# Patient Record
Sex: FEMALE | Race: WHITE | NOT HISPANIC OR LATINO | Employment: OTHER | URBAN - METROPOLITAN AREA
[De-identification: names, ages, dates, MRNs, and addresses within clinical notes are randomized per-mention and may not be internally consistent; named-entity substitution may affect disease eponyms.]

---

## 2018-12-03 ENCOUNTER — OFFICE VISIT (OUTPATIENT)
Dept: FAMILY MEDICINE CLINIC | Facility: CLINIC | Age: 73
End: 2018-12-03
Payer: MEDICARE

## 2018-12-03 ENCOUNTER — APPOINTMENT (OUTPATIENT)
Dept: LAB | Facility: HOSPITAL | Age: 73
End: 2018-12-03
Payer: MEDICARE

## 2018-12-03 VITALS
BODY MASS INDEX: 35.57 KG/M2 | OXYGEN SATURATION: 93 % | WEIGHT: 181.2 LBS | HEART RATE: 63 BPM | HEIGHT: 60 IN | TEMPERATURE: 97.9 F | DIASTOLIC BLOOD PRESSURE: 82 MMHG | RESPIRATION RATE: 16 BRPM | SYSTOLIC BLOOD PRESSURE: 150 MMHG

## 2018-12-03 DIAGNOSIS — E78.5 DYSLIPIDEMIA: ICD-10-CM

## 2018-12-03 DIAGNOSIS — I10 ESSENTIAL HYPERTENSION: Primary | ICD-10-CM

## 2018-12-03 DIAGNOSIS — G47.00 INSOMNIA, UNSPECIFIED TYPE: ICD-10-CM

## 2018-12-03 DIAGNOSIS — I10 ESSENTIAL HYPERTENSION: ICD-10-CM

## 2018-12-03 DIAGNOSIS — J44.9 CHRONIC OBSTRUCTIVE PULMONARY DISEASE, UNSPECIFIED COPD TYPE (HCC): ICD-10-CM

## 2018-12-03 DIAGNOSIS — Z86.73 HISTORY OF CVA (CEREBROVASCULAR ACCIDENT): ICD-10-CM

## 2018-12-03 DIAGNOSIS — Z11.59 ENCOUNTER FOR HEPATITIS C SCREENING TEST FOR LOW RISK PATIENT: ICD-10-CM

## 2018-12-03 LAB
ALBUMIN SERPL BCP-MCNC: 3.6 G/DL (ref 3.5–5)
ALP SERPL-CCNC: 88 U/L (ref 46–116)
ALT SERPL W P-5'-P-CCNC: 25 U/L (ref 12–78)
ANION GAP SERPL CALCULATED.3IONS-SCNC: 3 MMOL/L (ref 4–13)
AST SERPL W P-5'-P-CCNC: 27 U/L (ref 5–45)
BASOPHILS # BLD AUTO: 0.03 THOUSANDS/ΜL (ref 0–0.1)
BASOPHILS NFR BLD AUTO: 0 % (ref 0–1)
BILIRUB SERPL-MCNC: 0.45 MG/DL (ref 0.2–1)
BUN SERPL-MCNC: 12 MG/DL (ref 5–25)
CALCIUM SERPL-MCNC: 9.7 MG/DL (ref 8.3–10.1)
CHLORIDE SERPL-SCNC: 105 MMOL/L (ref 100–108)
CHOLEST SERPL-MCNC: 147 MG/DL (ref 50–200)
CO2 SERPL-SCNC: 27 MMOL/L (ref 21–32)
CREAT SERPL-MCNC: 0.8 MG/DL (ref 0.6–1.3)
EOSINOPHIL # BLD AUTO: 0.11 THOUSAND/ΜL (ref 0–0.61)
EOSINOPHIL NFR BLD AUTO: 2 % (ref 0–6)
ERYTHROCYTE [DISTWIDTH] IN BLOOD BY AUTOMATED COUNT: 14.6 % (ref 11.6–15.1)
GFR SERPL CREATININE-BSD FRML MDRD: 73 ML/MIN/1.73SQ M
GLUCOSE P FAST SERPL-MCNC: 92 MG/DL (ref 65–99)
HCT VFR BLD AUTO: 46.1 % (ref 34.8–46.1)
HCV AB SER QL: NORMAL
HDLC SERPL-MCNC: 54 MG/DL (ref 40–60)
HGB BLD-MCNC: 14.7 G/DL (ref 11.5–15.4)
IMM GRANULOCYTES # BLD AUTO: 0.01 THOUSAND/UL (ref 0–0.2)
IMM GRANULOCYTES NFR BLD AUTO: 0 % (ref 0–2)
LDLC SERPL CALC-MCNC: 63 MG/DL (ref 0–100)
LYMPHOCYTES # BLD AUTO: 1.75 THOUSANDS/ΜL (ref 0.6–4.47)
LYMPHOCYTES NFR BLD AUTO: 24 % (ref 14–44)
MCH RBC QN AUTO: 29.8 PG (ref 26.8–34.3)
MCHC RBC AUTO-ENTMCNC: 31.9 G/DL (ref 31.4–37.4)
MCV RBC AUTO: 93 FL (ref 82–98)
MONOCYTES # BLD AUTO: 0.75 THOUSAND/ΜL (ref 0.17–1.22)
MONOCYTES NFR BLD AUTO: 10 % (ref 4–12)
NEUTROPHILS # BLD AUTO: 4.75 THOUSANDS/ΜL (ref 1.85–7.62)
NEUTS SEG NFR BLD AUTO: 64 % (ref 43–75)
NONHDLC SERPL-MCNC: 93 MG/DL
NRBC BLD AUTO-RTO: 0 /100 WBCS
PLATELET # BLD AUTO: 277 THOUSANDS/UL (ref 149–390)
PMV BLD AUTO: 10.6 FL (ref 8.9–12.7)
POTASSIUM SERPL-SCNC: 4.4 MMOL/L (ref 3.5–5.3)
PROT SERPL-MCNC: 7.6 G/DL (ref 6.4–8.2)
RBC # BLD AUTO: 4.94 MILLION/UL (ref 3.81–5.12)
SODIUM SERPL-SCNC: 135 MMOL/L (ref 136–145)
TRIGL SERPL-MCNC: 150 MG/DL
TSH SERPL DL<=0.05 MIU/L-ACNC: 0.92 UIU/ML (ref 0.36–3.74)
WBC # BLD AUTO: 7.4 THOUSAND/UL (ref 4.31–10.16)

## 2018-12-03 PROCEDURE — 85025 COMPLETE CBC W/AUTO DIFF WBC: CPT

## 2018-12-03 PROCEDURE — 80061 LIPID PANEL: CPT

## 2018-12-03 PROCEDURE — 36415 COLL VENOUS BLD VENIPUNCTURE: CPT

## 2018-12-03 PROCEDURE — 99204 OFFICE O/P NEW MOD 45 MIN: CPT | Performed by: NURSE PRACTITIONER

## 2018-12-03 PROCEDURE — 80053 COMPREHEN METABOLIC PANEL: CPT

## 2018-12-03 PROCEDURE — 84443 ASSAY THYROID STIM HORMONE: CPT

## 2018-12-03 PROCEDURE — 86803 HEPATITIS C AB TEST: CPT

## 2018-12-03 RX ORDER — SIMVASTATIN 10 MG
10 TABLET ORAL
COMMUNITY
End: 2018-12-03 | Stop reason: SDUPTHER

## 2018-12-03 RX ORDER — ALPRAZOLAM 0.5 MG/1
TABLET ORAL
COMMUNITY
End: 2018-12-03

## 2018-12-03 RX ORDER — ALBUTEROL SULFATE 90 UG/1
2 AEROSOL, METERED RESPIRATORY (INHALATION) EVERY 6 HOURS PRN
COMMUNITY
End: 2020-02-14 | Stop reason: SDUPTHER

## 2018-12-03 RX ORDER — BUDESONIDE AND FORMOTEROL FUMARATE DIHYDRATE 160; 4.5 UG/1; UG/1
2 AEROSOL RESPIRATORY (INHALATION) 2 TIMES DAILY
COMMUNITY
End: 2019-10-23 | Stop reason: SDUPTHER

## 2018-12-03 RX ORDER — CHOLECALCIFEROL (VITAMIN D3) 125 MCG
1 CAPSULE ORAL
Qty: 30 TABLET | Refills: 0 | Status: SHIPPED | OUTPATIENT
Start: 2018-12-03 | End: 2018-12-14 | Stop reason: SDUPTHER

## 2018-12-03 RX ORDER — HYDROCHLOROTHIAZIDE 25 MG/1
25 TABLET ORAL DAILY
COMMUNITY
End: 2018-12-03

## 2018-12-03 RX ORDER — SIMVASTATIN 10 MG
10 TABLET ORAL
Qty: 30 TABLET | Refills: 0 | Status: SHIPPED | OUTPATIENT
Start: 2018-12-03 | End: 2018-12-14 | Stop reason: SDUPTHER

## 2018-12-03 RX ORDER — LISINOPRIL 10 MG/1
10 TABLET ORAL DAILY
Qty: 30 TABLET | Refills: 0 | Status: SHIPPED | OUTPATIENT
Start: 2018-12-03 | End: 2018-12-14

## 2018-12-03 RX ORDER — ASPIRIN 81 MG/1
81 TABLET ORAL DAILY
COMMUNITY
End: 2018-12-14 | Stop reason: SDUPTHER

## 2018-12-03 NOTE — PROGRESS NOTES
Assessment/Plan:  1  History of CVA (cerebrovascular accident)  2011 in [facility]  - CBC and differential; Future  - Comprehensive metabolic panel; Future  - Lipid panel; Future  - TSH, 3rd generation with Free T4 reflex; Future  - lisinopril (ZESTRIL) 10 mg tablet; Take 1 tablet (10 mg total) by mouth daily  Dispense: 30 tablet; Refill: 0  - simvastatin (ZOCOR) 10 mg tablet; Take 1 tablet (10 mg total) by mouth daily at bedtime  Dispense: 30 tablet; Refill: 0    2  Dyslipidemia    - CBC and differential; Future  - Comprehensive metabolic panel; Future  - Lipid panel; Future  - TSH, 3rd generation with Free T4 reflex; Future  - lisinopril (ZESTRIL) 10 mg tablet; Take 1 tablet (10 mg total) by mouth daily  Dispense: 30 tablet; Refill: 0  - simvastatin (ZOCOR) 10 mg tablet; Take 1 tablet (10 mg total) by mouth daily at bedtime  Dispense: 30 tablet; Refill: 0    3  Essential hypertension  Stop HCTZ add ace as pt has stroke history  - CBC and differential; Future  - Comprehensive metabolic panel; Future  - Lipid panel; Future  - TSH, 3rd generation with Free T4 reflex; Future  - lisinopril (ZESTRIL) 10 mg tablet; Take 1 tablet (10 mg total) by mouth daily  Dispense: 30 tablet; Refill: 0  - simvastatin (ZOCOR) 10 mg tablet; Take 1 tablet (10 mg total) by mouth daily at bedtime  Dispense: 30 tablet; Refill: 0    4  Chronic obstructive pulmonary disease, unspecified COPD type ([facility] [address] )  Will need spirometry in future  - CBC and differential; Future  - Comprehensive metabolic panel; Future  - Lipid panel; Future  - TSH, 3rd generation with Free T4 reflex; Future    5  Insomnia, unspecified type  Stop xanax - pt has been out of pills for a while - use as needed melatonin - if still w/ issues - try low dose trazodone   - CBC and differential; Future  - Comprehensive metabolic panel; Future  - Lipid panel; Future  - TSH, 3rd generation with Free T4 reflex;  Future  - Melatonin 5 MG TABS; Take 1 tablet (5 mg total) by mouth daily at bedtime as needed (sleep issue)  Dispense: 30 tablet; Refill: 0    6  Encounter for hepatitis C screening test for low risk patient    - Hepatitis C antibody; Future           RTO in 2-3 weeks - review labs/check BP/ check on sleep - she plans to return to [facility] for holidays    Subjective:   Chief Complaint   Patient presents with   [facility]     medication refill    Patient is here to establish care   Patient says she has been feeling depressed   Patient does not have a [facility] provider   Patient given the phq9   Patient had her flu vaccine about 2 months ago        Patient ID: Kyle Salguero is a 68 y o  female  HPI  20-year-old female here to become established-Last medical provider was in [facility] it is said that she goes back and forth to [facility]  She has been here in the [facility] for 6 months  Past medical history is significant for CVA 2011 with left sided weakness however with full recovery-she states that she was put on cholesterol blood pressure and aspirin at that time  She stop taking hydrochlorothiazide a couple weeks ago due to low blood pressure in the 90s which was checked incidentally when she complained of dizziness at another provider of her sisters  BP today is 150/82 - no treatment as hydrochlorothiazide is off  Patient confirms she is only taking simvastatin 10 mg daily med however is all out of this med  Patient also was diagnosed with COPD status post CVA she does have a smoking history up until 2011 quit upon being diagnosed with CVA  Half a pack per day since her 25s  She has mild dyspnea on exertion and she is on Symbicort BID- no excessive or recent new use rescue inhaler  Patient also takes as needed Xanax for sleep issues - [facility] provider - has not used as she has ran out - sleeping fairly well- at times wakes up middle of night - unable to fall back to sleep  No recent labs in [facility] - had flu shot in SEPT 2018, pneumo 3 years and shingles shot in 2011  The following portions of the patient's history were reviewed and updated as appropriate: allergies, past social history, past surgical history and problem list     Review of Systems   Constitutional: Negative for activity change and appetite change  HENT: Negative  Eyes: Negative  Respiratory: Negative  Negative for cough, shortness of breath and wheezing  Mild MEADE   Cardiovascular: Negative  Negative for chest pain  Gastrointestinal: Negative  Endocrine: Negative  Negative for cold intolerance  Genitourinary: Negative  Musculoskeletal: Negative  Skin: Negative  Allergic/Immunologic: Negative  Neurological: Negative  Hematological: Negative  Psychiatric/Behavioral: Negative  All other systems reviewed and are negative  Objective:      /82 (BP Location: Left arm, Patient Position: Sitting, Cuff Size: Large)   Pulse 63   Temp 97 9 °F (36 6 °C) (Oral)   Resp 16   Ht 4' 11 84" (1 52 m)   Wt 82 2 kg (181 lb 3 2 oz)   SpO2 93%   BMI 35 57 kg/m²          Physical Exam   Constitutional: She is oriented to person, place, and time  She appears well-developed and well-nourished  No distress  HENT:   Head: Normocephalic  Right Ear: Tympanic membrane and external ear normal  No decreased hearing is noted  Left Ear: Tympanic membrane and external ear normal  No decreased hearing is noted  Mouth/Throat: Oropharynx is clear and moist    Eyes: Pupils are equal, round, and reactive to light  Conjunctivae are normal    Neck: Normal range of motion  Neck supple  No thyromegaly present  Cardiovascular: Normal rate, regular rhythm, normal heart sounds and intact distal pulses  No murmur heard  Pulmonary/Chest: Effort normal and breath sounds normal  No respiratory distress  She has no wheezes  She has no rales  Abdominal: Soft  Bowel sounds are normal  She exhibits no distension and no mass  There is no tenderness   There is no rebound and no guarding  Musculoskeletal: Normal range of motion  She exhibits no edema  Lymphadenopathy:     She has no cervical adenopathy  Neurological: She is alert and oriented to person, place, and time  She has normal strength and normal reflexes  No cranial nerve deficit  Coordination normal  GCS eye subscore is 4  GCS verbal subscore is 5  GCS motor subscore is 6  Reflex Scores:       Patellar reflexes are 2+ on the right side and 2+ on the left side  Achilles reflexes are 2+ on the right side and 2+ on the left side  Skin: Skin is warm and dry  Psychiatric: She has a normal mood and affect   Her behavior is normal  Judgment and thought content normal

## 2018-12-14 ENCOUNTER — OFFICE VISIT (OUTPATIENT)
Dept: FAMILY MEDICINE CLINIC | Facility: CLINIC | Age: 73
End: 2018-12-14
Payer: MEDICARE

## 2018-12-14 VITALS
OXYGEN SATURATION: 97 % | RESPIRATION RATE: 16 BRPM | HEART RATE: 86 BPM | DIASTOLIC BLOOD PRESSURE: 80 MMHG | WEIGHT: 181 LBS | SYSTOLIC BLOOD PRESSURE: 150 MMHG | TEMPERATURE: 97.6 F | HEIGHT: 59 IN | BODY MASS INDEX: 36.49 KG/M2

## 2018-12-14 DIAGNOSIS — I10 ESSENTIAL HYPERTENSION: Primary | ICD-10-CM

## 2018-12-14 DIAGNOSIS — E78.5 DYSLIPIDEMIA: ICD-10-CM

## 2018-12-14 DIAGNOSIS — Z86.73 HISTORY OF CVA (CEREBROVASCULAR ACCIDENT): ICD-10-CM

## 2018-12-14 DIAGNOSIS — G47.00 INSOMNIA, UNSPECIFIED TYPE: ICD-10-CM

## 2018-12-14 PROCEDURE — 99214 OFFICE O/P EST MOD 30 MIN: CPT | Performed by: NURSE PRACTITIONER

## 2018-12-14 RX ORDER — AMLODIPINE BESYLATE 5 MG/1
5 TABLET ORAL DAILY
Qty: 90 TABLET | Refills: 0 | Status: SHIPPED | OUTPATIENT
Start: 2018-12-14 | End: 2019-03-11 | Stop reason: SDUPTHER

## 2018-12-14 RX ORDER — ASPIRIN 81 MG/1
81 TABLET ORAL DAILY
Qty: 90 TABLET | Refills: 0 | Status: SHIPPED | OUTPATIENT
Start: 2018-12-14 | End: 2020-12-01 | Stop reason: SDUPTHER

## 2018-12-14 RX ORDER — SIMVASTATIN 10 MG
10 TABLET ORAL
Qty: 90 TABLET | Refills: 0 | Status: SHIPPED | OUTPATIENT
Start: 2018-12-14 | End: 2019-03-11 | Stop reason: SDUPTHER

## 2018-12-14 RX ORDER — CHOLECALCIFEROL (VITAMIN D3) 125 MCG
1 CAPSULE ORAL
Qty: 90 TABLET | Refills: 0 | Status: SHIPPED | OUTPATIENT
Start: 2018-12-14

## 2018-12-14 NOTE — PROGRESS NOTES
Assessment/Plan:  1  Essential hypertension  Cough on ace - chg to [facility] (safer then [facility] as pt is traveling soon)  - amLODIPine (NORVASC) 5 mg tablet; Take 1 tablet (5 mg total) by mouth daily  Dispense: 90 tablet; Refill: 0  - simvastatin (ZOCOR) 10 mg tablet; Take 1 tablet (10 mg total) by mouth daily at bedtime  Dispense: 90 tablet; Refill: 0  - aspirin (ECOTRIN LOW STRENGTH) 81 mg EC tablet; Take 1 tablet (81 mg total) by mouth daily  Dispense: 90 tablet; Refill: 0  - Comprehensive metabolic panel; Future  - Lipid panel; Future    2  Dyslipidemia  stable  - simvastatin (ZOCOR) 10 mg tablet; Take 1 tablet (10 mg total) by mouth daily at bedtime  Dispense: 90 tablet; Refill: 0  - aspirin (ECOTRIN LOW STRENGTH) 81 mg EC tablet; Take 1 tablet (81 mg total) by mouth daily  Dispense: 90 tablet; Refill: 0  - Comprehensive metabolic panel; Future  - Lipid panel; Future    3  History of CVA (cerebrovascular accident)  Cont asa, statin and BP control   - simvastatin (ZOCOR) 10 mg tablet; Take 1 tablet (10 mg total) by mouth daily at bedtime  Dispense: 90 tablet; Refill: 0  - aspirin (ECOTRIN LOW STRENGTH) 81 mg EC tablet; Take 1 tablet (81 mg total) by mouth daily  Dispense: 90 tablet; Refill: 0  - Comprehensive metabolic panel; Future  - Lipid panel; Future    4  Insomnia, unspecified type  Stable on current regimen  - Melatonin 5 MG TABS; Take 1 tablet (5 mg total) by mouth daily at bedtime as needed (sleep issue)  Dispense: 90 tablet; Refill: 0  - Comprehensive metabolic panel; Future  - Lipid panel; Future    RTO in on week for bp check on CCB - goal 130/80  RTO in 6 mos after labs - needs full AWV  Subjective:    Chief Complaint   Patient presents with    Follow-up     2 week    Labs done 12/3/18  Patient given the AWV paper          Patient ID: Robert Rousseau is a 68 y o  female  HPI  Follow-up lab review and blood pressure check/ response to melatonin    Patient was taking off HCTZ and Ace was added - currently on lisinopril 10 mg daily  BP noted 150/80  Patient admits to dry cough since starting lisinopril  No shortness of breath or chest pain  No dizziness  Lab review stable  Kidney function normal/fasting blood sugar normal/lipid profile well controlled - on statin  Pt states sleeping on melatonin nightly and is happy with these results  She plans on a trip to [place] for holidays  The following portions of the patient's history were reviewed and updated as appropriate: allergies, past social history, past surgical history and problem list     Review of Systems   Constitutional: Negative  Respiratory: Positive for cough  Negative for chest tightness, shortness of breath and wheezing  Cardiovascular: Negative  Genitourinary: Negative  Neurological: Negative for dizziness and weakness  Psychiatric/Behavioral: Negative for sleep disturbance  Objective:      /80 (BP Location: Left arm, Patient Position: Sitting, Cuff Size: Large)   Pulse 86   Temp 97 6 °F (36 4 °C) (Oral)   Resp 16   Ht 4' 11" (1 499 m)   Wt 82 1 kg (181 lb)   SpO2 97%   BMI 36 56 kg/m²          Physical Exam   Constitutional: She is oriented to person, place, and time  She appears well-developed and well-nourished  No distress  HENT:   Head: Normocephalic  Eyes: Pupils are equal, round, and reactive to light  Cardiovascular: Normal rate, regular rhythm and normal heart sounds  Pulmonary/Chest: Effort normal and breath sounds normal  No respiratory distress  She has no wheezes  She has no rales  She exhibits no tenderness  Neurological: She is alert and oriented to person, place, and time  Skin: Skin is warm and dry  Psychiatric: She has a normal mood and affect   Her behavior is normal

## 2018-12-21 ENCOUNTER — CLINICAL SUPPORT (OUTPATIENT)
Dept: FAMILY MEDICINE CLINIC | Facility: CLINIC | Age: 73
End: 2018-12-21

## 2018-12-21 VITALS — HEART RATE: 73 BPM | OXYGEN SATURATION: 97 % | SYSTOLIC BLOOD PRESSURE: 140 MMHG | DIASTOLIC BLOOD PRESSURE: 76 MMHG

## 2018-12-21 DIAGNOSIS — I10 HYPERTENSION, UNSPECIFIED TYPE: Primary | ICD-10-CM

## 2018-12-21 NOTE — PROGRESS NOTES
Pt here for b/p check, start of new medication      Pt verified taking new medication daily in the morning    After [address] reviewed vitals, pt was instructed to continue current regiment

## 2018-12-26 ENCOUNTER — TELEPHONE (OUTPATIENT)
Dept: FAMILY MEDICINE CLINIC | Facility: CLINIC | Age: 73
End: 2018-12-26

## 2018-12-26 NOTE — TELEPHONE ENCOUNTER
Pharmacist Glenetta Fleischer is requesting a refill on her Lisinopril 10mg which I don't see on her med list

## 2019-01-28 ENCOUNTER — TELEPHONE (OUTPATIENT)
Dept: FAMILY MEDICINE CLINIC | Facility: CLINIC | Age: 74
End: 2019-01-28

## 2019-01-28 NOTE — TELEPHONE ENCOUNTER
[facility] pharmacy called asking for a refill of pt's lisinipril, 10 mg, take 1 daily but I didn't see it on her med list

## 2019-03-11 DIAGNOSIS — E78.5 DYSLIPIDEMIA: ICD-10-CM

## 2019-03-11 DIAGNOSIS — I10 ESSENTIAL HYPERTENSION: ICD-10-CM

## 2019-03-11 DIAGNOSIS — Z86.73 HISTORY OF CVA (CEREBROVASCULAR ACCIDENT): ICD-10-CM

## 2019-03-11 RX ORDER — SIMVASTATIN 10 MG
10 TABLET ORAL
Qty: 90 TABLET | Refills: 0 | Status: SHIPPED | OUTPATIENT
Start: 2019-03-11 | End: 2019-03-14 | Stop reason: SDUPTHER

## 2019-03-11 RX ORDER — AMLODIPINE BESYLATE 5 MG/1
5 TABLET ORAL DAILY
Qty: 90 TABLET | Refills: 0 | Status: SHIPPED | OUTPATIENT
Start: 2019-03-11 | End: 2019-03-14 | Stop reason: SDUPTHER

## 2019-03-11 NOTE — TELEPHONE ENCOUNTER
amLODIPine (NORVASC) 5 mg tablet  simvastatin (ZOCOR) 10 mg tablet  90 day supply to [facility]

## 2019-03-14 DIAGNOSIS — E78.5 DYSLIPIDEMIA: ICD-10-CM

## 2019-03-14 DIAGNOSIS — Z86.73 HISTORY OF CVA (CEREBROVASCULAR ACCIDENT): ICD-10-CM

## 2019-03-14 DIAGNOSIS — I10 ESSENTIAL HYPERTENSION: ICD-10-CM

## 2019-03-14 RX ORDER — AMLODIPINE BESYLATE 5 MG/1
5 TABLET ORAL DAILY
Qty: 90 TABLET | Refills: 0 | Status: SHIPPED | OUTPATIENT
Start: 2019-03-14 | End: 2019-06-11 | Stop reason: SDUPTHER

## 2019-03-14 RX ORDER — SIMVASTATIN 10 MG
10 TABLET ORAL
Qty: 90 TABLET | Refills: 0 | Status: SHIPPED | OUTPATIENT
Start: 2019-03-14 | End: 2019-06-11 | Stop reason: SDUPTHER

## 2019-03-14 NOTE — TELEPHONE ENCOUNTER
amLODIPine (NORVASC) 5 mg tablet [[id number]]     Order Details   Dose: 5 mg Route: Oral Frequency: Daily   Dispense Quantity: 90 tablet Refills: 0 Fills remaining: --           Sig: Take 1 tablet (5 mg total) by mouth daily        Pharmacy:  [facility]/pharmacy #[id number] JAG #:  [id number]

## 2019-03-14 NOTE — TELEPHONE ENCOUNTER
simvastatin (ZOCOR) 10 mg tablet [[id number]]     Order Details   Dose: 10 mg Route: Oral Frequency: Daily at bedtime   Dispense Quantity: 90 tablet Refills: 0 Fills remaining: --           Sig: Take 1 tablet (10 mg total) by mouth daily at bedtime        And amlodipine

## 2019-06-11 DIAGNOSIS — E78.5 DYSLIPIDEMIA: ICD-10-CM

## 2019-06-11 DIAGNOSIS — I10 ESSENTIAL HYPERTENSION: ICD-10-CM

## 2019-06-11 DIAGNOSIS — Z86.73 HISTORY OF CVA (CEREBROVASCULAR ACCIDENT): ICD-10-CM

## 2019-06-11 RX ORDER — SIMVASTATIN 10 MG
10 TABLET ORAL
Qty: 90 TABLET | Refills: 1 | Status: SHIPPED | OUTPATIENT
Start: 2019-06-11 | End: 2019-10-23 | Stop reason: SDUPTHER

## 2019-06-11 RX ORDER — AMLODIPINE BESYLATE 5 MG/1
5 TABLET ORAL DAILY
Qty: 90 TABLET | Refills: 1 | Status: SHIPPED | OUTPATIENT
Start: 2019-06-11 | End: 2019-10-23

## 2019-06-14 DIAGNOSIS — E78.5 DYSLIPIDEMIA: ICD-10-CM

## 2019-06-14 DIAGNOSIS — Z86.73 HISTORY OF CVA (CEREBROVASCULAR ACCIDENT): ICD-10-CM

## 2019-06-14 DIAGNOSIS — I10 ESSENTIAL HYPERTENSION: ICD-10-CM

## 2019-06-17 RX ORDER — AMLODIPINE BESYLATE 5 MG/1
5 TABLET ORAL DAILY
Qty: 90 TABLET | Refills: 1 | OUTPATIENT
Start: 2019-06-17

## 2019-06-17 RX ORDER — SIMVASTATIN 10 MG
10 TABLET ORAL
Qty: 90 TABLET | Refills: 1 | OUTPATIENT
Start: 2019-06-17

## 2019-10-19 ENCOUNTER — APPOINTMENT (OUTPATIENT)
Dept: LAB | Facility: HOSPITAL | Age: 74
End: 2019-10-19
Payer: COMMERCIAL

## 2019-10-19 DIAGNOSIS — I10 ESSENTIAL HYPERTENSION: ICD-10-CM

## 2019-10-19 DIAGNOSIS — Z86.73 HISTORY OF CVA (CEREBROVASCULAR ACCIDENT): ICD-10-CM

## 2019-10-19 DIAGNOSIS — G47.00 INSOMNIA, UNSPECIFIED TYPE: ICD-10-CM

## 2019-10-19 DIAGNOSIS — E78.5 DYSLIPIDEMIA: ICD-10-CM

## 2019-10-19 LAB
ALBUMIN SERPL BCP-MCNC: 3.4 G/DL (ref 3.5–5)
ALP SERPL-CCNC: 91 U/L (ref 46–116)
ALT SERPL W P-5'-P-CCNC: 22 U/L (ref 12–78)
ANION GAP SERPL CALCULATED.3IONS-SCNC: 5 MMOL/L (ref 4–13)
AST SERPL W P-5'-P-CCNC: 21 U/L (ref 5–45)
BILIRUB SERPL-MCNC: 0.43 MG/DL (ref 0.2–1)
BUN SERPL-MCNC: 20 MG/DL (ref 5–25)
CALCIUM SERPL-MCNC: 9.1 MG/DL (ref 8.3–10.1)
CHLORIDE SERPL-SCNC: 106 MMOL/L (ref 100–108)
CHOLEST SERPL-MCNC: 144 MG/DL (ref 50–200)
CO2 SERPL-SCNC: 29 MMOL/L (ref 21–32)
CREAT SERPL-MCNC: 0.75 MG/DL (ref 0.6–1.3)
GFR SERPL CREATININE-BSD FRML MDRD: 79 ML/MIN/1.73SQ M
GLUCOSE P FAST SERPL-MCNC: 98 MG/DL (ref 65–99)
HDLC SERPL-MCNC: 60 MG/DL (ref 40–60)
LDLC SERPL CALC-MCNC: 66 MG/DL (ref 0–100)
NONHDLC SERPL-MCNC: 84 MG/DL
POTASSIUM SERPL-SCNC: 4.1 MMOL/L (ref 3.5–5.3)
PROT SERPL-MCNC: 7.2 G/DL (ref 6.4–8.2)
SODIUM SERPL-SCNC: 140 MMOL/L (ref 136–145)
TRIGL SERPL-MCNC: 88 MG/DL

## 2019-10-19 PROCEDURE — 80053 COMPREHEN METABOLIC PANEL: CPT

## 2019-10-19 PROCEDURE — 36415 COLL VENOUS BLD VENIPUNCTURE: CPT

## 2019-10-19 PROCEDURE — 80061 LIPID PANEL: CPT

## 2019-10-23 ENCOUNTER — OFFICE VISIT (OUTPATIENT)
Dept: FAMILY MEDICINE CLINIC | Facility: CLINIC | Age: 74
End: 2019-10-23
Payer: COMMERCIAL

## 2019-10-23 VITALS
WEIGHT: 183 LBS | TEMPERATURE: 98.3 F | HEART RATE: 107 BPM | HEIGHT: 59 IN | OXYGEN SATURATION: 96 % | SYSTOLIC BLOOD PRESSURE: 122 MMHG | RESPIRATION RATE: 16 BRPM | BODY MASS INDEX: 36.89 KG/M2 | DIASTOLIC BLOOD PRESSURE: 80 MMHG

## 2019-10-23 DIAGNOSIS — Z86.73 HISTORY OF CVA (CEREBROVASCULAR ACCIDENT): ICD-10-CM

## 2019-10-23 DIAGNOSIS — E78.5 DYSLIPIDEMIA: ICD-10-CM

## 2019-10-23 DIAGNOSIS — Z12.11 SCREENING FOR COLON CANCER: ICD-10-CM

## 2019-10-23 DIAGNOSIS — I10 ESSENTIAL HYPERTENSION: Primary | ICD-10-CM

## 2019-10-23 DIAGNOSIS — J44.9 CHRONIC OBSTRUCTIVE PULMONARY DISEASE, UNSPECIFIED COPD TYPE (HCC): ICD-10-CM

## 2019-10-23 DIAGNOSIS — Z23 NEED FOR PNEUMOCOCCAL VACCINATION: ICD-10-CM

## 2019-10-23 DIAGNOSIS — Z12.39 SCREENING FOR BREAST CANCER: ICD-10-CM

## 2019-10-23 PROCEDURE — 1170F FXNL STATUS ASSESSED: CPT | Performed by: NURSE PRACTITIONER

## 2019-10-23 PROCEDURE — 99214 OFFICE O/P EST MOD 30 MIN: CPT | Performed by: NURSE PRACTITIONER

## 2019-10-23 PROCEDURE — 1125F AMNT PAIN NOTED PAIN PRSNT: CPT | Performed by: NURSE PRACTITIONER

## 2019-10-23 PROCEDURE — G0009 ADMIN PNEUMOCOCCAL VACCINE: HCPCS | Performed by: NURSE PRACTITIONER

## 2019-10-23 PROCEDURE — 90732 PPSV23 VACC 2 YRS+ SUBQ/IM: CPT | Performed by: NURSE PRACTITIONER

## 2019-10-23 PROCEDURE — G0438 PPPS, INITIAL VISIT: HCPCS | Performed by: NURSE PRACTITIONER

## 2019-10-23 RX ORDER — SIMVASTATIN 10 MG
10 TABLET ORAL
Qty: 90 TABLET | Refills: 1 | Status: SHIPPED | OUTPATIENT
Start: 2019-10-23 | End: 2020-03-13 | Stop reason: SDUPTHER

## 2019-10-23 RX ORDER — BUDESONIDE AND FORMOTEROL FUMARATE DIHYDRATE 160; 4.5 UG/1; UG/1
2 AEROSOL RESPIRATORY (INHALATION) 2 TIMES DAILY
Qty: 1 INHALER | Refills: 5 | Status: SHIPPED | OUTPATIENT
Start: 2019-10-23 | End: 2020-04-23 | Stop reason: SDUPTHER

## 2019-10-23 RX ORDER — ALBUTEROL SULFATE 90 UG/1
2 AEROSOL, METERED RESPIRATORY (INHALATION) EVERY 6 HOURS PRN
Qty: 1 INHALER | Refills: 0 | Status: SHIPPED | OUTPATIENT
Start: 2019-10-23 | End: 2020-12-01 | Stop reason: SDUPTHER

## 2019-10-23 NOTE — PATIENT INSTRUCTIONS
Medicare Preventive Visit Patient Instructions  Thank you for completing your Welcome to Medicare Visit or Medicare Annual Wellness Visit today  Your next wellness visit will be due in one year (10/23/2020)  The screening/preventive services that you may require over the next 5-10 years are detailed below  Some tests may not apply to you based off risk factors and/or age  Screening tests ordered at today's visit but not completed yet may show as past due  Also, please note that scanned in results may not display below  Preventive Screenings:  Service Recommendations Previous Testing/Comments   Colorectal Cancer Screening  * Colonoscopy    * Fecal Occult Blood Test (FOBT)/Fecal Immunochemical Test (FIT)  * Fecal DNA/Cologuard Test  * Flexible Sigmoidoscopy Age: 54-65 years old   Colonoscopy: every 10 years (may be performed more frequently if at higher risk)  OR  FOBT/FIT: every 1 year  OR  Cologuard: every 3 years  OR  Sigmoidoscopy: every 5 years  Screening may be recommended earlier than age 48 if at higher risk for colorectal cancer  Also, an individualized decision between you and your healthcare provider will decide whether screening between the ages of 74-80 would be appropriate  Colonoscopy: Not on file  FOBT/FIT: Not on file  Cologuard: Not on file  Sigmoidoscopy: Not on file         Breast Cancer Screening Age: 36 years old  Frequency: every 1-2 years  Not required if history of left and right mastectomy Mammogram: Not on file       Cervical Cancer Screening Between the ages of 21-29, pap smear recommended once every 3 years  Between the ages of 33-67, can perform pap smear with HPV co-testing every 5 years     Recommendations may differ for women with a history of total hysterectomy, cervical cancer, or abnormal pap smears in past  Pap Smear: Not on file    Screening Not Indicated   Hepatitis C Screening Once for adults born between [facility]  More frequently in patients at high risk for Hepatitis C Hep C Antibody: 12/03/2018    Screening Current   Diabetes Screening 1-2 times per year if you're at risk for diabetes or have pre-diabetes Fasting glucose: 98 mg/dL   A1C: No results in last 5 years    Screening Current   Cholesterol Screening Once every 5 years if you don't have a lipid disorder  May order more often based on risk factors  Lipid panel: 10/19/2019    Screening Current     Other Preventive Screenings Covered by Medicare:  1  Abdominal Aortic Aneurysm (AAA) Screening: covered once if your at risk  You're considered to be at risk if you have a family history of AAA  2  Lung Cancer Screening: covers low dose CT scan once per year if you meet all of the following conditions: (1) Age 50-69; (2) No signs or symptoms of lung cancer; (3) Current smoker or have quit smoking within the last 15 years; (4) You have a tobacco smoking history of at least 30 pack years (packs per day multiplied by number of years you smoked); (5) You get a written order from a healthcare provider  3  Glaucoma Screening: covered annually if you're considered high risk: (1) You have diabetes OR (2) Family history of glaucoma OR (3)  aged 48 and older OR (3)  American aged 72 and older  3  Osteoporosis Screening: covered every 2 years if you meet one of the following conditions: (1) You're estrogen deficient and at risk for osteoporosis based off medical history and other findings; (2) Have a vertebral abnormality; (3) On glucocorticoid therapy for more than 3 months; (4) Have primary hyperparathyroidism; (5) On osteoporosis medications and need to assess response to drug therapy  · Last bone density test (DXA Scan): Not on file  5  HIV Screening: covered annually if you're between the age of 12-76  Also covered annually if you are younger than 13 and older than 72 with risk factors for HIV infection   For pregnant patients, it is covered up to 3 times per pregnancy  Immunizations:  Immunization Recommendations   Influenza Vaccine Annual influenza vaccination during flu season is recommended for all persons aged >= 6 months who do not have contraindications   Pneumococcal Vaccine (Prevnar and Pneumovax)  * Prevnar = PCV13  * Pneumovax = PPSV23   Adults 25-60 years old: 1-3 doses may be recommended based on certain risk factors  Adults 72 years old: Prevnar (PCV13) vaccine recommended followed by Pneumovax (PPSV23) vaccine  If already received PPSV23 since turning 65, then PCV13 recommended at least one year after PPSV23 dose  Hepatitis B Vaccine 3 dose series if at intermediate or high risk (ex: diabetes, end stage renal disease, liver disease)   Tetanus (Td) Vaccine - COST NOT COVERED BY MEDICARE PART B Following completion of primary series, a booster dose should be given every 10 years to maintain immunity against tetanus  Td may also be given as tetanus wound prophylaxis  Tdap Vaccine - COST NOT COVERED BY MEDICARE PART B Recommended at least once for all adults  For pregnant patients, recommended with each pregnancy  Shingles Vaccine (Shingrix) - COST NOT COVERED BY MEDICARE PART B  2 shot series recommended in those aged 48 and above     Health Maintenance Due:      Topic Date Due    MAMMOGRAM  1945    CRC Screening: Colonoscopy  1945    Hepatitis C Screening  Completed     Immunizations Due:      Topic Date Due    DTaP,Tdap,and Td Vaccines (1 - Tdap) 07/10/1966    Pneumococcal Vaccine: 65+ Years (1 of 2 - PCV13) 07/10/2010    INFLUENZA VACCINE  07/01/2019     Advance Directives   What are advance directives? Advance directives are legal documents that state your wishes and plans for medical care  These plans are made ahead of time in case you lose your ability to make decisions for yourself  Advance directives can apply to any medical decision, such as the treatments you want, and if you want to donate organs     What are the types of advance directives? There are many types of advance directives, and each state has rules about how to use them  You may choose a combination of any of the following:  · Living will: This is a written record of the treatment you want  You can also choose which treatments you do not want, which to limit, and which to stop at a certain time  This includes surgery, medicine, IV fluid, and tube feedings  · Durable power of  for healthcare [facility]): This is a written record that states who you want to make healthcare choices for you when you are unable to make them for yourself  This person, called a proxy, is usually a family member or a friend  You may choose more than 1 proxy  · Do not resuscitate (DNR) order:  A DNR order is used in case your heart stops beating or you stop breathing  It is a request not to have certain forms of treatment, such as CPR  A DNR order may be included in other types of advance directives  · Medical directive: This covers the care that you want if you are in a coma, near death, or unable to make decisions for yourself  You can list the treatments you want for each condition  Treatment may include pain medicine, surgery, blood transfusions, dialysis, IV or tube feedings, and a ventilator (breathing machine)  · Values history: This document has questions about your views, beliefs, and how you feel and think about life  This information can help others choose the care that you would choose  Why are advance directives important? An advance directive helps you control your care  Although spoken wishes may be used, it is better to have your wishes written down  Spoken wishes can be misunderstood, or not followed  Treatments may be given even if you do not want them  An advance directive may make it easier for your family to make difficult choices about your care     Weight Management   Why it is important to manage your weight:  Being overweight increases your risk of health conditions such as heart disease, high blood pressure, type 2 diabetes, and certain types of cancer  It can also increase your risk for osteoarthritis, sleep apnea, and other respiratory problems  Aim for a slow, steady weight loss  Even a small amount of weight loss can lower your risk of health problems  How to lose weight safely:  A safe and healthy way to lose weight is to eat fewer calories and get regular exercise  You can lose up about 1 pound a week by decreasing the number of calories you eat by 500 calories each day  Healthy meal plan for weight management:  A healthy meal plan includes a variety of foods, contains fewer calories, and helps you stay healthy  A healthy meal plan includes the following:  · Eat whole-grain foods more often  A healthy meal plan should contain fiber  Fiber is the part of grains, fruits, and vegetables that is not broken down by your body  Whole-grain foods are healthy and provide extra fiber in your diet  Some examples of whole-grain foods are whole-wheat breads and pastas, oatmeal, brown rice, and bulgur  · Eat a variety of vegetables every day  Include dark, leafy greens such as spinach, kale, isabell greens, and mustard greens  Eat yellow and orange vegetables such as carrots, sweet potatoes, and winter squash  · Eat a variety of fruits every day  Choose fresh or canned fruit (canned in its own juice or light syrup) instead of juice  Fruit juice has very little or no fiber  · Eat low-fat dairy foods  Drink fat-free (skim) milk or 1% milk  Eat fat-free yogurt and low-fat cottage cheese  Try low-fat cheeses such as mozzarella and other reduced-fat cheeses  · Choose meat and other protein foods that are low in fat  Choose beans or other legumes such as split peas or lentils  Choose fish, skinless poultry (chicken or turkey), or lean cuts of red meat (beef or pork)  Before you cook meat or poultry, cut off any visible fat  · Use less fat and oil    Try baking foods instead of frying them  Add less fat, such as margarine, sour cream, regular salad dressing and mayonnaise to foods  Eat fewer high-fat foods  Some examples of high-fat foods include french fries, doughnuts, ice cream, and cakes  · Eat fewer sweets  Limit foods and drinks that are high in sugar  This includes candy, cookies, regular soda, and sweetened drinks  Exercise:  Exercise at least 30 minutes per day on most days of the week  Some examples of exercise include walking, biking, dancing, and swimming  You can also fit in more physical activity by taking the stairs instead of the elevator or parking farther away from stores  Ask your healthcare provider about the best exercise plan for you  © Copyright [employer] 2018 Information is for End User's use only and may not be sold, redistributed or otherwise used for commercial purposes   All illustrations and images included in CareNotes® are the copyrighted property of A D A M , Inc  or [address] [place] [address]

## 2019-10-23 NOTE — PROGRESS NOTES
Assessment and Plan:     Problem List Items Addressed This Visit        Respiratory    COPD (chronic obstructive pulmonary disease) ([facility] 75 )    Relevant Medications    budesonide-formoterol (SYMBICORT) 160-4 5 mcg/act inhaler    albuterol (PROAIR HFA) 90 mcg/act inhaler       Cardiovascular and Mediastinum    Essential hypertension - Primary     DC amlodipine and allow BP to come up to 140-150/80 as pt experiencing orthostatic dizziness  Re chk in  2 weeks  Relevant Orders    Comprehensive metabolic panel    Lipid panel       Other    Dyslipidemia    Relevant Medications    simvastatin (ZOCOR) 10 mg tablet    Other Relevant Orders    Comprehensive metabolic panel    Lipid panel    History of CVA (cerebrovascular accident)    Relevant Medications    simvastatin (ZOCOR) 10 mg tablet      Other Visit Diagnoses     Screening for breast cancer        Screening for colon cancer        Need for pneumococcal vaccination        Relevant Orders    PNEUMOCOCCAL POLYSACCHARIDE VACCINE 23-VALENT =>3YO SQ IM (Completed)           Preventive health issues were discussed with patient, and age appropriate screening tests were ordered as noted in patient's After Visit Summary  Personalized health advice and appropriate referrals for health education or preventive services given if needed, as noted in patient's After Visit Summary       History of Present Illness:     Patient presents for Medicare Annual Wellness visit    Patient Care Team:  Albino huerta PCP - General (Family Medicine)     Problem List:     Patient Active Problem List   Diagnosis    Dyslipidemia    COPD (chronic obstructive pulmonary disease) ([facility] )    History of CVA (cerebrovascular accident)    Essential hypertension      Past Medical and Surgical History:     Past Medical History:   Diagnosis Date    [facility]     Fracture 2011    rt leg    H/O: hysterectomy 2011    Shingles     Stroke [facility]) 2011 Past Surgical History:   Procedure Laterality Date    HYSTERECTOMY        Family History:     Family History   Problem Relation Age of Onset   [facility] Hyperlipidemia Mother     Hypertension Father     Cancer Sister     Diabetes Family       Social History:     Social History     Socioeconomic History    Marital status:      Spouse name: None    Number of children: 3    Years of education: None    Highest education level: None   Occupational History    None   Social Needs    Financial resource strain: Not hard at all   [facility]:     Worry: Never true     Inability: Never true    Transportation needs:     Medical: No     Non-medical: No   Tobacco Use    Smoking status: Former Smoker     Packs/day: 0 50     Years: 40 00     Pack years: 20 00     Types: Cigarettes     Last attempt to quit:      Years since quittin 8    Smokeless tobacco: Never Used   Substance and Sexual Activity    Alcohol use: No    Drug use: No    Sexual activity: None   Lifestyle    Physical activity:     Days per week: 0 days     Minutes per session: 0 min    Stress: Not at all   Relationships    Social connections:     Talks on phone: Patient refused     Gets together: Patient refused     Attends Zoroastrian service: Patient refused     Active member of club or organization: Patient refused     Attends meetings of clubs or organizations: Patient refused     Relationship status: Patient refused    Intimate partner violence:     Fear of current or ex partner: No     Emotionally abused: No     Physically abused: No     Forced sexual activity: No   Other Topics Concern    None   Social History Narrative        No know smoke exposure     No caffeine     No alcohol    No illicit drug use     Seatbelt use     No regular exercise     Has 3 children     Sabianist            Medications and Allergies:     Current Outpatient Medications   Medication Sig Dispense Refill    albuterol (PROVENTIL HFA,VENTOLIN HFA) 90 mcg/act inhaler Inhale 2 puffs every 6 (six) hours as needed for wheezing      aspirin (ECOTRIN LOW STRENGTH) 81 mg EC tablet Take 1 tablet (81 mg total) by mouth daily 90 tablet 0    budesonide-formoterol (SYMBICORT) 160-4 5 mcg/act inhaler Inhale 2 puffs 2 (two) times a day Rinse mouth after use  1 Inhaler 5    Melatonin 5 MG TABS Take 1 tablet (5 mg total) by mouth daily at bedtime as needed (sleep issue) 90 tablet 0    simvastatin (ZOCOR) 10 mg tablet Take 1 tablet (10 mg total) by mouth daily at bedtime 90 tablet 1    albuterol (PROAIR HFA) 90 mcg/act inhaler Inhale 2 puffs every 6 (six) hours as needed for wheezing or shortness of breath 1 Inhaler 0     No current facility-administered medications for this visit  Allergies   Allergen Reactions    Lisinopril Cough      Immunizations:     Immunization History   Administered Date(s) Administered     Influenza (IM) Preservative Free 09/23/2019    Pneumococcal Polysaccharide PPV23 10/23/2019    influenza, trivalent, adjuvanted 09/04/2019      Health Maintenance:         Topic Date Due    MAMMOGRAM  10/23/2020 (Originally 1945)    CRC Screening: Colonoscopy  10/23/2020 (Originally 1945)    Hepatitis C Screening  Completed         Topic Date Due    Pneumococcal Vaccine: 65+ Years (1 of 2 - PCV13) 07/10/2010      Medicare Health Risk Assessment:     /80 (BP Location: Left arm, Patient Position: Sitting, Cuff Size: Large)   Pulse (!) 107   Temp 98 3 °F (36 8 °C) (Oral)   Resp 16   Ht 4' 11" (1 499 m)   Wt 83 kg (183 lb)   SpO2 96%   BMI 36 96 kg/m²      Emi Donnelly is here for her Subsequent Wellness visit  Health Risk Assessment:   Patient rates overall health as fair  Patient feels that their physical health rating is same  Eyesight was rated as same  Hearing was rated as same  Patient feels that their emotional and mental health rating is same  Pain experienced in the last 7 days has been none   Patient states that she has experienced no weight loss or gain in last 6 months  Depression Screening:   PHQ-2 Score: 0      Fall Risk Screening: In the past year, patient has experienced: no history of falling in past year      Home Safety:  Patient does not have trouble with stairs inside or outside of their home  Patient has working smoke alarms and has no working carbon monoxide detector  Home safety hazards include: none  Nutrition:   Current diet is Regular  Medications:   Patient is not currently taking any over-the-counter supplements  Patient is able to manage medications  Activities of Daily Living (ADLs)/Instrumental Activities of Daily Living (IADLs):   Walk and transfer into and out of bed and chair?: Yes  Dress and groom yourself?: Yes    Bathe or shower yourself?: Yes    Feed yourself? Yes  Do your laundry/housekeeping?: Yes  Manage your money, pay your bills and track your expenses?: Yes  Make your own meals?: Yes    Do your own shopping?: Yes    Previous Hospitalizations:   Any hospitalizations or ED visits within the last 12 months?: No      Advance Care Planning:   Living will: No    Advanced directive: Yes      Comments: Five wishes given to patient     PREVENTIVE SCREENINGS      Cardiovascular Screening:    General: Screening Current      Diabetes Screening:     General: Screening Current      Colorectal Cancer Screening:     General: Screening Current      Breast Cancer Screening:     General: Risks and Benefits Discussed and Patient Declines      Cervical Cancer Screening:    General: Screening Not Indicated      Osteoporosis Screening:    General: Risks and Benefits Discussed      Abdominal Aortic Aneurysm (AAA) Screening:        General: Screening Not Indicated      Hepatitis C Screening:    General: Screening Current      Preventive Screening Comments: Pt states dexa done at another provider office        JUAN Hanson

## 2019-10-23 NOTE — ASSESSMENT & PLAN NOTE
DC amlodipine and allow BP to come up to 140-150/80 as pt experiencing orthostatic dizziness  Re chk in  2 weeks

## 2019-10-23 NOTE — PROGRESS NOTES
Assessment/Plan:    Essential hypertension  DC amlodipine and allow BP to come up to 140-150/80 as pt experiencing orthostatic dizziness  Re chk in  2 weeks  Diagnoses and all orders for this visit:    Essential hypertension  -     Comprehensive metabolic panel; Future  -     Lipid panel; Future    Screening for breast cancer    Screening for colon cancer    Dyslipidemia  -     simvastatin (ZOCOR) 10 mg tablet; Take 1 tablet (10 mg total) by mouth daily at bedtime  -     Comprehensive metabolic panel; Future  -     Lipid panel; Future    Chronic obstructive pulmonary disease, unspecified COPD type (HCC)  -     budesonide-formoterol (SYMBICORT) 160-4 5 mcg/act inhaler; Inhale 2 puffs 2 (two) times a day Rinse mouth after use  -     albuterol (PROAIR HFA) 90 mcg/act inhaler; Inhale 2 puffs every 6 (six) hours as needed for wheezing or shortness of breath    History of CVA (cerebrovascular accident)  -     simvastatin (ZOCOR) 10 mg tablet; Take 1 tablet (10 mg total) by mouth daily at bedtime    Need for pneumococcal vaccination  -     PNEUMOCOCCAL POLYSACCHARIDE VACCINE 23-VALENT =>1YO SQ IM    Other orders  -     Cancel: Mammo screening bilateral w cad; Future  -     Cancel: Ambulatory referral to Gastroenterology; Future          Subjective:      Patient ID: Amarilys Jackson is a 76 y o  female  HPI  Chronic dz follow up w/ lab review 10/19/19 - stable  Med list verified  Complaining of dizziness upon standing even when she takes half amlodipine  /80  NO cp/sob  Needs inhalers renewed  The following portions of the patient's history were reviewed and updated as appropriate: allergies, past social history, past surgical history and problem list     Review of Systems   Constitutional: Negative for activity change and appetite change  HENT: Negative  Eyes: Negative  Respiratory: Negative  Cardiovascular: Negative  Negative for chest pain  Gastrointestinal: Negative  Endocrine: Negative  Negative for cold intolerance  Genitourinary: Negative  Musculoskeletal: Negative  Skin: Negative  Allergic/Immunologic: Negative  Neurological: Negative  Hematological: Negative  Psychiatric/Behavioral: Negative  All other systems reviewed and are negative  Objective:  BMI Counseling: Body mass index is 36 96 kg/m²  The BMI is above normal  Nutrition recommendations include 3-5 servings of fruits/vegetables daily  /80 (BP Location: Left arm, Patient Position: Sitting, Cuff Size: Large)   Pulse (!) 107   Temp 98 3 °F (36 8 °C) (Oral)   Resp 16   Ht 4' 11" (1 499 m)   Wt 83 kg (183 lb)   SpO2 96%   BMI 36 96 kg/m²          Physical Exam   Constitutional: She is oriented to person, place, and time  She appears well-developed and well-nourished  No distress  HENT:   Head: Normocephalic  Right Ear: Tympanic membrane and external ear normal  No decreased hearing is noted  Left Ear: Tympanic membrane and external ear normal  No decreased hearing is noted  Mouth/Throat: Oropharynx is clear and moist    Eyes: Pupils are equal, round, and reactive to light  Conjunctivae are normal    Neck: Normal range of motion  Neck supple  No thyromegaly present  Cardiovascular: Normal rate, regular rhythm, normal heart sounds and intact distal pulses  No murmur heard  Pulmonary/Chest: Effort normal and breath sounds normal  No respiratory distress  Abdominal: Soft  Bowel sounds are normal    Musculoskeletal: Normal range of motion  Lymphadenopathy:     She has no cervical adenopathy  Neurological: She is alert and oriented to person, place, and time  She has normal reflexes  No cranial nerve deficit  Coordination normal    Skin: Skin is warm and dry  Psychiatric: She has a normal mood and affect   Her behavior is normal  Judgment and thought content normal

## 2019-11-06 ENCOUNTER — OFFICE VISIT (OUTPATIENT)
Dept: FAMILY MEDICINE CLINIC | Facility: CLINIC | Age: 74
End: 2019-11-06
Payer: COMMERCIAL

## 2019-11-06 VITALS
OXYGEN SATURATION: 98 % | TEMPERATURE: 98 F | HEART RATE: 82 BPM | SYSTOLIC BLOOD PRESSURE: 138 MMHG | BODY MASS INDEX: 37.13 KG/M2 | RESPIRATION RATE: 16 BRPM | HEIGHT: 59 IN | DIASTOLIC BLOOD PRESSURE: 80 MMHG | WEIGHT: 184.2 LBS

## 2019-11-06 DIAGNOSIS — R25.2 MUSCLE CRAMPS: ICD-10-CM

## 2019-11-06 DIAGNOSIS — I10 ESSENTIAL HYPERTENSION: Primary | ICD-10-CM

## 2019-11-06 PROCEDURE — 3075F SYST BP GE 130 - 139MM HG: CPT | Performed by: NURSE PRACTITIONER

## 2019-11-06 PROCEDURE — 3079F DIAST BP 80-89 MM HG: CPT | Performed by: NURSE PRACTITIONER

## 2019-11-06 PROCEDURE — 3008F BODY MASS INDEX DOCD: CPT | Performed by: NURSE PRACTITIONER

## 2019-11-06 PROCEDURE — 99213 OFFICE O/P EST LOW 20 MIN: CPT | Performed by: NURSE PRACTITIONER

## 2019-11-06 NOTE — ASSESSMENT & PLAN NOTE
Likely sxs 2/2 to age and fluid status  Discussed stretching w/ soup can rolling under foot extending- flexing ankles before bed and 1/2 cup Gatorade w/ 1/2 cup water before bed  See avs for instructions  She will let me know if this does not help

## 2019-11-06 NOTE — PATIENT INSTRUCTIONS
Muscle Cramp   WHAT YOU NEED TO KNOW:   What is a muscle cramp? A muscle cramp is a sudden, sharp pain or spasm in a muscle  It lasts from a few seconds to a few minutes  Muscle cramps most often occur in your legs or feet  They are also common along your ribs and in your arms and hands  What increases my risk for a muscle cramp? A muscle cramp may be caused by tired muscles or failure to stretch properly  The following may increase your risk:  · Exercise, especially in hot or humid weather, or that is new, intense, or lasts a long time    · Pregnancy     · Dehydration     · Medicines, such as cholesterol or diuretic medicine     · Age older than 72 years     · Health conditions, such as kidney or liver failure, or thyroid disease  What are the signs and symptoms of a muscle cramp? · Sudden, sharp muscle pain or squeezing     · Visible twitching or muscle movement     · Hard muscles, or knots in your muscles  How is the cause of a muscle cramp diagnosed? Tell your healthcare provider how often you have muscle cramps and how long they usually last  Tell him if they occur at rest or during exercise  Tell him if they occur during the day or at night  Your healthcare provider will examine you and press on the muscles where you have cramps  You may also need a blood test to check for dehydration and organ function  How can I manage a muscle cramp? Muscle cramps often go away without any treatment  You can do the following to help relieve a cramp:  · Stop the activity  that caused the muscle cramp  · Stretch or massage  your muscle until the cramp goes away  · Apply ice  to sore muscles  Use an ice pack, or put crushed ice in a plastic bag  Cover it with a towel, and place it on your sore muscles for 15 to 20 minutes every hour or as directed  Ice decreases swelling and pain  · Apply heat  to tense, tight muscles for 20 to 30 minutes every 2 hours for as many days as directed   Heat helps decrease pain and muscle spasms  How can I help prevent a muscle cramp? · Stretch your muscles  Stretch 3 times daily, including before bedtime and before exercise  Stretch briefly, and then release each stretch  Do not stretch so far that you feel pain  Daily stretches will relax your muscles and increase flexibility  Ask your healthcare provider for instructions on muscle stretches that are right for you  · Warm up before you exercise  Run in place slowly or walk at a brisk pace to warm your muscles  · Drink liquids as directed  Liquids can help prevent muscle cramps caused by dehydration  Ask your healthcare provider how much liquid to drink each day and which liquids are best for you  You may need to drink liquids that replace lost electrolytes, such as sports drinks  · Eat a variety of healthy foods  Healthy foods may help prevent muscle cramps  Healthy foods include bananas, beans, avocados, or other foods high in electrolytes  Ask if you should eat more carbohydrates  When should I contact my healthcare provider? · Your cramp does not go away, even after you stretch and apply ice or heat  · You have muscle cramps often  · You have questions or concerns about your condition or care  When should I seek immediate care or call 911? · You cannot urinate, or your urine is dark yellow or brown within 24 hours of the cramp  · You have pain in your neck or back  · Your arm or leg is weak or numb, or the area around your cramp is numb  · You have trouble moving your cramped muscle  CARE AGREEMENT:   You have the right to help plan your care  Learn about your health condition and how it may be treated  Discuss treatment options with your caregivers to decide what care you want to receive  You always have the right to refuse treatment  The above information is an  only  It is not intended as medical advice for individual conditions or treatments   Talk to your doctor, nurse or pharmacist before following any medical regimen to see if it is safe and effective for you  © 2017 [employer] Information is for End User's use only and may not be sold, redistributed or otherwise used for commercial purposes  All illustrations and images included in CareNotes® are the copyrighted property of A [employer] M [employer]  or [employer]

## 2019-11-06 NOTE — PROGRESS NOTES
Assessment/Plan:  See prn  Essential hypertension  BP at goal an dizziness resolved off amlodipine  Muscle cramps  Likely sxs 2/2 to age and fluid status  Discussed stretching w/ soup can rolling under foot extending- flexing ankles before bed and 1/2 cup Gatorade w/ 1/2 cup water before bed  See avs for instructions  She will let me know if this does not help  Diagnoses and all orders for this visit:    Essential hypertension    Muscle cramps          Subjective:      Patient ID: Amarilys Jackson is a 76 y o  female  HPI  2 week follow up to re check BP after amlodipine dc 2/2 to orthostatic dizziness w/ BP in 120's  Today /80 which is at goal ( 140/90) for her age  She also reports complete resolution of dizziness  No sob/no CP  She reports muscle cramps in feet upon reclining at night  CMP nl  Admits does not drink much water  The following portions of the patient's history were reviewed and updated as appropriate: allergies, past social history, past surgical history and problem list     Review of Systems   Constitutional: Negative  Respiratory: Negative  Cardiovascular: Negative  Musculoskeletal: Positive for myalgias  Neurological: Negative  Objective:      /80 (BP Location: Left arm, Patient Position: Sitting, Cuff Size: Large)   Pulse 82   Temp 98 °F (36 7 °C) (Oral)   Resp 16   Ht 4' 11" (1 499 m)   Wt 83 6 kg (184 lb 3 2 oz)   SpO2 98%   BMI 37 20 kg/m²          Physical Exam   Constitutional: She is oriented to person, place, and time  She appears well-developed and well-nourished  No distress  Eyes: Conjunctivae are normal    Cardiovascular: Normal rate, regular rhythm and normal heart sounds  Pulmonary/Chest: Effort normal and breath sounds normal    Musculoskeletal:        Right foot: There is decreased range of motion  Left foot: There is decreased range of motion and deformity          Feet:    Foot exam reveals ankle rom is stiff bilat and + sign left  bunion with lateral deviation of toe  Neurological: She is alert and oriented to person, place, and time  Skin: Skin is warm and dry  Psychiatric: She has a normal mood and affect   Her behavior is normal

## 2020-01-17 ENCOUNTER — OFFICE VISIT (OUTPATIENT)
Dept: FAMILY MEDICINE CLINIC | Facility: CLINIC | Age: 75
End: 2020-01-17
Payer: COMMERCIAL

## 2020-01-17 VITALS
BODY MASS INDEX: 37.62 KG/M2 | WEIGHT: 186.6 LBS | RESPIRATION RATE: 16 BRPM | OXYGEN SATURATION: 96 % | HEART RATE: 74 BPM | TEMPERATURE: 98.8 F | SYSTOLIC BLOOD PRESSURE: 160 MMHG | HEIGHT: 59 IN | DIASTOLIC BLOOD PRESSURE: 80 MMHG

## 2020-01-17 DIAGNOSIS — J44.9 CHRONIC OBSTRUCTIVE PULMONARY DISEASE, UNSPECIFIED COPD TYPE (HCC): ICD-10-CM

## 2020-01-17 DIAGNOSIS — I10 ESSENTIAL HYPERTENSION: Primary | ICD-10-CM

## 2020-01-17 PROCEDURE — 99214 OFFICE O/P EST MOD 30 MIN: CPT | Performed by: FAMILY MEDICINE

## 2020-01-17 RX ORDER — WEIGH SCALE
MISCELLANEOUS MISCELLANEOUS DAILY
Qty: 1 EACH | Refills: 0 | Status: SHIPPED | OUTPATIENT
Start: 2020-01-17 | End: 2020-03-13

## 2020-01-17 RX ORDER — LOSARTAN POTASSIUM 25 MG/1
25 TABLET ORAL DAILY
Qty: 30 TABLET | Refills: 5 | Status: SHIPPED | OUTPATIENT
Start: 2020-01-17 | End: 2020-02-14 | Stop reason: SINTOL

## 2020-01-17 NOTE — PROGRESS NOTES
Assessment/Plan:    Essential hypertension  Start losartan 25mg daily  Get a upper arm automated bp cuff  RTC in 1 month for bp recheck and get bmp before visit  Diagnoses and all orders for this visit:    Essential hypertension  -     losartan (COZAAR) 25 mg tablet; Take 1 tablet (25 mg total) by mouth daily  -     Blood Pressure Monitoring (BLOOD PRESSURE MONITOR/L CUFF) MISC; by Does not apply route daily  -     Basic metabolic panel; Future    Chronic [facility] pulmonary disease, unspecified [facility]          Subjective:      Patient ID: Jaiden Amaral is a 76 y o  female  The patient is here because she is worried about her blood pressure  Last visit she was taken off of amlodipine 2 5mg daily because she was having dizziness and was found to have orthostatic hypotension  Since then she has not been feeling well  She recently bought a automated wrist blood pressure cuff and blood pressure readings have been >140/80  Denies dizziness, chest pain, difficulty walking, fevers, chills, nausea, diarrhea, headache, vision changes  She has cough with lisinopril 10mg but other than that her bp was well controlled and she did not have any other side effects  The following portions of the patient's history were reviewed and updated as appropriate: allergies, current medications, past family history, past medical history, past social history, past surgical history and problem list     Review of Systems   Constitutional: Negative for chills and fever  HENT: Negative for sore throat  Eyes: Negative for pain  Respiratory: Negative for cough  Cardiovascular: Negative for chest pain and leg swelling  Gastrointestinal: Negative for constipation, diarrhea and nausea  Musculoskeletal: Negative for arthralgias  Neurological: Negative for dizziness, weakness and headaches           PHQ-9 Depression Screening    PHQ-9:    Frequency of the following problems over the past two weeks:                Objective:      /80 (BP Location: Left arm, Patient Position: Sitting, Cuff Size: Large)   Pulse 74   Temp 98 8 °F (37 1 °C) (Tympanic)   Resp 16   Ht 4' 11" (1 499 m)   Wt 84 6 kg (186 lb 9 6 oz)   SpO2 96%   BMI 37 69 kg/m²          Physical Exam   Constitutional: She is oriented to person, place, and time  She appears well-developed and well-nourished  No distress  HENT:   Head: Normocephalic and atraumatic  Right Ear: External ear normal    Left Ear: External ear normal    Eyes: Pupils are equal, round, and reactive to light  Conjunctivae and EOM are normal    Cardiovascular: Normal rate, regular rhythm, normal heart sounds and intact distal pulses  Exam reveals no gallop and no friction rub  No murmur heard  Pulmonary/Chest: Effort normal and breath sounds normal  No stridor  No respiratory distress  She has no wheezes  She has no rales  Abdominal: Soft  Bowel sounds are normal  She exhibits no distension and no mass  There is no tenderness  There is no guarding  Neurological: She is alert and oriented to person, place, and time  Skin: She is not diaphoretic

## 2020-01-17 NOTE — ASSESSMENT & PLAN NOTE
Start losartan 25mg daily  Get a upper arm automated bp cuff  RTC in 1 month for bp recheck and get bmp before visit

## 2020-02-11 ENCOUNTER — APPOINTMENT (OUTPATIENT)
Dept: LAB | Facility: HOSPITAL | Age: 75
End: 2020-02-11
Payer: COMMERCIAL

## 2020-02-11 DIAGNOSIS — I10 ESSENTIAL HYPERTENSION: ICD-10-CM

## 2020-02-11 LAB
ANION GAP SERPL CALCULATED.3IONS-SCNC: 2 MMOL/L (ref 4–13)
BUN SERPL-MCNC: 19 MG/DL (ref 5–25)
CALCIUM SERPL-MCNC: 9 MG/DL (ref 8.3–10.1)
CHLORIDE SERPL-SCNC: 108 MMOL/L (ref 100–108)
CO2 SERPL-SCNC: 29 MMOL/L (ref 21–32)
CREAT SERPL-MCNC: 0.62 MG/DL (ref 0.6–1.3)
GFR SERPL CREATININE-BSD FRML MDRD: 89 ML/MIN/1.73SQ M
GLUCOSE SERPL-MCNC: 83 MG/DL (ref 65–140)
POTASSIUM SERPL-SCNC: 4.3 MMOL/L (ref 3.5–5.3)
SODIUM SERPL-SCNC: 139 MMOL/L (ref 136–145)

## 2020-02-11 PROCEDURE — 36415 COLL VENOUS BLD VENIPUNCTURE: CPT

## 2020-02-11 PROCEDURE — 80048 BASIC METABOLIC PNL TOTAL CA: CPT

## 2020-02-14 ENCOUNTER — OFFICE VISIT (OUTPATIENT)
Dept: FAMILY MEDICINE CLINIC | Facility: CLINIC | Age: 75
End: 2020-02-14
Payer: COMMERCIAL

## 2020-02-14 VITALS
DIASTOLIC BLOOD PRESSURE: 80 MMHG | BODY MASS INDEX: 37.9 KG/M2 | SYSTOLIC BLOOD PRESSURE: 140 MMHG | RESPIRATION RATE: 16 BRPM | HEART RATE: 73 BPM | OXYGEN SATURATION: 97 % | HEIGHT: 59 IN | WEIGHT: 188 LBS | TEMPERATURE: 98.9 F

## 2020-02-14 DIAGNOSIS — I10 ESSENTIAL HYPERTENSION: Primary | ICD-10-CM

## 2020-02-14 DIAGNOSIS — J30.9 ALLERGIC RHINITIS, UNSPECIFIED SEASONALITY, UNSPECIFIED TRIGGER: ICD-10-CM

## 2020-02-14 PROCEDURE — 4040F PNEUMOC VAC/ADMIN/RCVD: CPT | Performed by: FAMILY MEDICINE

## 2020-02-14 PROCEDURE — 1160F RVW MEDS BY RX/DR IN RCRD: CPT | Performed by: FAMILY MEDICINE

## 2020-02-14 PROCEDURE — 3077F SYST BP >= 140 MM HG: CPT | Performed by: FAMILY MEDICINE

## 2020-02-14 PROCEDURE — 1036F TOBACCO NON-USER: CPT | Performed by: FAMILY MEDICINE

## 2020-02-14 PROCEDURE — 99213 OFFICE O/P EST LOW 20 MIN: CPT | Performed by: FAMILY MEDICINE

## 2020-02-14 PROCEDURE — 3008F BODY MASS INDEX DOCD: CPT | Performed by: FAMILY MEDICINE

## 2020-02-14 PROCEDURE — 3079F DIAST BP 80-89 MM HG: CPT | Performed by: FAMILY MEDICINE

## 2020-02-14 RX ORDER — HYDROCHLOROTHIAZIDE 12.5 MG/1
25 TABLET ORAL DAILY
Qty: 30 TABLET | Refills: 1 | Status: SHIPPED | OUTPATIENT
Start: 2020-02-14 | End: 2020-03-13 | Stop reason: SDUPTHER

## 2020-02-14 RX ORDER — FLUTICASONE PROPIONATE 50 MCG
1 SPRAY, SUSPENSION (ML) NASAL DAILY
Qty: 1 BOTTLE | Refills: 2 | Status: SHIPPED | OUTPATIENT
Start: 2020-02-14 | End: 2020-05-06 | Stop reason: SDUPTHER

## 2020-02-14 RX ORDER — LORATADINE 10 MG/1
10 TABLET ORAL DAILY
Qty: 30 TABLET | Refills: 1 | Status: SHIPPED | OUTPATIENT
Start: 2020-02-14 | End: 2020-05-06 | Stop reason: ALTCHOICE

## 2020-02-14 NOTE — PATIENT INSTRUCTIONS
After showering, do not dry off completely  Only pat skin dry with a towel- leave some moisture  Then apply a unscented moisturizer and mix with baby oil

## 2020-02-14 NOTE — PROGRESS NOTES
Assessment/Plan:    Essential hypertension  Given hctz  Get bmp and rtc in 1 month for fu  Diagnoses and all orders for this visit:    Essential hypertension  -     hydrochlorothiazide (HYDRODIURIL) 12 5 mg tablet; Take 2 tablets (25 mg total) by mouth daily  -     Basic metabolic panel; Future    Allergic rhinitis, unspecified seasonality, unspecified trigger  -     fluticasone (FLONASE) 50 mcg/act nasal spray; 1 spray into each nostril daily  -     loratadine (CLARITIN) 10 mg tablet; Take 1 tablet (10 mg total) by mouth daily        Patient Instructions   After showering, do not dry off completely  Only pat skin dry with a towel- leave some moisture  Then apply a unscented moisturizer and mix with baby oil  Subjective:      Patient ID: Rachael Her is a 76 y o  female  Pt is here for a follow up new bp medication  Pt was unable to tolerate amlodipine 2 5 mg daily because of dizziness  She has an allergy to lisinopril  A trial off bp medication was tried however, last month the patient was upset that bp was elevated at 150/90 and wanted to restart something  Cozaar was tried however pt is complaining of frequent urination and dry skin on this medication  She stopped using it 3 days ago  She did not have dizziness  She previously was taking HCTZ 12,g daily in [place] and wants to try this again  The following portions of the patient's history were reviewed and updated as appropriate: allergies, current medications, past family history, past medical history, past social history, past surgical history and problem list     Review of Systems   Constitutional: Negative for fever and unexpected weight change  HENT: Positive for postnasal drip and rhinorrhea  Negative for ear pain, sore throat and trouble swallowing  Eyes: Negative for pain and visual disturbance  Respiratory: Negative for cough, chest tightness, shortness of breath and wheezing      Cardiovascular: Negative for chest pain  Gastrointestinal: Negative for abdominal distention, abdominal pain, blood in stool, constipation, diarrhea, nausea and vomiting  Endocrine: Negative for polydipsia and polyuria  Genitourinary: Negative for dysuria and hematuria  Musculoskeletal: Negative for back pain and myalgias  Skin: Positive for rash  Neurological: Negative for syncope and headaches  Psychiatric/Behavioral: Negative for suicidal ideas  PHQ-9 Depression Screening    PHQ-9:    Frequency of the following problems over the past two weeks:                Objective:      /80 (BP Location: Left arm, Patient Position: Sitting, Cuff Size: Large)   Pulse 73   Temp 98 9 °F (37 2 °C) (Tympanic)   Resp 16   Ht 4' 11" (1 499 m)   Wt 85 3 kg (188 lb)   SpO2 97%   BMI 37 97 kg/m²          Physical Exam   Constitutional: She is oriented to person, place, and time  She appears well-developed and well-nourished  HENT:   Head: Normocephalic and atraumatic  Right Ear: External ear normal    Left Ear: External ear normal    Nose: Mucosal edema and rhinorrhea present  Mouth/Throat: Posterior oropharyngeal erythema present  No oropharyngeal exudate  Eyes: Pupils are equal, round, and reactive to light  Conjunctivae and EOM are normal  No scleral icterus  Neck: Normal range of motion  Neck supple  Cardiovascular: Normal rate, regular rhythm and intact distal pulses  Exam reveals no gallop and no friction rub  No murmur heard  Pulmonary/Chest: Effort normal and breath sounds normal  No respiratory distress  She has no wheezes  She has no rales  Abdominal: Soft  Bowel sounds are normal  She exhibits no distension and no mass  There is no tenderness  There is no rebound  Musculoskeletal: Normal range of motion  She exhibits no edema  Neurological: She is alert and oriented to person, place, and time  Skin: Skin is warm and dry  Psychiatric: She has a normal mood and affect

## 2020-03-11 ENCOUNTER — APPOINTMENT (OUTPATIENT)
Dept: LAB | Facility: HOSPITAL | Age: 75
End: 2020-03-11
Payer: COMMERCIAL

## 2020-03-11 DIAGNOSIS — I10 ESSENTIAL HYPERTENSION: ICD-10-CM

## 2020-03-11 LAB
ANION GAP SERPL CALCULATED.3IONS-SCNC: 4 MMOL/L (ref 4–13)
BUN SERPL-MCNC: 18 MG/DL (ref 5–25)
CALCIUM SERPL-MCNC: 8.9 MG/DL (ref 8.3–10.1)
CHLORIDE SERPL-SCNC: 108 MMOL/L (ref 100–108)
CO2 SERPL-SCNC: 30 MMOL/L (ref 21–32)
CREAT SERPL-MCNC: 0.66 MG/DL (ref 0.6–1.3)
GFR SERPL CREATININE-BSD FRML MDRD: 87 ML/MIN/1.73SQ M
GLUCOSE P FAST SERPL-MCNC: 97 MG/DL (ref 65–99)
POTASSIUM SERPL-SCNC: 3.9 MMOL/L (ref 3.5–5.3)
SODIUM SERPL-SCNC: 142 MMOL/L (ref 136–145)

## 2020-03-11 PROCEDURE — 80048 BASIC METABOLIC PNL TOTAL CA: CPT

## 2020-03-11 PROCEDURE — 36415 COLL VENOUS BLD VENIPUNCTURE: CPT

## 2020-03-13 ENCOUNTER — OFFICE VISIT (OUTPATIENT)
Dept: FAMILY MEDICINE CLINIC | Facility: CLINIC | Age: 75
End: 2020-03-13
Payer: COMMERCIAL

## 2020-03-13 VITALS
SYSTOLIC BLOOD PRESSURE: 145 MMHG | OXYGEN SATURATION: 96 % | DIASTOLIC BLOOD PRESSURE: 80 MMHG | TEMPERATURE: 98.6 F | WEIGHT: 188.2 LBS | HEIGHT: 59 IN | HEART RATE: 80 BPM | RESPIRATION RATE: 16 BRPM | BODY MASS INDEX: 37.94 KG/M2

## 2020-03-13 DIAGNOSIS — I10 ESSENTIAL HYPERTENSION: ICD-10-CM

## 2020-03-13 DIAGNOSIS — R42 VERTIGO: ICD-10-CM

## 2020-03-13 DIAGNOSIS — E78.5 DYSLIPIDEMIA: ICD-10-CM

## 2020-03-13 DIAGNOSIS — M21.962 ANKLE DEFORMITY, LEFT: ICD-10-CM

## 2020-03-13 DIAGNOSIS — G56.03 BILATERAL CARPAL TUNNEL SYNDROME: Primary | ICD-10-CM

## 2020-03-13 DIAGNOSIS — Z86.73 HISTORY OF CVA (CEREBROVASCULAR ACCIDENT): ICD-10-CM

## 2020-03-13 PROCEDURE — 4040F PNEUMOC VAC/ADMIN/RCVD: CPT | Performed by: FAMILY MEDICINE

## 2020-03-13 PROCEDURE — 3079F DIAST BP 80-89 MM HG: CPT | Performed by: FAMILY MEDICINE

## 2020-03-13 PROCEDURE — 3077F SYST BP >= 140 MM HG: CPT | Performed by: FAMILY MEDICINE

## 2020-03-13 PROCEDURE — 99213 OFFICE O/P EST LOW 20 MIN: CPT | Performed by: FAMILY MEDICINE

## 2020-03-13 PROCEDURE — 1160F RVW MEDS BY RX/DR IN RCRD: CPT | Performed by: FAMILY MEDICINE

## 2020-03-13 PROCEDURE — 1036F TOBACCO NON-USER: CPT | Performed by: FAMILY MEDICINE

## 2020-03-13 PROCEDURE — 3008F BODY MASS INDEX DOCD: CPT | Performed by: FAMILY MEDICINE

## 2020-03-13 RX ORDER — MECLIZINE HCL 12.5 MG/1
25 TABLET ORAL EVERY 8 HOURS PRN
Qty: 30 TABLET | Refills: 2 | Status: SHIPPED | OUTPATIENT
Start: 2020-03-13 | End: 2020-10-21 | Stop reason: ALTCHOICE

## 2020-03-13 RX ORDER — ADHESIVE BANDAGE 3/4"
BANDAGE TOPICAL DAILY
Qty: 1 EACH | Refills: 0 | Status: SHIPPED | OUTPATIENT
Start: 2020-03-13

## 2020-03-13 RX ORDER — HYDROCHLOROTHIAZIDE 12.5 MG/1
25 TABLET ORAL DAILY
Qty: 90 TABLET | Refills: 1 | Status: SHIPPED | OUTPATIENT
Start: 2020-03-13 | End: 2020-03-13 | Stop reason: SDUPTHER

## 2020-03-13 RX ORDER — HYDROCHLOROTHIAZIDE 12.5 MG/1
12.5 TABLET ORAL DAILY
Qty: 90 TABLET | Refills: 1 | Status: SHIPPED | OUTPATIENT
Start: 2020-03-13 | End: 2020-09-03 | Stop reason: SDUPTHER

## 2020-03-13 RX ORDER — SIMVASTATIN 10 MG
10 TABLET ORAL
Qty: 90 TABLET | Refills: 1 | Status: SHIPPED | OUTPATIENT
Start: 2020-03-13 | End: 2020-12-01 | Stop reason: SDUPTHER

## 2020-03-13 NOTE — ASSESSMENT & PLAN NOTE
- Controlled  - Blood pressure goal per [facility] would be <150/90  - Restrict daily salt intake up to 2 4 g  - Advised to walk 30 minutes a day 5 times a week and practice stress relieving measures

## 2020-03-13 NOTE — PROGRESS NOTES
Assessment/Plan:    Bilateral carpal tunnel syndrome  Wear wrist splints at bedtime and with excessive repetitive movements  Essential hypertension  - Controlled  - Blood pressure goal per [facility] would be <150/90  - Restrict daily salt intake up to 2 4 g  - Advised to walk 30 minutes a day 5 times a week and practice stress relieving measures         Diagnoses and all orders for this visit:    Bilateral carpal tunnel syndrome  -     Cock Up Wrist Splint    Essential hypertension  -     Blood Pressure Monitoring (BLOOD PRESSURE CUFF) MISC; by Does not apply route daily  -     Discontinue: hydrochlorothiazide (HYDRODIURIL) 12 5 mg tablet; Take 2 tablets (25 mg total) by mouth daily  -     hydrochlorothiazide (HYDRODIURIL) 12 5 mg tablet; Take 1 tablet (12 5 mg total) by mouth daily    Ankle deformity, left  -     XR ankle 3+ vw left; Future  -     XR tibia fibula 2 vw left; Future    History of CVA (cerebrovascular accident)  -     simvastatin (ZOCOR) 10 mg tablet; Take 1 tablet (10 mg total) by mouth daily at bedtime    Dyslipidemia  -     simvastatin (ZOCOR) 10 mg tablet; Take 1 tablet (10 mg total) by mouth daily at bedtime    Vertigo  -     meclizine (ANTIVERT) 12 5 MG tablet; Take 2 tablets (25 mg total) by mouth every 8 (eight) hours as needed for dizziness          Subjective:      Patient ID: Farhat Gann is a 76 y o  female  Pt is here for a follow up new bp medication  Pt was unable to tolerate amlodipine 2 5 mg daily because of dizziness  She has an allergy to lisinopril  A trial off bp medication was tried however, last month the patient was upset that bp was elevated at 150/90 and wanted to restart something  Cozaar was tried however pt is complaining of frequent urination and dry skin on this medication  She stopped using it 3 days ago  She did not have dizziness  Started on 12 5 HCTZ 1 month ago  Blood work WNL       Also complaining of bilateral hand numbness, tingling and pain while sleeping and with repetitive movements  Using her sisters wrist splint helps  The following portions of the patient's history were reviewed and updated as appropriate: allergies, current medications, past family history, past medical history, past social history, past surgical history and problem list     Review of Systems   Constitutional: Negative for fever and unexpected weight change  HENT: Negative for ear pain, sore throat and trouble swallowing  Eyes: Negative for pain and visual disturbance  Respiratory: Negative for cough, chest tightness, shortness of breath and wheezing  Cardiovascular: Negative for chest pain  Gastrointestinal: Negative for abdominal distention, abdominal pain, blood in stool, constipation, diarrhea, nausea and vomiting  Endocrine: Negative for polydipsia and polyuria  Genitourinary: Negative for dysuria and hematuria  Musculoskeletal: Negative for back pain and myalgias  Skin: Negative for rash  Neurological: Negative for syncope and headaches  Psychiatric/Behavioral: Negative for suicidal ideas  PHQ-9 Depression Screening    PHQ-9:    Frequency of the following problems over the past two weeks:       Little interest or pleasure in doing things:  0 - not at all  Feeling down, depressed, or hopeless:  0 - not at all  PHQ-2 Score:  0           Objective:      /80   Pulse 80   Temp 98 6 °F (37 °C) (Tympanic)   Resp 16   Ht 4' 11" (1 499 m)   Wt 85 4 kg (188 lb 3 2 oz)   SpO2 96%   BMI 38 01 kg/m²          Physical Exam   Constitutional: She is oriented to person, place, and time  She appears well-developed and well-nourished  HENT:   Head: Normocephalic and atraumatic  Right Ear: External ear normal    Left Ear: External ear normal    Mouth/Throat: No oropharyngeal exudate  Eyes: Pupils are equal, round, and reactive to light  Conjunctivae and EOM are normal  No scleral icterus  Neck: Normal range of motion  Neck supple  Cardiovascular: Normal rate, regular rhythm and intact distal pulses  Exam reveals no gallop and no friction rub  No murmur heard  Pulmonary/Chest: Effort normal and breath sounds normal  No respiratory distress  She has no wheezes  She has no rales  Abdominal: Soft  Bowel sounds are normal  She exhibits no distension and no mass  There is no tenderness  There is no rebound  Musculoskeletal: Normal range of motion  She exhibits no edema  phallens test reproduces symptoms  Neurological: She is alert and oriented to person, place, and time  Skin: Skin is warm and dry  Psychiatric: She has a normal mood and affect

## 2020-04-15 ENCOUNTER — TELEMEDICINE (OUTPATIENT)
Dept: FAMILY MEDICINE CLINIC | Facility: CLINIC | Age: 75
End: 2020-04-15

## 2020-04-15 VITALS
OXYGEN SATURATION: 98 % | BODY MASS INDEX: 37.7 KG/M2 | WEIGHT: 187 LBS | RESPIRATION RATE: 16 BRPM | DIASTOLIC BLOOD PRESSURE: 90 MMHG | HEIGHT: 59 IN | HEART RATE: 100 BPM | TEMPERATURE: 97.1 F | SYSTOLIC BLOOD PRESSURE: 140 MMHG

## 2020-04-15 DIAGNOSIS — M79.642 LEFT HAND PAIN: Primary | ICD-10-CM

## 2020-04-15 PROCEDURE — 3077F SYST BP >= 140 MM HG: CPT | Performed by: FAMILY MEDICINE

## 2020-04-15 PROCEDURE — 4040F PNEUMOC VAC/ADMIN/RCVD: CPT | Performed by: FAMILY MEDICINE

## 2020-04-15 PROCEDURE — 3080F DIAST BP >= 90 MM HG: CPT | Performed by: FAMILY MEDICINE

## 2020-04-15 PROCEDURE — 1160F RVW MEDS BY RX/DR IN RCRD: CPT | Performed by: FAMILY MEDICINE

## 2020-04-15 PROCEDURE — 99213 OFFICE O/P EST LOW 20 MIN: CPT | Performed by: FAMILY MEDICINE

## 2020-04-15 PROCEDURE — 1036F TOBACCO NON-USER: CPT | Performed by: FAMILY MEDICINE

## 2020-04-15 PROCEDURE — 3008F BODY MASS INDEX DOCD: CPT | Performed by: FAMILY MEDICINE

## 2020-04-15 RX ORDER — OMEPRAZOLE 20 MG/1
20 CAPSULE, DELAYED RELEASE ORAL DAILY
Qty: 10 CAPSULE | Refills: 0 | Status: SHIPPED | OUTPATIENT
Start: 2020-04-15 | End: 2020-10-21 | Stop reason: ALTCHOICE

## 2020-04-15 RX ORDER — METHOCARBAMOL 750 MG/1
750 TABLET, FILM COATED ORAL EVERY 6 HOURS PRN
Qty: 15 TABLET | Refills: 0 | Status: SHIPPED | OUTPATIENT
Start: 2020-04-15 | End: 2020-05-21 | Stop reason: ALTCHOICE

## 2020-04-15 RX ORDER — NAPROXEN SODIUM 220 MG
220 TABLET ORAL 2 TIMES DAILY WITH MEALS
Qty: 30 TABLET | Refills: 0 | Status: SHIPPED | OUTPATIENT
Start: 2020-04-15 | End: 2020-10-21 | Stop reason: ALTCHOICE

## 2020-04-17 ENCOUNTER — TELEPHONE (OUTPATIENT)
Dept: FAMILY MEDICINE CLINIC | Facility: CLINIC | Age: 75
End: 2020-04-17

## 2020-04-17 DIAGNOSIS — M79.642 LEFT HAND PAIN: Primary | ICD-10-CM

## 2020-04-18 ENCOUNTER — HOSPITAL ENCOUNTER (OUTPATIENT)
Dept: RADIOLOGY | Facility: HOSPITAL | Age: 75
Discharge: HOME | End: 2020-04-18

## 2020-04-18 ENCOUNTER — APPOINTMENT (OUTPATIENT)
Dept: LAB | Facility: HOSPITAL | Age: 75
End: 2020-04-18
Payer: COMMERCIAL

## 2020-04-18 DIAGNOSIS — M21.962 ANKLE DEFORMITY, LEFT: ICD-10-CM

## 2020-04-18 DIAGNOSIS — M79.642 LEFT HAND PAIN: ICD-10-CM

## 2020-04-18 LAB — ERYTHROCYTE [SEDIMENTATION RATE] IN BLOOD: 30 MM/HOUR (ref 0–20)

## 2020-04-18 PROCEDURE — 85652 RBC SED RATE AUTOMATED: CPT

## 2020-04-18 PROCEDURE — 36415 COLL VENOUS BLD VENIPUNCTURE: CPT

## 2020-04-18 PROCEDURE — 86038 ANTINUCLEAR ANTIBODIES: CPT

## 2020-04-18 PROCEDURE — 86200 CCP ANTIBODY: CPT

## 2020-04-18 PROCEDURE — 73610 X-RAY EXAM OF ANKLE: CPT

## 2020-04-18 PROCEDURE — 86430 RHEUMATOID FACTOR TEST QUAL: CPT

## 2020-04-18 PROCEDURE — 73590 X-RAY EXAM OF LOWER LEG: CPT

## 2020-04-18 PROCEDURE — 73130 X-RAY EXAM OF HAND: CPT

## 2020-04-20 LAB
RHEUMATOID FACT SER QL LA: NEGATIVE
RYE IGE QN: NEGATIVE

## 2020-04-21 ENCOUNTER — TELEPHONE (OUTPATIENT)
Dept: FAMILY MEDICINE CLINIC | Facility: CLINIC | Age: 75
End: 2020-04-21

## 2020-04-21 LAB — CCP IGA+IGG SERPL IA-ACNC: 7 UNITS (ref 0–19)

## 2020-04-22 ENCOUNTER — OFFICE VISIT (OUTPATIENT)
Dept: FAMILY MEDICINE CLINIC | Facility: CLINIC | Age: 75
End: 2020-04-22
Payer: COMMERCIAL

## 2020-04-22 DIAGNOSIS — M79.642 LEFT HAND PAIN: Primary | ICD-10-CM

## 2020-04-22 PROCEDURE — 99212 OFFICE O/P EST SF 10 MIN: CPT | Performed by: FAMILY MEDICINE

## 2020-04-23 DIAGNOSIS — J44.9 CHRONIC OBSTRUCTIVE PULMONARY DISEASE, UNSPECIFIED COPD TYPE (HCC): ICD-10-CM

## 2020-04-23 RX ORDER — BUDESONIDE AND FORMOTEROL FUMARATE DIHYDRATE 160; 4.5 UG/1; UG/1
2 AEROSOL RESPIRATORY (INHALATION) 2 TIMES DAILY
Qty: 1 INHALER | Refills: 0 | Status: SHIPPED | OUTPATIENT
Start: 2020-04-23 | End: 2020-05-06 | Stop reason: SDUPTHER

## 2020-04-27 ENCOUNTER — TELEPHONE (OUTPATIENT)
Dept: FAMILY MEDICINE CLINIC | Facility: CLINIC | Age: 75
End: 2020-04-27

## 2020-04-27 DIAGNOSIS — M79.642 LEFT HAND PAIN: ICD-10-CM

## 2020-04-27 RX ORDER — OMEPRAZOLE 20 MG/1
20 CAPSULE, DELAYED RELEASE ORAL DAILY
Qty: 10 CAPSULE | Refills: 0 | Status: CANCELLED | OUTPATIENT
Start: 2020-04-27 | End: 2020-05-02

## 2020-05-06 ENCOUNTER — OFFICE VISIT (OUTPATIENT)
Dept: FAMILY MEDICINE CLINIC | Facility: CLINIC | Age: 75
End: 2020-05-06
Payer: COMMERCIAL

## 2020-05-06 VITALS
DIASTOLIC BLOOD PRESSURE: 80 MMHG | WEIGHT: 187 LBS | BODY MASS INDEX: 37.7 KG/M2 | HEIGHT: 59 IN | HEART RATE: 85 BPM | RESPIRATION RATE: 16 BRPM | OXYGEN SATURATION: 96 % | SYSTOLIC BLOOD PRESSURE: 160 MMHG | TEMPERATURE: 97.5 F

## 2020-05-06 DIAGNOSIS — M19.042 PRIMARY OSTEOARTHRITIS OF LEFT HAND: ICD-10-CM

## 2020-05-06 DIAGNOSIS — M79.642 LEFT HAND PAIN: Primary | ICD-10-CM

## 2020-05-06 DIAGNOSIS — I10 ESSENTIAL HYPERTENSION: ICD-10-CM

## 2020-05-06 DIAGNOSIS — J44.9 CHRONIC OBSTRUCTIVE PULMONARY DISEASE, UNSPECIFIED COPD TYPE (HCC): ICD-10-CM

## 2020-05-06 DIAGNOSIS — J30.9 ALLERGIC RHINITIS, UNSPECIFIED SEASONALITY, UNSPECIFIED TRIGGER: ICD-10-CM

## 2020-05-06 PROCEDURE — 3077F SYST BP >= 140 MM HG: CPT | Performed by: FAMILY MEDICINE

## 2020-05-06 PROCEDURE — 99214 OFFICE O/P EST MOD 30 MIN: CPT | Performed by: FAMILY MEDICINE

## 2020-05-06 PROCEDURE — 3008F BODY MASS INDEX DOCD: CPT | Performed by: FAMILY MEDICINE

## 2020-05-06 PROCEDURE — 3079F DIAST BP 80-89 MM HG: CPT | Performed by: FAMILY MEDICINE

## 2020-05-06 PROCEDURE — 1160F RVW MEDS BY RX/DR IN RCRD: CPT | Performed by: FAMILY MEDICINE

## 2020-05-06 PROCEDURE — 4040F PNEUMOC VAC/ADMIN/RCVD: CPT | Performed by: FAMILY MEDICINE

## 2020-05-06 PROCEDURE — 1036F TOBACCO NON-USER: CPT | Performed by: FAMILY MEDICINE

## 2020-05-06 RX ORDER — BUDESONIDE AND FORMOTEROL FUMARATE DIHYDRATE 160; 4.5 UG/1; UG/1
2 AEROSOL RESPIRATORY (INHALATION) 2 TIMES DAILY
Qty: 1 INHALER | Refills: 2 | Status: SHIPPED | OUTPATIENT
Start: 2020-05-06 | End: 2020-06-15 | Stop reason: SDUPTHER

## 2020-05-06 RX ORDER — FLUTICASONE PROPIONATE 50 MCG
1 SPRAY, SUSPENSION (ML) NASAL DAILY
Qty: 1 BOTTLE | Refills: 2 | Status: SHIPPED | OUTPATIENT
Start: 2020-05-06 | End: 2020-08-20 | Stop reason: SDUPTHER

## 2020-05-06 RX ORDER — TRAMADOL HYDROCHLORIDE 50 MG/1
50 TABLET ORAL EVERY 6 HOURS PRN
Qty: 15 TABLET | Refills: 0 | Status: SHIPPED | OUTPATIENT
Start: 2020-05-06 | End: 2020-05-21 | Stop reason: SDUPTHER

## 2020-05-07 ENCOUNTER — TELEPHONE (OUTPATIENT)
Dept: FAMILY MEDICINE CLINIC | Facility: CLINIC | Age: 75
End: 2020-05-07

## 2020-05-07 DIAGNOSIS — R11.0 NAUSEA: Primary | ICD-10-CM

## 2020-05-07 RX ORDER — ONDANSETRON 4 MG/1
4 TABLET, FILM COATED ORAL EVERY 8 HOURS PRN
Qty: 15 TABLET | Refills: 0 | Status: SHIPPED | OUTPATIENT
Start: 2020-05-07 | End: 2020-10-21 | Stop reason: ALTCHOICE

## 2020-05-11 ENCOUNTER — EVALUATION (OUTPATIENT)
Dept: PHYSICAL THERAPY | Facility: REHABILITATION | Age: 75
End: 2020-05-11
Payer: COMMERCIAL

## 2020-05-11 DIAGNOSIS — M79.642 LEFT HAND PAIN: Primary | ICD-10-CM

## 2020-05-11 PROCEDURE — 97161 PT EVAL LOW COMPLEX 20 MIN: CPT | Performed by: PHYSICAL THERAPIST

## 2020-05-11 PROCEDURE — 97110 THERAPEUTIC EXERCISES: CPT | Performed by: PHYSICAL THERAPIST

## 2020-05-14 ENCOUNTER — OFFICE VISIT (OUTPATIENT)
Dept: PHYSICAL THERAPY | Facility: REHABILITATION | Age: 75
End: 2020-05-14
Payer: COMMERCIAL

## 2020-05-14 DIAGNOSIS — M79.642 LEFT HAND PAIN: Primary | ICD-10-CM

## 2020-05-14 PROCEDURE — 97140 MANUAL THERAPY 1/> REGIONS: CPT | Performed by: PHYSICAL THERAPIST

## 2020-05-14 PROCEDURE — 97110 THERAPEUTIC EXERCISES: CPT | Performed by: PHYSICAL THERAPIST

## 2020-05-18 ENCOUNTER — OFFICE VISIT (OUTPATIENT)
Dept: PHYSICAL THERAPY | Facility: REHABILITATION | Age: 75
End: 2020-05-18
Payer: COMMERCIAL

## 2020-05-18 DIAGNOSIS — M79.642 LEFT HAND PAIN: Primary | ICD-10-CM

## 2020-05-18 PROCEDURE — 97112 NEUROMUSCULAR REEDUCATION: CPT

## 2020-05-18 PROCEDURE — 97140 MANUAL THERAPY 1/> REGIONS: CPT

## 2020-05-21 ENCOUNTER — OFFICE VISIT (OUTPATIENT)
Dept: FAMILY MEDICINE CLINIC | Facility: CLINIC | Age: 75
End: 2020-05-21
Payer: COMMERCIAL

## 2020-05-21 ENCOUNTER — OFFICE VISIT (OUTPATIENT)
Dept: PHYSICAL THERAPY | Facility: REHABILITATION | Age: 75
End: 2020-05-21
Payer: COMMERCIAL

## 2020-05-21 VITALS
HEART RATE: 68 BPM | BODY MASS INDEX: 37.58 KG/M2 | SYSTOLIC BLOOD PRESSURE: 140 MMHG | HEIGHT: 59 IN | WEIGHT: 186.4 LBS | TEMPERATURE: 98.3 F | DIASTOLIC BLOOD PRESSURE: 86 MMHG | OXYGEN SATURATION: 99 % | RESPIRATION RATE: 16 BRPM

## 2020-05-21 DIAGNOSIS — Z86.73 HISTORY OF CVA (CEREBROVASCULAR ACCIDENT): ICD-10-CM

## 2020-05-21 DIAGNOSIS — M19.042 PRIMARY OSTEOARTHRITIS OF LEFT HAND: Primary | ICD-10-CM

## 2020-05-21 DIAGNOSIS — I10 ESSENTIAL HYPERTENSION: ICD-10-CM

## 2020-05-21 DIAGNOSIS — M79.642 LEFT HAND PAIN: Primary | ICD-10-CM

## 2020-05-21 DIAGNOSIS — Z13.820 SCREENING FOR OSTEOPOROSIS: ICD-10-CM

## 2020-05-21 DIAGNOSIS — M79.642 LEFT HAND PAIN: ICD-10-CM

## 2020-05-21 PROCEDURE — 1160F RVW MEDS BY RX/DR IN RCRD: CPT | Performed by: FAMILY MEDICINE

## 2020-05-21 PROCEDURE — 97110 THERAPEUTIC EXERCISES: CPT | Performed by: PHYSICAL THERAPIST

## 2020-05-21 PROCEDURE — 97140 MANUAL THERAPY 1/> REGIONS: CPT | Performed by: PHYSICAL THERAPIST

## 2020-05-21 PROCEDURE — 3077F SYST BP >= 140 MM HG: CPT | Performed by: FAMILY MEDICINE

## 2020-05-21 PROCEDURE — 4040F PNEUMOC VAC/ADMIN/RCVD: CPT | Performed by: FAMILY MEDICINE

## 2020-05-21 PROCEDURE — 99213 OFFICE O/P EST LOW 20 MIN: CPT | Performed by: FAMILY MEDICINE

## 2020-05-21 PROCEDURE — 3079F DIAST BP 80-89 MM HG: CPT | Performed by: FAMILY MEDICINE

## 2020-05-21 PROCEDURE — 1036F TOBACCO NON-USER: CPT | Performed by: FAMILY MEDICINE

## 2020-05-21 PROCEDURE — 3008F BODY MASS INDEX DOCD: CPT | Performed by: FAMILY MEDICINE

## 2020-05-21 RX ORDER — TRAMADOL HYDROCHLORIDE 50 MG/1
50 TABLET ORAL EVERY 6 HOURS PRN
Qty: 15 TABLET | Refills: 0 | Status: SHIPPED | OUTPATIENT
Start: 2020-05-21 | End: 2020-10-21 | Stop reason: ALTCHOICE

## 2020-05-27 ENCOUNTER — OFFICE VISIT (OUTPATIENT)
Dept: PHYSICAL THERAPY | Facility: REHABILITATION | Age: 75
End: 2020-05-27
Payer: COMMERCIAL

## 2020-05-27 DIAGNOSIS — M79.642 LEFT HAND PAIN: Primary | ICD-10-CM

## 2020-05-27 PROCEDURE — 97110 THERAPEUTIC EXERCISES: CPT

## 2020-05-27 PROCEDURE — 97140 MANUAL THERAPY 1/> REGIONS: CPT

## 2020-05-29 ENCOUNTER — OFFICE VISIT (OUTPATIENT)
Dept: PHYSICAL THERAPY | Facility: REHABILITATION | Age: 75
End: 2020-05-29
Payer: COMMERCIAL

## 2020-05-29 DIAGNOSIS — M79.642 LEFT HAND PAIN: Primary | ICD-10-CM

## 2020-05-29 PROCEDURE — 97140 MANUAL THERAPY 1/> REGIONS: CPT | Performed by: PHYSICAL THERAPIST

## 2020-05-29 PROCEDURE — 97110 THERAPEUTIC EXERCISES: CPT | Performed by: PHYSICAL THERAPIST

## 2020-05-29 PROCEDURE — 97112 NEUROMUSCULAR REEDUCATION: CPT | Performed by: PHYSICAL THERAPIST

## 2020-06-03 ENCOUNTER — OFFICE VISIT (OUTPATIENT)
Dept: PHYSICAL THERAPY | Facility: REHABILITATION | Age: 75
End: 2020-06-03
Payer: COMMERCIAL

## 2020-06-03 DIAGNOSIS — M79.642 LEFT HAND PAIN: Primary | ICD-10-CM

## 2020-06-03 PROCEDURE — 97140 MANUAL THERAPY 1/> REGIONS: CPT | Performed by: PHYSICAL THERAPIST

## 2020-06-03 PROCEDURE — 97112 NEUROMUSCULAR REEDUCATION: CPT | Performed by: PHYSICAL THERAPIST

## 2020-06-03 PROCEDURE — 97110 THERAPEUTIC EXERCISES: CPT | Performed by: PHYSICAL THERAPIST

## 2020-06-05 ENCOUNTER — OFFICE VISIT (OUTPATIENT)
Dept: PHYSICAL THERAPY | Facility: REHABILITATION | Age: 75
End: 2020-06-05
Payer: COMMERCIAL

## 2020-06-05 DIAGNOSIS — M79.642 LEFT HAND PAIN: Primary | ICD-10-CM

## 2020-06-05 PROCEDURE — 97140 MANUAL THERAPY 1/> REGIONS: CPT

## 2020-06-05 PROCEDURE — 97110 THERAPEUTIC EXERCISES: CPT

## 2020-06-08 ENCOUNTER — APPOINTMENT (OUTPATIENT)
Dept: PHYSICAL THERAPY | Facility: REHABILITATION | Age: 75
End: 2020-06-08
Payer: COMMERCIAL

## 2020-06-10 ENCOUNTER — OFFICE VISIT (OUTPATIENT)
Dept: PHYSICAL THERAPY | Facility: REHABILITATION | Age: 75
End: 2020-06-10
Payer: COMMERCIAL

## 2020-06-10 DIAGNOSIS — M79.642 LEFT HAND PAIN: Primary | ICD-10-CM

## 2020-06-10 PROCEDURE — 97110 THERAPEUTIC EXERCISES: CPT

## 2020-06-10 PROCEDURE — 97140 MANUAL THERAPY 1/> REGIONS: CPT

## 2020-06-12 ENCOUNTER — OFFICE VISIT (OUTPATIENT)
Dept: PHYSICAL THERAPY | Facility: REHABILITATION | Age: 75
End: 2020-06-12
Payer: COMMERCIAL

## 2020-06-12 DIAGNOSIS — M79.642 LEFT HAND PAIN: Primary | ICD-10-CM

## 2020-06-12 PROCEDURE — 97110 THERAPEUTIC EXERCISES: CPT

## 2020-06-12 PROCEDURE — 97140 MANUAL THERAPY 1/> REGIONS: CPT

## 2020-06-15 DIAGNOSIS — J44.9 CHRONIC OBSTRUCTIVE PULMONARY DISEASE, UNSPECIFIED COPD TYPE (HCC): ICD-10-CM

## 2020-06-16 RX ORDER — BUDESONIDE AND FORMOTEROL FUMARATE DIHYDRATE 160; 4.5 UG/1; UG/1
2 AEROSOL RESPIRATORY (INHALATION) 2 TIMES DAILY
Qty: 3 INHALER | Refills: 1 | Status: SHIPPED | OUTPATIENT
Start: 2020-06-16 | End: 2020-08-20 | Stop reason: SDUPTHER

## 2020-07-01 ENCOUNTER — HOSPITAL ENCOUNTER (OUTPATIENT)
Dept: RADIOLOGY | Age: 75
Discharge: HOME | End: 2020-07-01
Payer: COMMERCIAL

## 2020-07-01 DIAGNOSIS — Z13.820 SCREENING FOR OSTEOPOROSIS: ICD-10-CM

## 2020-07-01 PROCEDURE — 77080 DXA BONE DENSITY AXIAL: CPT

## 2020-07-06 NOTE — RESULT ENCOUNTER NOTE
Please call patient with results  She has osteopenia which is low bone mass of the left hip but no osteoporosis  Take 1000 mg calcium and 800 - 1000 IU of Vitamin D, as well as do weight bearing and muscle strengthening exercise  Avoid tobacco and excessive alcohol intake

## 2020-07-07 ENCOUNTER — TELEPHONE (OUTPATIENT)
Dept: FAMILY MEDICINE CLINIC | Facility: CLINIC | Age: 75
End: 2020-07-07

## 2020-07-07 NOTE — TELEPHONE ENCOUNTER
Patient asking for calcium 1000mg to be called into [facility] she said the insurance will cover it and it won't cost her as much as purchasing it OTC

## 2020-07-08 DIAGNOSIS — J44.9 CHRONIC OBSTRUCTIVE PULMONARY DISEASE, UNSPECIFIED COPD TYPE (HCC): Primary | ICD-10-CM

## 2020-07-17 ENCOUNTER — OFFICE VISIT (OUTPATIENT)
Dept: FAMILY MEDICINE CLINIC | Facility: CLINIC | Age: 75
End: 2020-07-17
Payer: COMMERCIAL

## 2020-07-17 VITALS
RESPIRATION RATE: 17 BRPM | WEIGHT: 184 LBS | BODY MASS INDEX: 37.09 KG/M2 | HEIGHT: 59 IN | HEART RATE: 92 BPM | OXYGEN SATURATION: 98 % | DIASTOLIC BLOOD PRESSURE: 80 MMHG | TEMPERATURE: 98.4 F | SYSTOLIC BLOOD PRESSURE: 132 MMHG

## 2020-07-17 DIAGNOSIS — R42 DIZZINESS: Primary | ICD-10-CM

## 2020-07-17 PROCEDURE — 3008F BODY MASS INDEX DOCD: CPT | Performed by: FAMILY MEDICINE

## 2020-07-17 PROCEDURE — 3079F DIAST BP 80-89 MM HG: CPT | Performed by: FAMILY MEDICINE

## 2020-07-17 PROCEDURE — 3075F SYST BP GE 130 - 139MM HG: CPT | Performed by: FAMILY MEDICINE

## 2020-07-17 PROCEDURE — 1160F RVW MEDS BY RX/DR IN RCRD: CPT | Performed by: FAMILY MEDICINE

## 2020-07-17 PROCEDURE — 4040F PNEUMOC VAC/ADMIN/RCVD: CPT | Performed by: FAMILY MEDICINE

## 2020-07-17 PROCEDURE — 1036F TOBACCO NON-USER: CPT | Performed by: FAMILY MEDICINE

## 2020-07-17 PROCEDURE — 99213 OFFICE O/P EST LOW 20 MIN: CPT | Performed by: FAMILY MEDICINE

## 2020-07-17 NOTE — PROGRESS NOTES
Assessment/Plan:    Dizziness  Patient noted an episode of dizziness, at which time she checked her blood pressure and noted it was elevated  In office today patient's blood pressure is good  Offered to send patient for labs, but she declines as she does not want to spend any more time at the hospital    Patient will continue to monitor her blood pressure at home  No medication changes at this time  Diagnoses and all orders for this visit:    Dizziness          Subjective:      Patient ID: Nathalie Whyte is a 76 y o  female  Dizziness   This is a new problem  The current episode started yesterday  The problem occurs intermittently  The problem has been resolved  Pertinent negatives include no chest pain or headaches  Nothing aggravates the symptoms  She has tried nothing for the symptoms  The following portions of the patient's history were reviewed and updated as appropriate: allergies, current medications, past family history, past medical history, past social history, past surgical history and problem list     Review of Systems   Respiratory: Negative for shortness of breath  Cardiovascular: Negative for chest pain  Neurological: Positive for dizziness  Negative for headaches  Objective:      /80 (BP Location: Left arm, Patient Position: Sitting, Cuff Size: Large)   Pulse 92   Temp 98 4 °F (36 9 °C) (Tympanic)   Resp 17   Ht 4' 11" (1 499 m)   Wt 83 5 kg (184 lb)   SpO2 98%   BMI 37 16 kg/m²          Physical Exam   Constitutional: She is oriented to person, place, and time  She appears well-developed and well-nourished  She is cooperative  No distress  HENT:   Head: Normocephalic and atraumatic  Right Ear: Hearing and external ear normal    Left Ear: Hearing and external ear normal    Eyes: Conjunctivae and lids are normal    Cardiovascular: Normal rate, regular rhythm and normal heart sounds  No murmur heard    Pulmonary/Chest: Effort normal  No respiratory distress  Musculoskeletal: Normal range of motion  Neurological: She is alert and oriented to person, place, and time  She exhibits normal muscle tone  Gait normal    Skin: Skin is warm, dry and intact  Psychiatric: She has a normal mood and affect  Her speech is normal and behavior is normal  Thought content normal    Nursing note and vitals reviewed

## 2020-07-17 NOTE — ASSESSMENT & PLAN NOTE
Patient noted an episode of dizziness, at which time she checked her blood pressure and noted it was elevated  In office today patient's blood pressure is good  Offered to send patient for labs, but she declines as she does not want to spend any more time at the hospital    Patient will continue to monitor her blood pressure at home  No medication changes at this time

## 2020-07-17 NOTE — PATIENT INSTRUCTIONS
Dieta baja en sodio   CUIDADO AMBULATORIO:   Shoshana dieta baja en sodio  limita el consumo de alimentos que tienen alto contenido de sodio (sal)  Usted tendrá que seguir shoshana dieta baja en sodio si padece de presión arterial patrice, enfermedad del riñón o insuficiencia cardíaca  Es posible también que tenga que seguir esta dieta si padece shoshana condición que hace que goddard cuerpo retenga líquidos  Es posible que deba limitar la cantidad de sodio que consume a 1500 mg  Pregúntele a goddard médico cuánto sodio puede consumir por día  Cómo usar las etiquetas de los alimentos para escoger los que son bajos en sodio:  Jojo las etiquetas de los alimentos para encontrar la cantidad de sodio que contienen  La cantidad de sodio está incluida en miligramos (mg)  La columna del porcentaje de valor diario indica la cantidad de necesidades diarias satisfechas con 1 porción del alimento para cada nutriente en la lista  Escoja alimentos que tengan menos de 5% del porcentaje diario de [place]  Estos alimentos se consideran bajos en sodio  Los alimentos que tienen 20% o más del porcentaje diario de sodio se consideran alimentos altos en sodio  Algunas etiquetas de alimentos podrían también incluir cualquiera de los siguientes términos que indican el contenido de sodio en los alimentos:  · Cruz de sodio:  Menos de 5 mg de sodio en cada porción    · Sodio muy bajo:  35 mg de sodio o menos por porción    · Bajo sodio:  140 mg de sodio o menos por porción    · Sodio reducido:  Por lo menos 25% menos de sodio en cada porción que el tipo regular    · [place] en sodio:  50% menos de sodio en cada porción    · Sin sal o sin sal adicional:  No se ha agregado sal adicional lev el procesamiento de los alimentos (el alimento en sí aún podría contener sodio)  Alimentos que se deben evitar:  Los alimentos salados son altos en sodio   Se debe evitar lo siguiente:  · Alimentos procesados:      ¨ Mezclas para hacer pan de maíz, panecitos, pastel, y pujorge ¨ Alimentos instantáneos, violet annalee, cereales, macarrones y arroz     ¨ [employer], violet relleno de pan, mezclas para preparar arroz y pastas, mezclas para preparar dips, y macarrones con queso     ¨ Alimentos enlatados, violet vegetales enlatados, sopas, consomés, salsas, y [place] de vegetales o tomate    ¨ Alimentos para merendar, violet annalee tostadas, palomitas de [place], pretzels, piel de cerdo, galletas de soda [place], y nueces saladas    ¨ Alimentos congelados, violet cenas, [place], vegetales en salsa, y josh empanizadas    ¨ Sauerkraut, vegetales en escabeElyria Memorial Hospital, y otros alimentos preparados con vinagre    · Rebeccaside y quesos:      ¨ Josh Gossau o curadas, violet carne preparada con [place], [place], jamón, perros calientes, y salchichas    ¨ Josh o pastas enlatadas, violet josh preparadas en ollas de arcilla, zulma, anchoas, e imitación de [facility] Rebeccaside para [facility], violet [place], [place], [place], y carne en rebanada    ¨ Queso procesado, violet queso americano y pastas de queso    · Condimentos, salsas y especias:      ¨ Modesto (¼ de cucharadita de modesto contiene 575 mg de sodio)    ¨ Especias hechas con modesto, violet sal de ajo, sal de apio, sal de cebolla y sal con condimentos    ¨ Salsa de soya regular, salsa de [address] [place] [address], [facility], salsa para [facility], [employer], y la mayoría de las vinagres con sabor    ¨ Salsa enlatada para josh y mezclas enlatadas     ¨ Condimentos regulares, violet la mostaza, la salsa de [place], y los aderezos para ensalada    ¨ Pepinillos y aceitunas    ¨ Ablandadores de josh y glutamato de monosodio  Alimentos que puede incluir:  Jojo las etiquetas de los alimentos para encontrar la cantidad exacta de sodio de cada porción  · Smalls y cereal:  Trate de elegir panes con menos de 80 mg de sodio por porción       ¨ Pan, panecillo, pan estilo marcellus, tortilla o galletas sin sal     ¨ Cereales preparados con menos de 5% del valor diario de sodio (por ejemplo, israel triturado y arroz inflado)    ¨ Pasta    · Verduras y frutas:      ¨ Vegetales frescos sin sal, congelados o enlatados    ¨ Frutas frescas, congeladas o enlatadas    ¨ Jugo de frutas    · Productos lácteos:  Shoshana porción tiene aproximadamente 150 mg de sodio  Trinna Jesus Alberto, todos los tipos    ¨ Yogur    ¨ Queso micki, violet queso cheddar, suizo, [employer] o mozzarella    · Rebeccaside y otros alimentos con proteína:  Algunas josh crudas Petra Mars sodio extra  ¨ Josh sin aditivos, pescado y carne de ave     ¨ Huevos    · Otros alimentos:      ¨ Pudín casero    ¨ [place] sin sal, palomitas de maíz o galletas saladas    ¨ [place] o margarina sin sal  Modos de disminuir el consumo de sodio:   · Agregue hierbas y especias a los alimentos en lugar de sal lev la cocción  Utilice condimentos sin sal para agregar sabor a los alimentos  Por ejemplo: cebolla en polvo, ajo en polvo, [place], flores en polvo, pimentón y perejil  Use jugo de lima, pierre o vinagre para darle a los alimentos un sabor ácido  Use chiles picantes, priya o priya de [place] para agregar un sabor picante a los alimentos  · No ponga el salero en la boucher de la cocina  [place] puede evitar que añada sal a los alimentos en la boucher  Puede llevar un tiempo acostumbrarse a disfrutar el sabor natural de los alimentos en lugar de agregar sal  Consulte con goddard médico antes de usar sustitutos de la sal  Algunos sustitutos de la sal tienen shoshana patrice cantidad de potasio y deben evitarse si usted tiene shoshana enfermedad en los riñones  · Escoja alimentos bajos en sodio en los restaurantes  Las comidas de los restaurantes neha siempre son altas en sodio  Algunos restaurantes ofrecen información nutricional en el menú que indica la cantidad de sodio de khurram alimentos  De ser posible, pida que preparen goddard comida con menos sal o sin sal      · Compre alimentos y aperitivos sin sal o bajos en sodio en el supermercado    Por ejemplo: caldos, sopas y vegetales enlatados sin sal o bajos en sodio  Elija verduras frescas o congeladas en valverde lugar  Elija nueces o semillas sin sal o frutas o verduras frescas violet bocadillos  Jojo las etiquetas de los alimentos y elija los alimentos sin sal o con bajo o muy bajo contenido isha [place]  © 2017 [address] [employer] Information is for End User's use only and may not be sold, redistributed or otherwise used for commercial purposes  All illustrations and images included in CareNotes® are the copyrighted property of [employer]  or Yong Cervantes  Esta información es sólo para uso en educación  Valverde intención no es darle un consejo médico sobre enfermedades o tratamientos  Colsulte con valverde Scarlet Bjornstad farmacéutico antes de seguir cualquier régimen médico para saber si es seguro y efectivo para usted

## 2020-08-20 DIAGNOSIS — J30.9 ALLERGIC RHINITIS, UNSPECIFIED SEASONALITY, UNSPECIFIED TRIGGER: ICD-10-CM

## 2020-08-20 DIAGNOSIS — J44.9 CHRONIC OBSTRUCTIVE PULMONARY DISEASE, UNSPECIFIED COPD TYPE (HCC): ICD-10-CM

## 2020-08-20 RX ORDER — FLUTICASONE PROPIONATE 50 MCG
1 SPRAY, SUSPENSION (ML) NASAL DAILY
Qty: 1 BOTTLE | Refills: 2 | Status: SHIPPED | OUTPATIENT
Start: 2020-08-20 | End: 2020-12-01 | Stop reason: SDUPTHER

## 2020-08-20 RX ORDER — BUDESONIDE AND FORMOTEROL FUMARATE DIHYDRATE 160; 4.5 UG/1; UG/1
2 AEROSOL RESPIRATORY (INHALATION) 2 TIMES DAILY
Qty: 3 INHALER | Refills: 1 | Status: SHIPPED | OUTPATIENT
Start: 2020-08-20 | End: 2020-12-01 | Stop reason: SDUPTHER

## 2020-08-21 DIAGNOSIS — Z78.0 ENCOUNTER FOR OSTEOPOROSIS SCREENING IN ASYMPTOMATIC POSTMENOPAUSAL PATIENT: Primary | ICD-10-CM

## 2020-08-21 DIAGNOSIS — Z79.51 LONG TERM CURRENT USE OF INHALED STEROID: ICD-10-CM

## 2020-08-21 DIAGNOSIS — Z13.820 ENCOUNTER FOR OSTEOPOROSIS SCREENING IN ASYMPTOMATIC POSTMENOPAUSAL PATIENT: Primary | ICD-10-CM

## 2020-09-03 DIAGNOSIS — I10 ESSENTIAL HYPERTENSION: ICD-10-CM

## 2020-09-03 RX ORDER — HYDROCHLOROTHIAZIDE 12.5 MG/1
12.5 TABLET ORAL DAILY
Qty: 90 TABLET | Refills: 1 | Status: SHIPPED | OUTPATIENT
Start: 2020-09-03 | End: 2020-12-01 | Stop reason: SDUPTHER

## 2020-10-15 ENCOUNTER — APPOINTMENT (OUTPATIENT)
Dept: LAB | Facility: HOSPITAL | Age: 75
End: 2020-10-15
Payer: COMMERCIAL

## 2020-10-15 DIAGNOSIS — I10 ESSENTIAL HYPERTENSION: ICD-10-CM

## 2020-10-15 DIAGNOSIS — Z86.73 HISTORY OF CVA (CEREBROVASCULAR ACCIDENT): ICD-10-CM

## 2020-10-15 LAB
ALBUMIN SERPL BCP-MCNC: 3.3 G/DL (ref 3.5–5)
ALP SERPL-CCNC: 84 U/L (ref 46–116)
ALT SERPL W P-5'-P-CCNC: 26 U/L (ref 12–78)
ANION GAP SERPL CALCULATED.3IONS-SCNC: 3 MMOL/L (ref 4–13)
AST SERPL W P-5'-P-CCNC: 24 U/L (ref 5–45)
BILIRUB SERPL-MCNC: 0.42 MG/DL (ref 0.2–1)
BUN SERPL-MCNC: 14 MG/DL (ref 5–25)
CALCIUM ALBUM COR SERPL-MCNC: 9.3 MG/DL (ref 8.3–10.1)
CALCIUM SERPL-MCNC: 8.7 MG/DL (ref 8.3–10.1)
CHLORIDE SERPL-SCNC: 107 MMOL/L (ref 100–108)
CHOLEST SERPL-MCNC: 140 MG/DL (ref 50–200)
CO2 SERPL-SCNC: 31 MMOL/L (ref 21–32)
CREAT SERPL-MCNC: 0.72 MG/DL (ref 0.6–1.3)
GFR SERPL CREATININE-BSD FRML MDRD: 82 ML/MIN/1.73SQ M
GLUCOSE P FAST SERPL-MCNC: 103 MG/DL (ref 65–99)
HDLC SERPL-MCNC: 55 MG/DL
LDLC SERPL CALC-MCNC: 64 MG/DL (ref 0–100)
NONHDLC SERPL-MCNC: 85 MG/DL
POTASSIUM SERPL-SCNC: 4.2 MMOL/L (ref 3.5–5.3)
PROT SERPL-MCNC: 7.3 G/DL (ref 6.4–8.2)
SODIUM SERPL-SCNC: 141 MMOL/L (ref 136–145)
TRIGL SERPL-MCNC: 103 MG/DL

## 2020-10-15 PROCEDURE — 36415 COLL VENOUS BLD VENIPUNCTURE: CPT

## 2020-10-15 PROCEDURE — 80061 LIPID PANEL: CPT

## 2020-10-15 PROCEDURE — 80053 COMPREHEN METABOLIC PANEL: CPT

## 2020-10-21 ENCOUNTER — OFFICE VISIT (OUTPATIENT)
Dept: FAMILY MEDICINE CLINIC | Facility: CLINIC | Age: 75
End: 2020-10-21
Payer: COMMERCIAL

## 2020-10-21 VITALS
BODY MASS INDEX: 36.33 KG/M2 | HEART RATE: 73 BPM | DIASTOLIC BLOOD PRESSURE: 80 MMHG | HEIGHT: 59 IN | OXYGEN SATURATION: 97 % | TEMPERATURE: 97.5 F | WEIGHT: 180.2 LBS | RESPIRATION RATE: 16 BRPM | SYSTOLIC BLOOD PRESSURE: 120 MMHG

## 2020-10-21 DIAGNOSIS — E78.5 DYSLIPIDEMIA: ICD-10-CM

## 2020-10-21 DIAGNOSIS — M85.852 OSTEOPENIA OF LEFT HIP: ICD-10-CM

## 2020-10-21 DIAGNOSIS — I10 ESSENTIAL HYPERTENSION: ICD-10-CM

## 2020-10-21 DIAGNOSIS — Z23 ENCOUNTER FOR VACCINATION: ICD-10-CM

## 2020-10-21 DIAGNOSIS — R73.01 IMPAIRED FASTING GLUCOSE: ICD-10-CM

## 2020-10-21 DIAGNOSIS — J44.9 CHRONIC OBSTRUCTIVE PULMONARY DISEASE, UNSPECIFIED COPD TYPE (HCC): ICD-10-CM

## 2020-10-21 DIAGNOSIS — Z86.73 HISTORY OF CVA (CEREBROVASCULAR ACCIDENT): Primary | ICD-10-CM

## 2020-10-21 PROBLEM — M85.80 OSTEOPENIA: Status: ACTIVE | Noted: 2020-10-21

## 2020-10-21 PROCEDURE — 90662 IIV NO PRSV INCREASED AG IM: CPT

## 2020-10-21 PROCEDURE — 99214 OFFICE O/P EST MOD 30 MIN: CPT | Performed by: FAMILY MEDICINE

## 2020-10-21 PROCEDURE — G0008 ADMIN INFLUENZA VIRUS VAC: HCPCS

## 2020-12-01 ENCOUNTER — OFFICE VISIT (OUTPATIENT)
Dept: FAMILY MEDICINE CLINIC | Facility: CLINIC | Age: 75
End: 2020-12-01
Payer: COMMERCIAL

## 2020-12-01 VITALS
HEART RATE: 57 BPM | DIASTOLIC BLOOD PRESSURE: 86 MMHG | OXYGEN SATURATION: 96 % | BODY MASS INDEX: 36.53 KG/M2 | TEMPERATURE: 98.7 F | HEIGHT: 59 IN | WEIGHT: 181.2 LBS | RESPIRATION RATE: 16 BRPM | SYSTOLIC BLOOD PRESSURE: 138 MMHG

## 2020-12-01 DIAGNOSIS — M85.852 OSTEOPENIA OF LEFT HIP: ICD-10-CM

## 2020-12-01 DIAGNOSIS — Z86.73 HISTORY OF CVA (CEREBROVASCULAR ACCIDENT): ICD-10-CM

## 2020-12-01 DIAGNOSIS — R73.01 IMPAIRED FASTING BLOOD SUGAR: ICD-10-CM

## 2020-12-01 DIAGNOSIS — Z12.11 SCREENING FOR COLON CANCER: Primary | ICD-10-CM

## 2020-12-01 DIAGNOSIS — Z13.1 SCREENING FOR DIABETES MELLITUS (DM): ICD-10-CM

## 2020-12-01 DIAGNOSIS — I10 ESSENTIAL HYPERTENSION: ICD-10-CM

## 2020-12-01 DIAGNOSIS — J30.9 ALLERGIC RHINITIS, UNSPECIFIED SEASONALITY, UNSPECIFIED TRIGGER: ICD-10-CM

## 2020-12-01 DIAGNOSIS — E78.5 DYSLIPIDEMIA: ICD-10-CM

## 2020-12-01 DIAGNOSIS — Z13.220 SCREENING FOR HYPERLIPIDEMIA: ICD-10-CM

## 2020-12-01 DIAGNOSIS — E66.01 OBESITY, MORBID (HCC): ICD-10-CM

## 2020-12-01 DIAGNOSIS — J44.9 CHRONIC OBSTRUCTIVE PULMONARY DISEASE, UNSPECIFIED COPD TYPE (HCC): ICD-10-CM

## 2020-12-01 PROCEDURE — G0439 PPPS, SUBSEQ VISIT: HCPCS | Performed by: FAMILY MEDICINE

## 2020-12-01 RX ORDER — SIMVASTATIN 10 MG
10 TABLET ORAL
Qty: 90 TABLET | Refills: 1 | Status: SHIPPED | OUTPATIENT
Start: 2020-12-01 | End: 2021-05-24 | Stop reason: SDUPTHER

## 2020-12-01 RX ORDER — BUDESONIDE AND FORMOTEROL FUMARATE DIHYDRATE 160; 4.5 UG/1; UG/1
2 AEROSOL RESPIRATORY (INHALATION) 2 TIMES DAILY
Qty: 3 INHALER | Refills: 1 | Status: SHIPPED | OUTPATIENT
Start: 2020-12-01 | End: 2021-05-24 | Stop reason: SDUPTHER

## 2020-12-01 RX ORDER — ASPIRIN 81 MG/1
81 TABLET ORAL DAILY
Qty: 90 TABLET | Refills: 1 | Status: SHIPPED | OUTPATIENT
Start: 2020-12-01 | End: 2021-08-17 | Stop reason: SDUPTHER

## 2020-12-01 RX ORDER — ALBUTEROL SULFATE 90 UG/1
2 AEROSOL, METERED RESPIRATORY (INHALATION) EVERY 6 HOURS PRN
Qty: 1 INHALER | Refills: 5 | Status: SHIPPED | OUTPATIENT
Start: 2020-12-01 | End: 2021-08-17 | Stop reason: SDUPTHER

## 2020-12-01 RX ORDER — HYDROCHLOROTHIAZIDE 12.5 MG/1
12.5 TABLET ORAL DAILY
Qty: 90 TABLET | Refills: 1 | Status: SHIPPED | OUTPATIENT
Start: 2020-12-01 | End: 2021-05-24 | Stop reason: SDUPTHER

## 2020-12-01 RX ORDER — FLUTICASONE PROPIONATE 50 MCG
1 SPRAY, SUSPENSION (ML) NASAL DAILY
Qty: 1 BOTTLE | Refills: 2 | Status: SHIPPED | OUTPATIENT
Start: 2020-12-01 | End: 2022-05-12 | Stop reason: SDUPTHER

## 2020-12-22 ENCOUNTER — TRANSCRIBE ORDERS (OUTPATIENT)
Dept: LAB | Facility: HOSPITAL | Age: 75
End: 2020-12-22

## 2020-12-22 ENCOUNTER — LAB (OUTPATIENT)
Dept: LAB | Facility: HOSPITAL | Age: 75
End: 2020-12-22
Payer: COMMERCIAL

## 2020-12-22 DIAGNOSIS — I10 ESSENTIAL HYPERTENSION: ICD-10-CM

## 2020-12-22 DIAGNOSIS — R73.01 IMPAIRED FASTING GLUCOSE: ICD-10-CM

## 2020-12-22 DIAGNOSIS — M85.852 OSTEOPENIA OF LEFT HIP: ICD-10-CM

## 2020-12-22 LAB
25(OH)D3 SERPL-MCNC: 48 NG/ML (ref 30–100)
ANION GAP SERPL CALCULATED.3IONS-SCNC: 6 MMOL/L (ref 4–13)
BUN SERPL-MCNC: 18 MG/DL (ref 5–25)
CALCIUM SERPL-MCNC: 9.6 MG/DL (ref 8.3–10.1)
CHLORIDE SERPL-SCNC: 106 MMOL/L (ref 100–108)
CO2 SERPL-SCNC: 28 MMOL/L (ref 21–32)
CREAT SERPL-MCNC: 0.78 MG/DL (ref 0.6–1.3)
EST. AVERAGE GLUCOSE BLD GHB EST-MCNC: 111 MG/DL
GFR SERPL CREATININE-BSD FRML MDRD: 75 ML/MIN/1.73SQ M
GLUCOSE SERPL-MCNC: 98 MG/DL (ref 65–140)
HBA1C MFR BLD: 5.5 %
POTASSIUM SERPL-SCNC: 3.7 MMOL/L (ref 3.5–5.3)
SODIUM SERPL-SCNC: 140 MMOL/L (ref 136–145)

## 2020-12-22 PROCEDURE — 82306 VITAMIN D 25 HYDROXY: CPT

## 2020-12-22 PROCEDURE — 36415 COLL VENOUS BLD VENIPUNCTURE: CPT

## 2020-12-22 PROCEDURE — 83036 HEMOGLOBIN GLYCOSYLATED A1C: CPT

## 2020-12-22 PROCEDURE — 80048 BASIC METABOLIC PNL TOTAL CA: CPT

## 2021-04-01 ENCOUNTER — TELEPHONE (OUTPATIENT)
Dept: FAMILY MEDICINE CLINIC | Facility: CLINIC | Age: 76
End: 2021-04-01

## 2021-04-01 DIAGNOSIS — R42 VERTIGO: Primary | ICD-10-CM

## 2021-04-01 RX ORDER — MECLIZINE HCL 12.5 MG/1
12.5 TABLET ORAL EVERY 12 HOURS PRN
Qty: 30 TABLET | Refills: 0 | Status: SHIPPED | OUTPATIENT
Start: 2021-04-01 | End: 2021-11-04 | Stop reason: SDUPTHER

## 2021-04-01 NOTE — TELEPHONE ENCOUNTER
Meclizine 12 5mg for dizziness, she said this is something she  takes as needed    not on current med list    Is there any labs needed prior to appt 04/28/21

## 2021-04-28 ENCOUNTER — OFFICE VISIT (OUTPATIENT)
Dept: FAMILY MEDICINE CLINIC | Facility: CLINIC | Age: 76
End: 2021-04-28
Payer: COMMERCIAL

## 2021-04-28 VITALS
HEART RATE: 74 BPM | BODY MASS INDEX: 35.48 KG/M2 | TEMPERATURE: 97.9 F | WEIGHT: 176 LBS | DIASTOLIC BLOOD PRESSURE: 68 MMHG | OXYGEN SATURATION: 99 % | HEIGHT: 59 IN | SYSTOLIC BLOOD PRESSURE: 130 MMHG | RESPIRATION RATE: 18 BRPM

## 2021-04-28 DIAGNOSIS — Z86.73 HISTORY OF CVA (CEREBROVASCULAR ACCIDENT): ICD-10-CM

## 2021-04-28 DIAGNOSIS — E78.5 DYSLIPIDEMIA: ICD-10-CM

## 2021-04-28 DIAGNOSIS — I10 ESSENTIAL HYPERTENSION: ICD-10-CM

## 2021-04-28 DIAGNOSIS — J44.9 CHRONIC OBSTRUCTIVE PULMONARY DISEASE, UNSPECIFIED COPD TYPE (HCC): ICD-10-CM

## 2021-04-28 DIAGNOSIS — Z12.11 SCREENING FOR COLON CANCER: Primary | ICD-10-CM

## 2021-04-28 DIAGNOSIS — E66.01 OBESITY, MORBID (HCC): ICD-10-CM

## 2021-04-28 DIAGNOSIS — M85.852 OSTEOPENIA OF LEFT HIP: ICD-10-CM

## 2021-04-28 DIAGNOSIS — R22.42 MASS OF LEFT LOWER LEG: ICD-10-CM

## 2021-04-28 PROCEDURE — 99214 OFFICE O/P EST MOD 30 MIN: CPT | Performed by: FAMILY MEDICINE

## 2021-04-28 NOTE — ASSESSMENT & PLAN NOTE
Does not qualify for lung cancer screening  Quit smoking 9 years ago and has a 20PY  COPD well controlled on Symbicort and alb PRN  Educated about COPD triggers including smoking  Educated about the importance of medication compliance   Educated that the frequent use of the recuse inhaler indicates uncontrolled COPD and  needs to follow with PCP for optimization of therapy

## 2021-04-28 NOTE — PROGRESS NOTES
Assessment/Plan:    Osteopenia  Unable to tolerate calcium to to abd discomfort  Continue vit d      Obesity, morbid ([facility] )  Discussed lifestyle changes  Essential hypertension  - Controlled  - Blood pressure goal per J[facility] would be <150/90  - On  Hydrochlorothiazide 12 5   - Restrict daily salt intake up to 2 4 g  - Advised to walk 30 minutes a day 5 times a week and practice stress relieving measures      Dyslipidemia   Well controlled on simvastatin 10  COPD (chronic obstructive pulmonary disease) ([facility] )  Does not qualify for lung cancer screening  Quit smoking 9 years ago and has a 20PY  COPD well controlled on Symbicort and alb PRN  Educated about COPD triggers including smoking  Educated about the importance of medication compliance  Educated that the frequent use of the recuse inhaler indicates uncontrolled COPD and  needs to follow with PCP for optimization of therapy        History of CVA (cerebrovascular accident)    Continue aspirin and statin  Diagnoses and all orders for this visit:    Screening for colon cancer    Osteopenia of left hip    Obesity, morbid ([facility] )    Essential hypertension  -     Cancel: Basic metabolic panel; Future  -     Basic metabolic panel; Future    Dyslipidemia  -     Cancel: Lipid panel; Future  -     Lipid panel; Future    Chronic obstructive pulmonary disease, unspecified COPD type ([facility] )    History of CVA (cerebrovascular accident)    Mass of left lower leg  -     US MSK limited; Future    Other orders  -     Cancel: Ambulatory referral to Gastroenterology; Future        Patient Instructions   Increase melatonin to 10mg and add valarian root  Subjective:   Chronic conditions checkup     Patient ID: Checo Syed is a 76 y o  female  HPI  Concerned about a growth of the left pretibial area  It started about 3 months ago  Non tender  Also waking up frequently with foot cramping  Completed mammograms  Completed paps    Never had a colonoscopy and doesn't want one  Doesn't want screening and understands the risks  Dexa showed osteopenia and she is taking ca and vit d  Repeat in 2 years  The following portions of the patient's history were reviewed and updated as appropriate:   She   Patient Active Problem List    Diagnosis Date Noted    Obesity, morbid ([facility] ) 12/01/2020    Osteopenia 10/21/2020    Dizziness 07/17/2020    Primary osteoarthritis of left hand 04/15/2020    Muscle cramps 11/06/2019    Dyslipidemia 12/03/2018    COPD (chronic obstructive pulmonary disease) ([facility] ) 12/03/2018    History of CVA (cerebrovascular accident) 12/03/2018    Essential hypertension 12/03/2018     She  has a past surgical history that includes Hysterectomy (2011)  Her family history includes Cancer in her sister; Diabetes in her family; Hyperlipidemia in her mother; Hypertension in her father  She  reports that she quit smoking about 10 years ago  Her smoking use included cigarettes  She has a 20 00 pack-year smoking history  She has never used smokeless tobacco  She reports that she does not drink alcohol or use drugs  Current Outpatient Medications   Medication Sig Dispense Refill    albuterol (ProAir HFA) 90 mcg/act inhaler Inhale 2 puffs every 6 (six) hours as needed for wheezing or shortness of breath 1 Inhaler 5    aspirin (ECOTRIN LOW STRENGTH) 81 mg EC tablet Take 1 tablet (81 mg total) by mouth daily 90 tablet 1    Blood Pressure Monitoring (BLOOD PRESSURE CUFF) MISC by Does not apply route daily 1 each 0    budesonide-formoterol (SYMBICORT) 160-4 5 mcg/act inhaler Inhale 2 puffs 2 (two) times a day Rinse mouth after use   3 Inhaler 1    fluticasone (FLONASE) 50 mcg/act nasal spray 1 spray into each nostril daily 1 Bottle 2    hydrochlorothiazide (HYDRODIURIL) 12 5 mg tablet Take 1 tablet (12 5 mg total) by mouth daily 90 tablet 1    meclizine (ANTIVERT) 12 5 MG tablet Take 1 tablet (12 5 mg total) by mouth every 12 (twelve) hours as needed for dizziness 30 tablet 0    Melatonin 5 MG TABS Take 1 tablet (5 mg total) by mouth daily at bedtime as needed (sleep issue) 90 tablet 0    simvastatin (ZOCOR) 10 mg tablet Take 1 tablet (10 mg total) by mouth daily at bedtime 90 tablet 1    Oyster Shell 500 MG TABS Take 2 tablets (1,000 mg total) by mouth daily 180 tablet 0     No current facility-administered medications for this visit       Review of Systems   Constitutional: Negative for fever and unexpected weight change  HENT: Negative for ear pain, sore throat and trouble swallowing  Eyes: Negative for pain and visual disturbance  Respiratory: Negative for cough, chest tightness, shortness of breath and wheezing  Cardiovascular: Negative for chest pain  Gastrointestinal: Negative for abdominal distention, abdominal pain, blood in stool, constipation, diarrhea, nausea and vomiting  Endocrine: Negative for polydipsia and polyuria  Genitourinary: Negative for dysuria and hematuria  Musculoskeletal: Negative for back pain and myalgias  Skin: Negative for rash  Neurological: Negative for syncope and headaches  Psychiatric/Behavioral: Negative for suicidal ideas  PHQ-9 Depression Screening    PHQ-9:   Frequency of the following problems over the past two weeks:               Objective:      /68 (BP Location: Left arm, Patient Position: Sitting, Cuff Size: Adult)   Pulse 74   Temp 97 9 °F (36 6 °C) (Tympanic)   Resp 18   Ht 4' 11" (1 499 m)   Wt 79 8 kg (176 lb)   SpO2 99%   BMI 35 55 kg/m²          Physical Exam  Constitutional:       Appearance: She is well-developed  HENT:      Head: Normocephalic and atraumatic  Right Ear: External ear normal       Left Ear: External ear normal       Mouth/Throat:      Pharynx: No oropharyngeal exudate  Eyes:      General: No scleral icterus  Conjunctiva/sclera: Conjunctivae normal       Pupils: Pupils are equal, round, and reactive to light  Neck:      Musculoskeletal: Normal range of motion and neck supple  Cardiovascular:      Rate and Rhythm: Normal rate and regular rhythm  Heart sounds: No murmur  No friction rub  No gallop  Pulmonary:      Effort: Pulmonary effort is normal  No respiratory distress  Breath sounds: Normal breath sounds  No wheezing or rales  Abdominal:      General: Bowel sounds are normal  There is no distension  Palpations: Abdomen is soft  There is no mass  Tenderness: There is no abdominal tenderness  There is no rebound  Musculoskeletal: Normal range of motion  Comments: Soft spongiform mass left pretibial area    Skin:     General: Skin is warm and dry  Neurological:      Mental Status: She is alert and oriented to person, place, and time

## 2021-04-28 NOTE — ASSESSMENT & PLAN NOTE
- Controlled  - Blood pressure goal per [facility] would be <150/90  - On  Hydrochlorothiazide 12 5   - Restrict daily salt intake up to 2 4 g  - Advised to walk 30 minutes a day 5 times a week and practice stress relieving measures

## 2021-05-17 ENCOUNTER — TRANSCRIBE ORDERS (OUTPATIENT)
Dept: ADMINISTRATIVE | Facility: HOSPITAL | Age: 76
End: 2021-05-17

## 2021-05-17 DIAGNOSIS — R22.40 ANKLE MASS, UNSPECIFIED LATERALITY: Primary | ICD-10-CM

## 2021-05-21 ENCOUNTER — LAB (OUTPATIENT)
Dept: LAB | Facility: HOSPITAL | Age: 76
End: 2021-05-21
Payer: COMMERCIAL

## 2021-05-21 ENCOUNTER — HOSPITAL ENCOUNTER (OUTPATIENT)
Dept: RADIOLOGY | Facility: HOSPITAL | Age: 76
Discharge: HOME | End: 2021-05-21
Payer: COMMERCIAL

## 2021-05-21 DIAGNOSIS — I10 ESSENTIAL HYPERTENSION: ICD-10-CM

## 2021-05-21 DIAGNOSIS — R22.40 ANKLE MASS, UNSPECIFIED LATERALITY: ICD-10-CM

## 2021-05-21 DIAGNOSIS — E78.5 DYSLIPIDEMIA: ICD-10-CM

## 2021-05-21 LAB
ANION GAP SERPL CALCULATED.3IONS-SCNC: 3 MMOL/L (ref 4–13)
BUN SERPL-MCNC: 18 MG/DL (ref 5–25)
CALCIUM SERPL-MCNC: 9.3 MG/DL (ref 8.3–10.1)
CHLORIDE SERPL-SCNC: 108 MMOL/L (ref 100–108)
CHOLEST SERPL-MCNC: 143 MG/DL (ref 50–200)
CO2 SERPL-SCNC: 30 MMOL/L (ref 21–32)
CREAT SERPL-MCNC: 0.68 MG/DL (ref 0.6–1.3)
GFR SERPL CREATININE-BSD FRML MDRD: 86 ML/MIN/1.73SQ M
GLUCOSE P FAST SERPL-MCNC: 97 MG/DL (ref 65–99)
HDLC SERPL-MCNC: 61 MG/DL
LDLC SERPL CALC-MCNC: 63 MG/DL (ref 0–100)
NONHDLC SERPL-MCNC: 82 MG/DL
POTASSIUM SERPL-SCNC: 4.1 MMOL/L (ref 3.5–5.3)
SODIUM SERPL-SCNC: 141 MMOL/L (ref 136–145)
TRIGL SERPL-MCNC: 93 MG/DL

## 2021-05-21 PROCEDURE — 36415 COLL VENOUS BLD VENIPUNCTURE: CPT

## 2021-05-21 PROCEDURE — 80048 BASIC METABOLIC PNL TOTAL CA: CPT

## 2021-05-21 PROCEDURE — 80061 LIPID PANEL: CPT

## 2021-05-21 PROCEDURE — 76882 US LMTD JT/FCL EVL NVASC XTR: CPT

## 2021-05-24 DIAGNOSIS — E78.5 DYSLIPIDEMIA: ICD-10-CM

## 2021-05-24 DIAGNOSIS — Z86.73 HISTORY OF CVA (CEREBROVASCULAR ACCIDENT): ICD-10-CM

## 2021-05-24 DIAGNOSIS — I10 ESSENTIAL HYPERTENSION: ICD-10-CM

## 2021-05-24 DIAGNOSIS — J44.9 CHRONIC OBSTRUCTIVE PULMONARY DISEASE, UNSPECIFIED COPD TYPE (HCC): ICD-10-CM

## 2021-05-24 RX ORDER — HYDROCHLOROTHIAZIDE 12.5 MG/1
12.5 TABLET ORAL DAILY
Qty: 90 TABLET | Refills: 1 | Status: SHIPPED | OUTPATIENT
Start: 2021-05-24 | End: 2021-08-17 | Stop reason: SDUPTHER

## 2021-05-24 RX ORDER — SIMVASTATIN 10 MG
10 TABLET ORAL
Qty: 90 TABLET | Refills: 1 | Status: SHIPPED | OUTPATIENT
Start: 2021-05-24 | End: 2021-08-17 | Stop reason: SDUPTHER

## 2021-05-24 RX ORDER — BUDESONIDE AND FORMOTEROL FUMARATE DIHYDRATE 160; 4.5 UG/1; UG/1
2 AEROSOL RESPIRATORY (INHALATION) 2 TIMES DAILY
Qty: 1 G | Refills: 2 | Status: SHIPPED | OUTPATIENT
Start: 2021-05-24 | End: 2021-08-17 | Stop reason: SDUPTHER

## 2021-06-01 NOTE — RESULT ENCOUNTER NOTE
Please call patient with normal results  There is a benign fat grown on the lower leg  No need to intervene

## 2021-08-17 ENCOUNTER — OFFICE VISIT (OUTPATIENT)
Dept: FAMILY MEDICINE CLINIC | Facility: CLINIC | Age: 76
End: 2021-08-17
Payer: COMMERCIAL

## 2021-08-17 VITALS
SYSTOLIC BLOOD PRESSURE: 140 MMHG | TEMPERATURE: 99 F | RESPIRATION RATE: 16 BRPM | WEIGHT: 181.6 LBS | HEART RATE: 73 BPM | HEIGHT: 59 IN | DIASTOLIC BLOOD PRESSURE: 80 MMHG | BODY MASS INDEX: 36.61 KG/M2

## 2021-08-17 DIAGNOSIS — J44.9 CHRONIC OBSTRUCTIVE PULMONARY DISEASE, UNSPECIFIED COPD TYPE (HCC): ICD-10-CM

## 2021-08-17 DIAGNOSIS — Z12.39 ENCOUNTER FOR SCREENING FOR MALIGNANT NEOPLASM OF BREAST, UNSPECIFIED SCREENING MODALITY: ICD-10-CM

## 2021-08-17 DIAGNOSIS — E78.5 DYSLIPIDEMIA: ICD-10-CM

## 2021-08-17 DIAGNOSIS — Z86.73 HISTORY OF CVA (CEREBROVASCULAR ACCIDENT): ICD-10-CM

## 2021-08-17 DIAGNOSIS — M79.89 LEFT LEG SWELLING: Primary | ICD-10-CM

## 2021-08-17 DIAGNOSIS — I10 ESSENTIAL HYPERTENSION: ICD-10-CM

## 2021-08-17 PROBLEM — E66.9 OBESITY (BMI 30-39.9): Status: ACTIVE | Noted: 2020-12-01

## 2021-08-17 PROCEDURE — 99214 OFFICE O/P EST MOD 30 MIN: CPT | Performed by: FAMILY MEDICINE

## 2021-08-17 RX ORDER — ALBUTEROL SULFATE 90 UG/1
2 AEROSOL, METERED RESPIRATORY (INHALATION) EVERY 6 HOURS PRN
Qty: 18 G | Refills: 5 | Status: SHIPPED | OUTPATIENT
Start: 2021-08-17

## 2021-08-17 RX ORDER — ASPIRIN 81 MG/1
81 TABLET ORAL DAILY
Qty: 90 TABLET | Refills: 1 | Status: SHIPPED | OUTPATIENT
Start: 2021-08-17 | End: 2021-11-04 | Stop reason: SDUPTHER

## 2021-08-17 RX ORDER — HYDROCHLOROTHIAZIDE 12.5 MG/1
12.5 TABLET ORAL DAILY
Qty: 90 TABLET | Refills: 1 | Status: SHIPPED | OUTPATIENT
Start: 2021-08-17 | End: 2021-11-04 | Stop reason: SDUPTHER

## 2021-08-17 RX ORDER — BUDESONIDE AND FORMOTEROL FUMARATE DIHYDRATE 160; 4.5 UG/1; UG/1
2 AEROSOL RESPIRATORY (INHALATION) 2 TIMES DAILY
Qty: 1 G | Refills: 2 | Status: SHIPPED | OUTPATIENT
Start: 2021-08-17 | End: 2021-08-19

## 2021-08-17 RX ORDER — SIMVASTATIN 10 MG
10 TABLET ORAL
Qty: 90 TABLET | Refills: 1 | Status: SHIPPED | OUTPATIENT
Start: 2021-08-17 | End: 2021-11-04 | Stop reason: SDUPTHER

## 2021-08-17 RX ORDER — OLOPATADINE HYDROCHLORIDE 2 MG/ML
SOLUTION/ DROPS OPHTHALMIC
COMMUNITY
Start: 2021-07-01 | End: 2022-05-12 | Stop reason: SDUPTHER

## 2021-08-17 NOTE — PROGRESS NOTES
Assessment/Plan:    No problem-specific Assessment & Plan notes found for this encounter  Diagnoses and all orders for this visit:    Left leg swelling  -     VAS lower limb venous duplex study, complete bilateral; Future    Encounter for screening for malignant neoplasm of breast, unspecified screening modality    Essential hypertension  -     aspirin (ECOTRIN LOW STRENGTH) 81 mg EC tablet; Take 1 tablet (81 mg total) by mouth daily  -     hydrochlorothiazide (HYDRODIURIL) 12 5 mg tablet; Take 1 tablet (12 5 mg total) by mouth daily    Dyslipidemia  -     aspirin (ECOTRIN LOW STRENGTH) 81 mg EC tablet; Take 1 tablet (81 mg total) by mouth daily  -     simvastatin (ZOCOR) 10 mg tablet; Take 1 tablet (10 mg total) by mouth daily at bedtime    History of CVA (cerebrovascular accident)  -     aspirin (ECOTRIN LOW STRENGTH) 81 mg EC tablet; Take 1 tablet (81 mg total) by mouth daily  -     simvastatin (ZOCOR) 10 mg tablet; Take 1 tablet (10 mg total) by mouth daily at bedtime    Chronic obstructive pulmonary disease, unspecified COPD type ([facility])  -     albuterol (ProAir HFA) 90 mcg/act inhaler; Inhale 2 puffs every 6 (six) hours as needed for wheezing or shortness of breath  -     budesonide-formoterol (SYMBICORT) 160-4 5 mcg/act inhaler; Inhale 2 puffs 2 (two) times a day Rinse mouth after use  Other orders  -     Cancel: Mammo screening bilateral w 3d & cad; Future  -     olopatadine HCl (PATADAY) 0 2 % opth drops      Unilateral left LE swelling  Likely venous insufficiency  Has venous stasis changes  Pt also has right mild basilar crackles  She reports she was told about this abn in the past  No documentation of this in prior notes  Denies orthopnea or shortness of breath  Will rule out DVT with stat doppler  If neg, pt ill start compression stockings and leg elevation  Pt will call office asap if experiencing sob       Subjective: leg swelling   Patient ID: Derik Del Rosario is a 68 y o  female with a ho htn, cva, COPD, osteopenia, obesity    HPI  Here for leg swelling of the left leg that started 4 weeks ago  There is also mild swelling in the right ankle  The swelling is not present in the morning and gets worse as the day goes on  Denies pain, shortness of breath, chest pain  She does not have any history lower extremity swelling  The following portions of the patient's history were reviewed and updated as appropriate: allergies, current medications, past family history, past medical history, past social history, past surgical history and problem list     Review of Systems   Constitutional: Negative for fever and unexpected weight change  HENT: Negative for ear pain, sore throat and trouble swallowing  Eyes: Negative for pain and visual disturbance  Respiratory: Negative for cough, chest tightness, shortness of breath and wheezing  Cardiovascular: Positive for leg swelling  Negative for chest pain  Gastrointestinal: Negative for abdominal distention, abdominal pain, blood in stool, constipation, diarrhea, nausea and vomiting  Endocrine: Negative for polydipsia and polyuria  Genitourinary: Negative for dysuria and hematuria  Musculoskeletal: Negative for back pain and myalgias  Skin: Negative for rash  Neurological: Negative for syncope and headaches  Psychiatric/Behavioral: Negative for suicidal ideas  PHQ-9 Depression Screening    PHQ-9:   Frequency of the following problems over the past two weeks:             Objective:      /80 (BP Location: Left arm, Patient Position: Sitting, Cuff Size: Large)   Pulse 73   Temp 99 °F (37 2 °C) (Tympanic)   Resp 16   Ht 4' 11" (1 499 m)   Wt 82 4 kg (181 lb 9 6 oz)   BMI 36 68 kg/m²          Physical Exam  Constitutional:       Appearance: She is well-developed  HENT:      Head: Normocephalic and atraumatic        Right Ear: External ear normal       Left Ear: External ear normal       Mouth/Throat:      Pharynx: No oropharyngeal exudate  Eyes:      General: No scleral icterus  Conjunctiva/sclera: Conjunctivae normal       Pupils: Pupils are equal, round, and reactive to light  Cardiovascular:      Rate and Rhythm: Normal rate and regular rhythm  Heart sounds: No murmur heard  No friction rub  No gallop  Pulmonary:      Effort: Pulmonary effort is normal  No respiratory distress  Breath sounds: Rales (right basliar area ) present  No wheezing  Abdominal:      General: Bowel sounds are normal  There is no distension  Palpations: Abdomen is soft  There is no mass  Tenderness: There is no abdominal tenderness  There is no rebound  Musculoskeletal:         General: Normal range of motion  Cervical back: Normal range of motion and neck supple  Right lower leg: Edema (trace, tretibial) present  Left lower leg: Edema (1+ pitting up to the mid shin, 2+ pitting on the dorsal surface of the foot  ) present  Skin:     General: Skin is warm and dry  Comments: Bilateral venous stasis changes of the tibial areas worse on the left  Neurological:      Mental Status: She is alert and oriented to person, place, and time

## 2021-08-19 ENCOUNTER — HOSPITAL ENCOUNTER (OUTPATIENT)
Dept: NON INVASIVE DIAGNOSTICS | Facility: CLINIC | Age: 76
Discharge: HOME | End: 2021-08-19
Payer: COMMERCIAL

## 2021-08-19 DIAGNOSIS — M79.89 LEFT LEG SWELLING: ICD-10-CM

## 2021-08-19 DIAGNOSIS — J44.9 CHRONIC OBSTRUCTIVE PULMONARY DISEASE, UNSPECIFIED COPD TYPE (HCC): ICD-10-CM

## 2021-08-19 PROCEDURE — 93971 EXTREMITY STUDY: CPT | Performed by: SURGERY

## 2021-08-19 PROCEDURE — 93971 EXTREMITY STUDY: CPT

## 2021-08-19 RX ORDER — BUDESONIDE AND FORMOTEROL FUMARATE DIHYDRATE 160; 4.5 UG/1; UG/1
AEROSOL RESPIRATORY (INHALATION)
Qty: 10.2 G | Refills: 0 | Status: SHIPPED | OUTPATIENT
Start: 2021-08-19 | End: 2021-11-04 | Stop reason: SDUPTHER

## 2021-08-19 NOTE — RESULT ENCOUNTER NOTE
Please call patient with normal results  Ultrasound negative for DVT  Start compression stockings and frequent leg elevations

## 2021-10-15 ENCOUNTER — TELEPHONE (OUTPATIENT)
Dept: FAMILY MEDICINE CLINIC | Facility: CLINIC | Age: 76
End: 2021-10-15

## 2021-10-15 DIAGNOSIS — E78.5 DYSLIPIDEMIA: ICD-10-CM

## 2021-10-15 DIAGNOSIS — E55.9 VITAMIN D INSUFFICIENCY: ICD-10-CM

## 2021-10-15 DIAGNOSIS — I10 ESSENTIAL HYPERTENSION: ICD-10-CM

## 2021-10-15 DIAGNOSIS — M85.852 OSTEOPENIA OF LEFT HIP: Primary | ICD-10-CM

## 2021-10-26 ENCOUNTER — APPOINTMENT (OUTPATIENT)
Dept: LAB | Facility: HOSPITAL | Age: 76
End: 2021-10-26
Payer: COMMERCIAL

## 2021-10-26 DIAGNOSIS — I10 ESSENTIAL HYPERTENSION: ICD-10-CM

## 2021-10-26 DIAGNOSIS — M85.852 OSTEOPENIA OF LEFT HIP: ICD-10-CM

## 2021-10-26 DIAGNOSIS — E55.9 VITAMIN D INSUFFICIENCY: ICD-10-CM

## 2021-10-26 DIAGNOSIS — E78.5 DYSLIPIDEMIA: ICD-10-CM

## 2021-10-26 LAB
25(OH)D3 SERPL-MCNC: 40.6 NG/ML (ref 30–100)
ALBUMIN SERPL BCP-MCNC: 3.4 G/DL (ref 3.5–5)
ALP SERPL-CCNC: 97 U/L (ref 46–116)
ALT SERPL W P-5'-P-CCNC: 25 U/L (ref 12–78)
ANION GAP SERPL CALCULATED.3IONS-SCNC: 4 MMOL/L (ref 4–13)
AST SERPL W P-5'-P-CCNC: 30 U/L (ref 5–45)
BILIRUB SERPL-MCNC: 0.6 MG/DL (ref 0.2–1)
BUN SERPL-MCNC: 18 MG/DL (ref 5–25)
CALCIUM ALBUM COR SERPL-MCNC: 10.2 MG/DL (ref 8.3–10.1)
CALCIUM SERPL-MCNC: 9.7 MG/DL (ref 8.3–10.1)
CHLORIDE SERPL-SCNC: 105 MMOL/L (ref 100–108)
CHOLEST SERPL-MCNC: 161 MG/DL (ref 50–200)
CO2 SERPL-SCNC: 31 MMOL/L (ref 21–32)
CREAT SERPL-MCNC: 0.74 MG/DL (ref 0.6–1.3)
GFR SERPL CREATININE-BSD FRML MDRD: 79 ML/MIN/1.73SQ M
GLUCOSE P FAST SERPL-MCNC: 106 MG/DL (ref 65–99)
HDLC SERPL-MCNC: 67 MG/DL
LDLC SERPL CALC-MCNC: 78 MG/DL (ref 0–100)
NONHDLC SERPL-MCNC: 94 MG/DL
POTASSIUM SERPL-SCNC: 3.7 MMOL/L (ref 3.5–5.3)
PROT SERPL-MCNC: 7.5 G/DL (ref 6.4–8.2)
SODIUM SERPL-SCNC: 140 MMOL/L (ref 136–145)
TRIGL SERPL-MCNC: 80 MG/DL

## 2021-10-26 PROCEDURE — 82306 VITAMIN D 25 HYDROXY: CPT

## 2021-10-26 PROCEDURE — 80061 LIPID PANEL: CPT

## 2021-10-26 PROCEDURE — 36415 COLL VENOUS BLD VENIPUNCTURE: CPT

## 2021-10-26 PROCEDURE — 80053 COMPREHEN METABOLIC PANEL: CPT

## 2021-11-04 ENCOUNTER — OFFICE VISIT (OUTPATIENT)
Dept: FAMILY MEDICINE CLINIC | Facility: CLINIC | Age: 76
End: 2021-11-04
Payer: COMMERCIAL

## 2021-11-04 VITALS
HEART RATE: 87 BPM | BODY MASS INDEX: 36.21 KG/M2 | WEIGHT: 179.6 LBS | TEMPERATURE: 97.7 F | RESPIRATION RATE: 16 BRPM | DIASTOLIC BLOOD PRESSURE: 80 MMHG | HEIGHT: 59 IN | SYSTOLIC BLOOD PRESSURE: 144 MMHG | OXYGEN SATURATION: 96 %

## 2021-11-04 DIAGNOSIS — M85.852 OSTEOPENIA OF LEFT HIP: ICD-10-CM

## 2021-11-04 DIAGNOSIS — E78.5 DYSLIPIDEMIA: ICD-10-CM

## 2021-11-04 DIAGNOSIS — I10 ESSENTIAL HYPERTENSION: ICD-10-CM

## 2021-11-04 DIAGNOSIS — Z12.39 ENCOUNTER FOR SCREENING FOR MALIGNANT NEOPLASM OF BREAST, UNSPECIFIED SCREENING MODALITY: Primary | ICD-10-CM

## 2021-11-04 DIAGNOSIS — Z23 ENCOUNTER FOR VACCINATION: ICD-10-CM

## 2021-11-04 DIAGNOSIS — R42 VERTIGO: ICD-10-CM

## 2021-11-04 DIAGNOSIS — R73.01 IMPAIRED FASTING BLOOD SUGAR: ICD-10-CM

## 2021-11-04 DIAGNOSIS — Z86.73 HISTORY OF CVA (CEREBROVASCULAR ACCIDENT): ICD-10-CM

## 2021-11-04 DIAGNOSIS — J44.9 CHRONIC OBSTRUCTIVE PULMONARY DISEASE, UNSPECIFIED COPD TYPE (HCC): ICD-10-CM

## 2021-11-04 PROBLEM — R25.2 MUSCLE CRAMPS: Status: RESOLVED | Noted: 2019-11-06 | Resolved: 2021-11-04

## 2021-11-04 PROCEDURE — 90662 IIV NO PRSV INCREASED AG IM: CPT

## 2021-11-04 PROCEDURE — 99214 OFFICE O/P EST MOD 30 MIN: CPT | Performed by: FAMILY MEDICINE

## 2021-11-04 PROCEDURE — G0008 ADMIN INFLUENZA VIRUS VAC: HCPCS

## 2021-11-04 RX ORDER — BUDESONIDE AND FORMOTEROL FUMARATE DIHYDRATE 160; 4.5 UG/1; UG/1
2 AEROSOL RESPIRATORY (INHALATION) 2 TIMES DAILY
Qty: 10.2 G | Refills: 5 | Status: SHIPPED | OUTPATIENT
Start: 2021-11-04 | End: 2021-11-11 | Stop reason: SDUPTHER

## 2021-11-04 RX ORDER — HYDROCHLOROTHIAZIDE 12.5 MG/1
12.5 TABLET ORAL DAILY
Qty: 90 TABLET | Refills: 1 | Status: SHIPPED | OUTPATIENT
Start: 2021-11-04 | End: 2022-04-28

## 2021-11-04 RX ORDER — SIMVASTATIN 10 MG
10 TABLET ORAL
Qty: 90 TABLET | Refills: 1 | Status: SHIPPED | OUTPATIENT
Start: 2021-11-04 | End: 2022-04-28

## 2021-11-04 RX ORDER — ASPIRIN 81 MG/1
81 TABLET ORAL DAILY
Qty: 90 TABLET | Refills: 1 | Status: SHIPPED | OUTPATIENT
Start: 2021-11-04

## 2021-11-04 RX ORDER — MECLIZINE HCL 12.5 MG/1
12.5 TABLET ORAL EVERY 12 HOURS PRN
Qty: 30 TABLET | Refills: 0 | Status: SHIPPED | OUTPATIENT
Start: 2021-11-04 | End: 2022-05-12 | Stop reason: SDUPTHER

## 2021-11-10 DIAGNOSIS — J44.9 CHRONIC OBSTRUCTIVE PULMONARY DISEASE, UNSPECIFIED COPD TYPE (HCC): ICD-10-CM

## 2021-11-11 RX ORDER — BUDESONIDE AND FORMOTEROL FUMARATE DIHYDRATE 160; 4.5 UG/1; UG/1
2 AEROSOL RESPIRATORY (INHALATION) 2 TIMES DAILY
Qty: 30.6 G | Refills: 5 | Status: SHIPPED | OUTPATIENT
Start: 2021-11-11

## 2022-04-14 ENCOUNTER — APPOINTMENT (OUTPATIENT)
Dept: LAB | Facility: HOSPITAL | Age: 77
End: 2022-04-14
Payer: COMMERCIAL

## 2022-04-14 DIAGNOSIS — I10 ESSENTIAL HYPERTENSION: ICD-10-CM

## 2022-04-14 DIAGNOSIS — R73.01 IMPAIRED FASTING BLOOD SUGAR: ICD-10-CM

## 2022-04-14 DIAGNOSIS — E78.5 DYSLIPIDEMIA: ICD-10-CM

## 2022-04-14 LAB
ANION GAP SERPL CALCULATED.3IONS-SCNC: 0 MMOL/L (ref 4–13)
BUN SERPL-MCNC: 20 MG/DL (ref 5–25)
CALCIUM SERPL-MCNC: 9.3 MG/DL (ref 8.3–10.1)
CHLORIDE SERPL-SCNC: 106 MMOL/L (ref 100–108)
CHOLEST SERPL-MCNC: 133 MG/DL
CO2 SERPL-SCNC: 32 MMOL/L (ref 21–32)
CREAT SERPL-MCNC: 0.9 MG/DL (ref 0.6–1.3)
EST. AVERAGE GLUCOSE BLD GHB EST-MCNC: 126 MG/DL
GFR SERPL CREATININE-BSD FRML MDRD: 62 ML/MIN/1.73SQ M
GLUCOSE P FAST SERPL-MCNC: 87 MG/DL (ref 65–99)
HBA1C MFR BLD: 6 %
HDLC SERPL-MCNC: 60 MG/DL
LDLC SERPL CALC-MCNC: 56 MG/DL (ref 0–100)
NONHDLC SERPL-MCNC: 73 MG/DL
POTASSIUM SERPL-SCNC: 4.4 MMOL/L (ref 3.5–5.3)
SODIUM SERPL-SCNC: 138 MMOL/L (ref 136–145)
TRIGL SERPL-MCNC: 84 MG/DL

## 2022-04-14 PROCEDURE — 83036 HEMOGLOBIN GLYCOSYLATED A1C: CPT

## 2022-04-14 PROCEDURE — 36415 COLL VENOUS BLD VENIPUNCTURE: CPT

## 2022-04-14 PROCEDURE — 80061 LIPID PANEL: CPT

## 2022-04-14 PROCEDURE — 80048 BASIC METABOLIC PNL TOTAL CA: CPT

## 2022-04-28 DIAGNOSIS — I10 ESSENTIAL HYPERTENSION: ICD-10-CM

## 2022-04-28 DIAGNOSIS — Z86.73 HISTORY OF CVA (CEREBROVASCULAR ACCIDENT): ICD-10-CM

## 2022-04-28 DIAGNOSIS — E78.5 DYSLIPIDEMIA: ICD-10-CM

## 2022-04-28 RX ORDER — HYDROCHLOROTHIAZIDE 12.5 MG/1
TABLET ORAL
Qty: 90 TABLET | Refills: 0 | Status: SHIPPED | OUTPATIENT
Start: 2022-04-28 | End: 2022-05-12 | Stop reason: SDUPTHER

## 2022-04-28 RX ORDER — SIMVASTATIN 10 MG
10 TABLET ORAL
Qty: 90 TABLET | Refills: 0 | Status: SHIPPED | OUTPATIENT
Start: 2022-04-28 | End: 2022-05-12 | Stop reason: SDUPTHER

## 2022-05-12 ENCOUNTER — OFFICE VISIT (OUTPATIENT)
Dept: FAMILY MEDICINE CLINIC | Facility: CLINIC | Age: 77
End: 2022-05-12
Payer: COMMERCIAL

## 2022-05-12 VITALS
HEIGHT: 59 IN | HEART RATE: 76 BPM | OXYGEN SATURATION: 97 % | DIASTOLIC BLOOD PRESSURE: 80 MMHG | TEMPERATURE: 98.8 F | WEIGHT: 178.8 LBS | SYSTOLIC BLOOD PRESSURE: 130 MMHG | RESPIRATION RATE: 16 BRPM | BODY MASS INDEX: 36.04 KG/M2

## 2022-05-12 DIAGNOSIS — J44.9 CHRONIC OBSTRUCTIVE PULMONARY DISEASE, UNSPECIFIED COPD TYPE (HCC): ICD-10-CM

## 2022-05-12 DIAGNOSIS — E66.01 OBESITY, MORBID (HCC): ICD-10-CM

## 2022-05-12 DIAGNOSIS — R73.03 PREDIABETES: ICD-10-CM

## 2022-05-12 DIAGNOSIS — R42 VERTIGO: ICD-10-CM

## 2022-05-12 DIAGNOSIS — M85.852 OSTEOPENIA OF LEFT HIP: ICD-10-CM

## 2022-05-12 DIAGNOSIS — J30.89 SEASONAL ALLERGIC RHINITIS DUE TO OTHER ALLERGIC TRIGGER: ICD-10-CM

## 2022-05-12 DIAGNOSIS — Z00.00 ENCOUNTER FOR ANNUAL WELLNESS EXAM IN MEDICARE PATIENT: Primary | ICD-10-CM

## 2022-05-12 DIAGNOSIS — J30.9 ALLERGIC RHINITIS, UNSPECIFIED SEASONALITY, UNSPECIFIED TRIGGER: ICD-10-CM

## 2022-05-12 DIAGNOSIS — Z78.0 POST-MENOPAUSAL: ICD-10-CM

## 2022-05-12 DIAGNOSIS — I10 ESSENTIAL HYPERTENSION: ICD-10-CM

## 2022-05-12 DIAGNOSIS — R03.0 ELEVATED BLOOD PRESSURE READING IN OFFICE WITHOUT DIAGNOSIS OF HYPERTENSION: ICD-10-CM

## 2022-05-12 DIAGNOSIS — Z86.73 HISTORY OF CVA (CEREBROVASCULAR ACCIDENT): ICD-10-CM

## 2022-05-12 DIAGNOSIS — E78.5 DYSLIPIDEMIA: ICD-10-CM

## 2022-05-12 PROCEDURE — 99214 OFFICE O/P EST MOD 30 MIN: CPT | Performed by: FAMILY MEDICINE

## 2022-05-12 PROCEDURE — G0439 PPPS, SUBSEQ VISIT: HCPCS | Performed by: FAMILY MEDICINE

## 2022-05-12 RX ORDER — MECLIZINE HCL 12.5 MG/1
12.5 TABLET ORAL EVERY 12 HOURS PRN
Qty: 30 TABLET | Refills: 2 | Status: SHIPPED | OUTPATIENT
Start: 2022-05-12

## 2022-05-12 RX ORDER — HYDROCHLOROTHIAZIDE 12.5 MG/1
12.5 TABLET ORAL DAILY
Qty: 90 TABLET | Refills: 1 | Status: SHIPPED | OUTPATIENT
Start: 2022-05-12

## 2022-05-12 RX ORDER — FLUTICASONE PROPIONATE 50 MCG
1 SPRAY, SUSPENSION (ML) NASAL DAILY
Qty: 16 G | Refills: 5 | Status: SHIPPED | OUTPATIENT
Start: 2022-05-12

## 2022-05-12 RX ORDER — SIMVASTATIN 10 MG
10 TABLET ORAL
Qty: 90 TABLET | Refills: 1 | Status: SHIPPED | OUTPATIENT
Start: 2022-05-12

## 2022-05-12 RX ORDER — OLOPATADINE HYDROCHLORIDE 2 MG/ML
1 SOLUTION/ DROPS OPHTHALMIC 2 TIMES DAILY PRN
Qty: 2.5 ML | Refills: 5 | Status: SHIPPED | OUTPATIENT
Start: 2022-05-12

## 2022-05-12 RX ORDER — LORATADINE 10 MG/1
10 TABLET ORAL DAILY
Qty: 30 TABLET | Refills: 2 | Status: SHIPPED | OUTPATIENT
Start: 2022-05-12

## 2022-05-12 NOTE — ASSESSMENT & PLAN NOTE
Completed mammograms  Completed paps  Never had a colonoscopy and doesn't want one  Doesn't want screening and understands the risks  Dexa showed osteopenia and she is taking ca and vit d  Repeat q2 yrs

## 2022-05-12 NOTE — PATIENT INSTRUCTIONS
Medicare Preventive Visit Patient Instructions  Thank you for completing your Welcome to Medicare Visit or Medicare Annual Wellness Visit today  Your next wellness visit will be due in one year (5/13/2023)  The screening/preventive services that you may require over the next 5-10 years are detailed below  Some tests may not apply to you based off risk factors and/or age  Screening tests ordered at today's visit but not completed yet may show as past due  Also, please note that scanned in results may not display below  Preventive Screenings:  Service Recommendations Previous Testing/Comments   Colorectal Cancer Screening  * Colonoscopy    * Fecal Occult Blood Test (FOBT)/Fecal Immunochemical Test (FIT)  * Fecal DNA/Cologuard Test  * Flexible Sigmoidoscopy Age: 54-65 years old   Colonoscopy: every 10 years (may be performed more frequently if at higher risk)  OR  FOBT/FIT: every 1 year  OR  Cologuard: every 3 years  OR  Sigmoidoscopy: every 5 years  Screening may be recommended earlier than age 48 if at higher risk for colorectal cancer  Also, an individualized decision between you and your healthcare provider will decide whether screening between the ages of 74-80 would be appropriate  Colonoscopy: Not on file  FOBT/FIT: Not on file  Cologuard: Not on file  Sigmoidoscopy: Not on file          Breast Cancer Screening Age: 36 years old  Frequency: every 1-2 years  Not required if history of left and right mastectomy Mammogram: Not on file        Cervical Cancer Screening Between the ages of 21-29, pap smear recommended once every 3 years  Between the ages of 33-67, can perform pap smear with HPV co-testing every 5 years     Recommendations may differ for women with a history of total hysterectomy, cervical cancer, or abnormal pap smears in past  Pap Smear: Not on file    Screening Not Indicated   Hepatitis C Screening Once for adults born between [facility]  More frequently in patients at high risk for Hepatitis C Hep C Antibody: 12/03/2018    Screening Current   Diabetes Screening 1-2 times per year if you're at risk for diabetes or have pre-diabetes Fasting glucose: 87 mg/dL   A1C: 6 0 %    Screening Current   Cholesterol Screening Once every 5 years if you don't have a lipid disorder  May order more often based on risk factors  Lipid panel: 04/14/2022    Screening Current     Other Preventive Screenings Covered by Medicare:  1  Abdominal Aortic Aneurysm (AAA) Screening: covered once if your at risk  You're considered to be at risk if you have a family history of AAA  2  Lung Cancer Screening: covers low dose CT scan once per year if you meet all of the following conditions: (1) Age 50-69; (2) No signs or symptoms of lung cancer; (3) Current smoker or have quit smoking within the last 15 years; (4) You have a tobacco smoking history of at least 30 pack years (packs per day multiplied by number of years you smoked); (5) You get a written order from a healthcare provider  3  Glaucoma Screening: covered annually if you're considered high risk: (1) You have diabetes OR (2) Family history of glaucoma OR (3)  aged 48 and older OR (3)  American aged 72 and older  3  Osteoporosis Screening: covered every 2 years if you meet one of the following conditions: (1) You're estrogen deficient and at risk for osteoporosis based off medical history and other findings; (2) Have a vertebral abnormality; (3) On glucocorticoid therapy for more than 3 months; (4) Have primary hyperparathyroidism; (5) On osteoporosis medications and need to assess response to drug therapy  · Last bone density test (DXA Scan): 07/01/2020   5  HIV Screening: covered annually if you're between the age of 15-65  Also covered annually if you are younger than 13 and older than 72 with risk factors for HIV infection  For pregnant patients, it is covered up to 3 times per pregnancy      Immunizations:  Immunization Recommendations Influenza Vaccine Annual influenza vaccination during flu season is recommended for all persons aged >= 6 months who do not have contraindications   Pneumococcal Vaccine (Prevnar and Pneumovax)  * Prevnar = PCV13  * Pneumovax = PPSV23   Adults 25-60 years old: 1-3 doses may be recommended based on certain risk factors  Adults 72 years old: Prevnar (PCV13) vaccine recommended followed by Pneumovax (PPSV23) vaccine  If already received PPSV23 since turning 65, then PCV13 recommended at least one year after PPSV23 dose  Hepatitis B Vaccine 3 dose series if at intermediate or high risk (ex: diabetes, end stage renal disease, liver disease)   Tetanus (Td) Vaccine - COST NOT COVERED BY MEDICARE PART B Following completion of primary series, a booster dose should be given every 10 years to maintain immunity against tetanus  Td may also be given as tetanus wound prophylaxis  Tdap Vaccine - COST NOT COVERED BY MEDICARE PART B Recommended at least once for all adults  For pregnant patients, recommended with each pregnancy  Shingles Vaccine (Shingrix) - COST NOT COVERED BY MEDICARE PART B  2 shot series recommended in those aged 48 and above     Health Maintenance Due:      Topic Date Due    Hepatitis C Screening  Completed     Immunizations Due:      Topic Date Due    DTaP,Tdap,and Td Vaccines (1 - Tdap) Never done    Pneumococcal Vaccine: 65+ Years (2 - PCV) 10/23/2020    COVID-19 Vaccine (3 - Booster for Maximiliano Balder series) 10/13/2021     Advance Directives   What are advance directives? Advance directives are legal documents that state your wishes and plans for medical care  These plans are made ahead of time in case you lose your ability to make decisions for yourself  Advance directives can apply to any medical decision, such as the treatments you want, and if you want to donate organs  What are the types of advance directives?   There are many types of advance directives, and each state has rules about how to use them  You may choose a combination of any of the following:  · Living will: This is a written record of the treatment you want  You can also choose which treatments you do not want, which to limit, and which to stop at a certain time  This includes surgery, medicine, IV fluid, and tube feedings  · Durable power of  for healthcare [facility]): This is a written record that states who you want to make healthcare choices for you when you are unable to make them for yourself  This person, called a proxy, is usually a family member or a friend  You may choose more than 1 proxy  · Do not resuscitate (DNR) order:  A DNR order is used in case your heart stops beating or you stop breathing  It is a request not to have certain forms of treatment, such as CPR  A DNR order may be included in other types of advance directives  · Medical directive: This covers the care that you want if you are in a coma, near death, or unable to make decisions for yourself  You can list the treatments you want for each condition  Treatment may include pain medicine, surgery, blood transfusions, dialysis, IV or tube feedings, and a ventilator (breathing machine)  · Values history: This document has questions about your views, beliefs, and how you feel and think about life  This information can help others choose the care that you would choose  Why are advance directives important? An advance directive helps you control your care  Although spoken wishes may be used, it is better to have your wishes written down  Spoken wishes can be misunderstood, or not followed  Treatments may be given even if you do not want them  An advance directive may make it easier for your family to make difficult choices about your care  Weight Management   Why it is important to manage your weight:  Being overweight increases your risk of health conditions such as heart disease, high blood pressure, type 2 diabetes, and certain types of cancer   It can also increase your risk for osteoarthritis, sleep apnea, and other respiratory problems  Aim for a slow, steady weight loss  Even a small amount of weight loss can lower your risk of health problems  How to lose weight safely:  A safe and healthy way to lose weight is to eat fewer calories and get regular exercise  You can lose up about 1 pound a week by decreasing the number of calories you eat by 500 calories each day  Healthy meal plan for weight management:  A healthy meal plan includes a variety of foods, contains fewer calories, and helps you stay healthy  A healthy meal plan includes the following:  · Eat whole-grain foods more often  A healthy meal plan should contain fiber  Fiber is the part of grains, fruits, and vegetables that is not broken down by your body  Whole-grain foods are healthy and provide extra fiber in your diet  Some examples of whole-grain foods are whole-wheat breads and pastas, oatmeal, brown rice, and bulgur  · Eat a variety of vegetables every day  Include dark, leafy greens such as spinach, kale, isabell greens, and mustard greens  Eat yellow and orange vegetables such as carrots, sweet potatoes, and winter squash  · Eat a variety of fruits every day  Choose fresh or canned fruit (canned in its own juice or light syrup) instead of juice  Fruit juice has very little or no fiber  · Eat low-fat dairy foods  Drink fat-free (skim) milk or 1% milk  Eat fat-free yogurt and low-fat cottage cheese  Try low-fat cheeses such as mozzarella and other reduced-fat cheeses  · Choose meat and other protein foods that are low in fat  Choose beans or other legumes such as split peas or lentils  Choose fish, skinless poultry (chicken or turkey), or lean cuts of red meat (beef or pork)  Before you cook meat or poultry, cut off any visible fat  · Use less fat and oil  Try baking foods instead of frying them   Add less fat, such as margarine, sour cream, regular salad dressing and mayonnaise to foods  Eat fewer high-fat foods  Some examples of high-fat foods include french fries, doughnuts, ice cream, and cakes  · Eat fewer sweets  Limit foods and drinks that are high in sugar  This includes candy, cookies, regular soda, and sweetened drinks  Exercise:  Exercise at least 30 minutes per day on most days of the week  Some examples of exercise include walking, biking, dancing, and swimming  You can also fit in more physical activity by taking the stairs instead of the elevator or parking farther away from stores  Ask your healthcare provider about the best exercise plan for you  © Copyright [employer] 2018 Information is for End User's use only and may not be sold, redistributed or otherwise used for commercial purposes   All illustrations and images included in CareNotes® are the copyrighted property of A [employer] M , Inc  or [address] [place] [address]

## 2022-05-12 NOTE — PROGRESS NOTES
Assessment and Plan:     Problem List Items Addressed This Visit        Respiratory    COPD (chronic obstructive pulmonary disease) ([facility] )    Relevant Medications    loratadine (CLARITIN) 10 mg tablet    fluticasone (FLONASE) 50 mcg/act nasal spray       Cardiovascular and Mediastinum    Essential hypertension     - Controlled  - Blood pressure goal per JNC VIII would be <150/90  - On  Hydrochlorothiazide 12 5   - Restrict daily salt intake up to 2 4 g  - Advised to walk 30 minutes a day 5 times a week and practice stress relieving measures             Relevant Medications    hydrochlorothiazide (HYDRODIURIL) 12 5 mg tablet    Other Relevant Orders    Basic metabolic panel       Musculoskeletal and Integument    Osteopenia     Continue vit d  Recheck DEXA  Relevant Orders    DXA bone density spine hip and pelvis       Other    Dyslipidemia      Cholesterol levels at goal on simvastatin 10  Relevant Medications    simvastatin (ZOCOR) 10 mg tablet    Other Relevant Orders    Lipid panel    History of CVA (cerebrovascular accident)      On aspirin and statin  Relevant Medications    simvastatin (ZOCOR) 10 mg tablet    Prediabetes     Pt does not think the elevation is due to diet  She will try increasing activity  Relevant Orders    Hemoglobin A1C    Obesity, morbid ([facility] )    Encounter for annual wellness exam in Medicare patient - Primary     Completed mammograms  Completed paps  Never had a colonoscopy and doesn't want one  Doesn't want screening and understands the risks  Dexa showed osteopenia and she is taking ca and vit d  Repeat q2 yrs               Other Visit Diagnoses     Vertigo        Relevant Medications    meclizine (ANTIVERT) 12 5 MG tablet    Seasonal allergic rhinitis due to other allergic trigger        Relevant Medications    olopatadine HCl (PATADAY) 0 2 % opth drops    Allergic rhinitis, unspecified seasonality, unspecified trigger        Relevant Medications    loratadine (CLARITIN) 10 mg tablet    fluticasone (FLONASE) 50 mcg/act nasal spray    Post-menopausal        Relevant Orders    DXA bone density spine hip and pelvis    Elevated blood pressure reading in office without diagnosis of hypertension               Preventive health issues were discussed with patient, and age appropriate screening tests were ordered as noted in patient's After Visit Summary  Personalized health advice and appropriate referrals for health education or preventive services given if needed, as noted in patient's After Visit Summary  History of Present Illness:     Patient presents for Medicare Annual Wellness visit    Patient Care Team:  Ernestine Levi MD as PCP - General (Family Medicine)  Guevara Pedroza MD as PCP - [address] [place] [address] (RTE)     Problem List:     Patient Active Problem List   Diagnosis    Dyslipidemia    [facility]    History of CVA (cerebrovascular accident)    Essential hypertension    Primary osteoarthritis of left hand    Osteopenia    Obesity (BMI 30-39  9)    Prediabetes    Obesity, morbid ([facility] )    Encounter for annual wellness exam in Medicare patient      Past Medical and Surgical History:     Past Medical History:   Diagnosis Date    [facility]chronic obstructive [facility] disease) ([facility] )     Fracture 2011    rt leg    H/O: hysterectomy 2011    Shingles     Stroke [facility]) 2011     Past Surgical History:   Procedure Laterality Date    HYSTERECTOMY  2011      Family History:     Family History   Problem Relation Age of Onset    Hyperlipidemia Mother     Hypertension Father     Cancer Sister     Diabetes Family       Social History:     Social History     Socioeconomic History    Marital status:      Spouse name: Not on file    Number of children: 3    Years of education: Not on file    Highest education level: Not on file   Occupational History    Not on file   Tobacco Use    Smoking status: Former Smoker     Packs/day: 0 50     Years: 40 00     Pack years: 20 00     Types: Cigarettes     Quit date:      Years since quittin 3    Smokeless tobacco: Never Used   Vaping Use    Vaping Use: Never used   Substance and Sexual Activity    Alcohol use: No    Drug use: No    Sexual activity: Not Currently   Other Topics Concern    Not on file   Social History Narrative        No know smoke exposure     No caffeine     No alcohol    No illicit drug use     Seatbelt use     No regular exercise     Has 3 children     [facility] [place]          Social [facility]     Financial Resource Strain: Not on file   Food Insecurity: Not on file   Transportation Needs: Not on file   Physical Activity: Not on file   Stress: Not on file   Social Connections: Not on file   Intimate Partner Violence: Not on file   Housing Stability: Not on file      Medications and Allergies:     Current Outpatient Medications   Medication Sig Dispense Refill    albuterol (ProAir HFA) 90 mcg/act inhaler Inhale 2 puffs every 6 (six) hours as needed for wheezing or shortness of breath 18 g 5    aspirin (ECOTRIN LOW STRENGTH) 81 mg EC tablet Take 1 tablet (81 mg total) by mouth daily 90 tablet 1    Blood Pressure Monitoring (BLOOD PRESSURE CUFF) MISC by Does not apply route daily 1 each 0    budesonide-formoterol (Symbicort) 160-4 5 mcg/act inhaler Inhale 2 puffs 2 (two) times a day Rinse mouth after use  30 6 g 5    fluticasone (FLONASE) 50 mcg/act nasal spray 1 spray into each nostril in the morning  16 g 5    hydrochlorothiazide (HYDRODIURIL) 12 5 mg tablet Take 1 tablet (12 5 mg total) by mouth in the morning  90 tablet 1    loratadine (CLARITIN) 10 mg tablet Take 1 tablet (10 mg total) by mouth in the morning   30 tablet 2    meclizine (ANTIVERT) 12 5 MG tablet Take 1 tablet (12 5 mg total) by mouth every 12 (twelve) hours as needed for dizziness 30 tablet 2    Melatonin 5 MG TABS Take 1 tablet (5 mg total) by mouth daily at bedtime as needed (sleep issue) 90 tablet 0    olopatadine HCl (PATADAY) 0 2 % opth drops Administer 1 drop to both eyes as needed in the morning and 1 drop as needed in the evening (allergies)  2 5 mL 5    simvastatin (ZOCOR) 10 mg tablet Take 1 tablet (10 mg total) by mouth daily at bedtime 90 tablet 1    Oyster Shell 500 MG TABS Take 2 tablets (1,000 mg total) by mouth daily 180 tablet 0     No current facility-administered medications for this visit  Allergies   Allergen Reactions    Lisinopril Cough      Immunizations:     Immunization History   Administered Date(s) Administered    COVID-19 MODERNA VACC 0 5 ML IM 04/15/2021, 05/13/2021    INFLUENZA 09/23/2019    Influenza, high dose seasonal 0 7 mL 10/21/2020, 11/04/2021    Influenza, seasonal, injectable, preservative free 09/23/2019    Pneumococcal Polysaccharide PPV23 10/23/2019    influenza, trivalent, adjuvanted 09/04/2019      Health Maintenance:         Topic Date Due    Hepatitis C Screening  Completed         Topic Date Due    DTaP,Tdap,and Td Vaccines (1 - Tdap) Never done    Pneumococcal Vaccine: 65+ Years (2 - PCV) 10/23/2020    COVID-19 Vaccine (3 - Booster for Moderna series) 10/13/2021      Medicare Health Risk Assessment:     /80 (BP Location: Left arm, Patient Position: Sitting, Cuff Size: Adult)   Pulse 76   Temp 98 8 °F (37 1 °C) (Tympanic)   Resp 16   Ht 4' 11" (1 499 m)   Wt 81 1 kg (178 lb 12 8 oz)   SpO2 97%   BMI 36 11 kg/m²      Elodia Plata is here for her Subsequent Wellness visit  Last Medicare Wellness visit information reviewed, patient interviewed, no change since last AWV  Health Risk Assessment:   Patient rates overall health as good  Patient feels that their physical health rating is same  Patient is satisfied with their life  Eyesight was rated as same  Hearing was rated as same  Patient feels that their emotional and mental health rating is same   Patients states they are never, rarely angry  Patient states they are never, rarely unusually tired/fatigued  Pain experienced in the last 7 days has been none  Patient states that she has experienced no weight loss or gain in last 6 months  Depression Screening:   PHQ-2 Score: 0      Fall Risk Screening: In the past year, patient has experienced: no history of falling in past year      Urinary Incontinence Screening:   Patient has not leaked urine accidently in the last six months  Home Safety:  Patient does not have trouble with stairs inside or outside of their home  Patient has working smoke alarms and has working carbon monoxide detector  Home safety hazards include: none  Nutrition:   Current diet is Regular  Medications:   Patient is not currently taking any over-the-counter supplements  Patient is able to manage medications  Activities of Daily Living (ADLs)/Instrumental Activities of Daily Living (IADLs):   Walk and transfer into and out of bed and chair?: Yes  Dress and groom yourself?: Yes    Bathe or shower yourself?: Yes    Feed yourself?  Yes  Do your laundry/housekeeping?: Yes  Manage your money, pay your bills and track your expenses?: Yes  Make your own meals?: Yes    Do your own shopping?: Yes    Previous Hospitalizations:   Any hospitalizations or ED visits within the last 12 months?: No      Advance Care Planning:   Living will: Yes    Advanced directive: Yes      Cognitive Screening:   Provider or family/friend/caregiver concerned regarding cognition?: No    PREVENTIVE SCREENINGS      Cardiovascular Screening:    General: Screening Current      Diabetes Screening:     General: Screening Current      Colorectal Cancer Screening:     General: Screening Not Indicated, Risks and Benefits Discussed and Patient Declines      Breast Cancer Screening:     General: Screening Not Indicated and Patient Declines      Cervical Cancer Screening:    General: Screening Not Indicated      Osteoporosis Screening: General: Risks and Benefits Discussed    Due for: DXA Axial      Abdominal Aortic Aneurysm (AAA) Screening:        General: Screening Not Indicated      Lung Cancer Screening:     General: Screening Not Indicated      Hepatitis C Screening:    General: Screening Current    Screening, Brief Intervention, and Referral to Treatment (SBIRT)    Screening  Typical number of drinks in a day: 0  Typical number of drinks in a week: 0  Interpretation: Low risk drinking behavior  Single Item Drug Screening:  How often have you used an illegal drug (including marijuana) or a prescription medication for non-medical reasons in the past year? never    Single Item Drug Screen Score: 0  Interpretation: Negative screen for possible drug use disorder    Brief Intervention  Alcohol & drug use screenings were reviewed  No concerns regarding substance use disorder identified         Stanley Hernandez MD

## 2022-05-12 NOTE — PROGRESS NOTES
Assessment/Plan:    Essential hypertension  - Controlled  - Blood pressure goal per JNC VIII would be <150/90  - On  Hydrochlorothiazide 12 5   - Restrict daily salt intake up to 2 4 g  - Advised to walk 30 minutes a day 5 times a week and practice stress relieving measures      Dyslipidemia   Cholesterol levels at goal on simvastatin 10  History of CVA (cerebrovascular accident)   On aspirin and statin  Osteopenia  Continue vit d  Recheck DEXA  Prediabetes  Pt does not think the elevation is due to diet  She will try increasing activity  Encounter for annual wellness exam in Medicare patient  Completed mammograms  Completed paps  Never had a colonoscopy and doesn't want one  Doesn't want screening and understands the risks  Dexa showed osteopenia and she is taking ca and vit d  Repeat q2 yrs  Diagnoses and all orders for this visit:    Encounter for annual wellness exam in Medicare patient    Essential hypertension  -     hydrochlorothiazide (HYDRODIURIL) 12 5 mg tablet; Take 1 tablet (12 5 mg total) by mouth in the morning   -     Basic metabolic panel; Future    Dyslipidemia  -     simvastatin (ZOCOR) 10 mg tablet; Take 1 tablet (10 mg total) by mouth daily at bedtime  -     Lipid panel; Future    History of CVA (cerebrovascular accident)  -     simvastatin (ZOCOR) 10 mg tablet; Take 1 tablet (10 mg total) by mouth daily at bedtime    Vertigo  -     meclizine (ANTIVERT) 12 5 MG tablet; Take 1 tablet (12 5 mg total) by mouth every 12 (twelve) hours as needed for dizziness    Seasonal allergic rhinitis due to other allergic trigger  -     olopatadine HCl (PATADAY) 0 2 % opth drops; Administer 1 drop to both eyes as needed in the morning and 1 drop as needed in the evening (allergies)  Allergic rhinitis, unspecified seasonality, unspecified trigger  -     loratadine (CLARITIN) 10 mg tablet;  Take 1 tablet (10 mg total) by mouth in the morning   -     fluticasone (FLONASE) 50 mcg/act nasal spray; 1 spray into each nostril in the morning  Osteopenia of left hip  -     DXA bone density spine hip and pelvis; Future    Post-menopausal  -     DXA bone density spine hip and pelvis; Future    Prediabetes  -     Hemoglobin A1C; Future    Obesity, morbid (HCC)    Chronic obstructive pulmonary disease, unspecified COPD type ([facility] )    Elevated blood pressure reading in office without diagnosis of hypertension        Subjective: chronic conditions      Patient ID: Damian Klinefelter is a 68 y o  female with a history of COPD, hyperlipidemia, history of stroke, hypertension, osteopenia, obesity     HPI  Completed mammograms  Completed paps  Never had a colonoscopy and doesn't want one  Doesn't want screening and understands the risks  Dexa showed osteopenia and she is taking ca and vit d  Repeat in 2 years      Reviewed labs with patient  There was a mild bump in the creatinine  Baseline creatinine is 0 75 and currently the creatinine is 0 90  Cholesterol is at goal   The sugars have increased with an A1c at 6 0 from 5 5  The following portions of the patient's history were reviewed and updated as appropriate: allergies, current medications, past family history, past medical history, past social history, past surgical history and problem list     Review of Systems   Constitutional: Negative for fever and unexpected weight change  HENT: Negative for ear pain, sore throat and trouble swallowing  Eyes: Negative for pain and visual disturbance  Respiratory: Negative for cough, chest tightness, shortness of breath and wheezing  Cardiovascular: Negative for chest pain  Gastrointestinal: Negative for abdominal distention, abdominal pain, blood in stool, constipation, diarrhea, nausea and vomiting  Endocrine: Negative for polydipsia and polyuria  Genitourinary: Negative for dysuria and hematuria  Musculoskeletal: Negative for back pain and myalgias  Skin: Negative for rash  Neurological: Negative for syncope and headaches  Psychiatric/Behavioral: Negative for suicidal ideas  PHQ-2/9 Depression Screening    Little interest or pleasure in doing things: 0 - not at all  Feeling down, depressed, or hopeless: 0 - not at all  PHQ-2 Score: 0  PHQ-2 Interpretation: Negative depression screen           Objective:      /80 (BP Location: Left arm, Patient Position: Sitting, Cuff Size: Adult)   Pulse 76   Temp 98 8 °F (37 1 °C) (Tympanic)   Resp 16   Ht 4' 11" (1 499 m)   Wt 81 1 kg (178 lb 12 8 oz)   SpO2 97%   BMI 36 11 kg/m²          Physical Exam  Constitutional:       Appearance: She is well-developed  HENT:      Head: Normocephalic and atraumatic  Right Ear: External ear normal       Left Ear: External ear normal       Mouth/Throat:      Pharynx: No oropharyngeal exudate  Eyes:      General: No scleral icterus  Conjunctiva/sclera: Conjunctivae normal       Pupils: Pupils are equal, round, and reactive to light  Cardiovascular:      Rate and Rhythm: Normal rate and regular rhythm  Heart sounds: No murmur heard  No friction rub  No gallop  Pulmonary:      Effort: Pulmonary effort is normal  No respiratory distress  Breath sounds: Normal breath sounds  No wheezing or rales  Abdominal:      General: Bowel sounds are normal  There is no distension  Palpations: Abdomen is soft  There is no mass  Tenderness: There is no abdominal tenderness  There is no rebound  Musculoskeletal:         General: Normal range of motion  Cervical back: Normal range of motion and neck supple  Right lower leg: Edema (1+) present  Left lower leg: Edema (1+) present  Skin:     General: Skin is warm and dry  Neurological:      Mental Status: She is alert and oriented to person, place, and time

## 2022-10-11 PROBLEM — Z00.00 ENCOUNTER FOR ANNUAL WELLNESS EXAM IN MEDICARE PATIENT: Status: RESOLVED | Noted: 2022-05-12 | Resolved: 2022-10-11

## 2022-10-12 ENCOUNTER — HOSPITAL ENCOUNTER (OUTPATIENT)
Dept: RADIOLOGY | Age: 77
Discharge: HOME | End: 2022-10-12
Payer: COMMERCIAL

## 2022-10-12 DIAGNOSIS — M85.852 OSTEOPENIA OF LEFT HIP: ICD-10-CM

## 2022-10-12 DIAGNOSIS — Z78.0 POST-MENOPAUSAL: ICD-10-CM

## 2022-10-12 PROCEDURE — 77080 DXA BONE DENSITY AXIAL: CPT

## 2022-11-09 ENCOUNTER — APPOINTMENT (OUTPATIENT)
Dept: LAB | Facility: CLINIC | Age: 77
End: 2022-11-09

## 2022-11-09 DIAGNOSIS — I10 ESSENTIAL HYPERTENSION: ICD-10-CM

## 2022-11-09 DIAGNOSIS — R73.03 PREDIABETES: ICD-10-CM

## 2022-11-09 DIAGNOSIS — E78.5 DYSLIPIDEMIA: ICD-10-CM

## 2022-11-09 LAB
ANION GAP SERPL CALCULATED.3IONS-SCNC: 5 MMOL/L (ref 4–13)
BUN SERPL-MCNC: 15 MG/DL (ref 5–25)
CALCIUM SERPL-MCNC: 9.1 MG/DL (ref 8.3–10.1)
CHLORIDE SERPL-SCNC: 104 MMOL/L (ref 96–108)
CHOLEST SERPL-MCNC: 136 MG/DL
CO2 SERPL-SCNC: 28 MMOL/L (ref 21–32)
CREAT SERPL-MCNC: 0.72 MG/DL (ref 0.6–1.3)
EST. AVERAGE GLUCOSE BLD GHB EST-MCNC: 120 MG/DL
GFR SERPL CREATININE-BSD FRML MDRD: 81 ML/MIN/1.73SQ M
GLUCOSE P FAST SERPL-MCNC: 105 MG/DL (ref 65–99)
HBA1C MFR BLD: 5.8 %
HDLC SERPL-MCNC: 58 MG/DL
LDLC SERPL CALC-MCNC: 57 MG/DL (ref 0–100)
NONHDLC SERPL-MCNC: 78 MG/DL
POTASSIUM SERPL-SCNC: 3.8 MMOL/L (ref 3.5–5.3)
SODIUM SERPL-SCNC: 137 MMOL/L (ref 135–147)
TRIGL SERPL-MCNC: 104 MG/DL

## 2022-11-18 ENCOUNTER — OFFICE VISIT (OUTPATIENT)
Dept: FAMILY MEDICINE CLINIC | Facility: CLINIC | Age: 77
End: 2022-11-18

## 2022-11-18 VITALS
WEIGHT: 174.4 LBS | BODY MASS INDEX: 35.16 KG/M2 | HEART RATE: 65 BPM | TEMPERATURE: 99.6 F | OXYGEN SATURATION: 98 % | RESPIRATION RATE: 16 BRPM | DIASTOLIC BLOOD PRESSURE: 80 MMHG | SYSTOLIC BLOOD PRESSURE: 150 MMHG | HEIGHT: 59 IN

## 2022-11-18 DIAGNOSIS — J43.2 CENTRILOBULAR EMPHYSEMA (HCC): ICD-10-CM

## 2022-11-18 DIAGNOSIS — Z23 ENCOUNTER FOR VACCINATION: ICD-10-CM

## 2022-11-18 DIAGNOSIS — I10 ESSENTIAL HYPERTENSION: ICD-10-CM

## 2022-11-18 DIAGNOSIS — Z86.73 HISTORY OF CVA (CEREBROVASCULAR ACCIDENT): ICD-10-CM

## 2022-11-18 DIAGNOSIS — E78.5 DYSLIPIDEMIA: Primary | ICD-10-CM

## 2022-11-18 NOTE — ASSESSMENT & PLAN NOTE
- uncontrolled  - Blood pressure goal per JNC VIII would be <150/90  - On  Hydrochlorothiazide 12  5  bp is usually well controlled  She will monitor bps at home and fu in 1m     - Restrict daily salt intake up to 2 4 g  - Advised to walk 30 minutes a day 5 times a week and practice stress relieving measures

## 2022-11-18 NOTE — PROGRESS NOTES
Assessment/Plan:    Dyslipidemia   Cholesterol levels at goal on simvastatin 10  COPD (chronic obstructive pulmonary disease) ([facility] )   Well controlled on Symbicort and as needed albuterol  Does not see pulm  History of CVA (cerebrovascular accident)   On aspirin and statin  Essential hypertension  - uncontrolled  - Blood pressure goal per JNC VIII would be <150/90  - On  Hydrochlorothiazide 12  5  bp is usually well controlled  She will monitor bps at home and fu in 1m  - Restrict daily salt intake up to 2 4 g  - Advised to walk 30 minutes a day 5 times a week and practice stress relieving measures       Diagnoses and all orders for this visit:    Dyslipidemia    Centrilobular emphysema ([facility] )    History of CVA (cerebrovascular accident)    Essential hypertension    Encounter for vaccination  -     influenza vaccine, high-dose, PF 0 7 mL (FLUZONE HIGH-DOSE)        Subjective:  Chronic conditions checkup     Patient ID: Kyle Salguero is a 68 y o  female with a history of COPD, hyperlipidemia, history of stroke, hypertension, osteopenia, obesity     HPI  Completed mammograms  Completed paps  Never had a colonoscopy and doesn't want one  Doesn't want screening and understands the risks  Dexa showed osteopenia and she is taking ca and vit d  Repeat in 2 years       Reviewed labs with patient   kidney function stable with GFR 81, glucose mildly elevated at 105, lipids are at goal, hemoglobin A1c mildly elevated at 5 8 which is improved from 6 0  The following portions of the patient's history were reviewed and updated as appropriate: allergies, current medications, past family history, past medical history, past social history, past surgical history and problem list     Review of Systems   Constitutional: Negative for fever and unexpected weight change  HENT: Negative for ear pain, sore throat and trouble swallowing  Eyes: Negative for pain and visual disturbance     Respiratory: Negative for cough, chest tightness, shortness of breath and wheezing  Cardiovascular: Negative for chest pain  Gastrointestinal: Negative for abdominal distention, abdominal pain, blood in stool, constipation, diarrhea, nausea and vomiting  Endocrine: Negative for polydipsia and polyuria  Genitourinary: Negative for dysuria and hematuria  Musculoskeletal: Negative for back pain and myalgias  Skin: Negative for rash  Neurological: Negative for syncope and headaches  Psychiatric/Behavioral: Negative for suicidal ideas  PHQ-2/9 Depression Screening    Little interest or pleasure in doing things: 0 - not at all  Feeling down, depressed, or hopeless: 0 - not at all  PHQ-2 Score: 0  PHQ-2 Interpretation: Negative depression screen       [unfilled]    Objective:      /80 (BP Location: Left arm, Patient Position: Sitting, Cuff Size: Large)   Pulse 65   Temp 99 6 °F (37 6 °C) (Tympanic)   Resp 16   Ht 4' 11" (1 499 m)   Wt 79 1 kg (174 lb 6 4 oz)   SpO2 98%   BMI 35 22 kg/m²          Physical Exam  Constitutional:       Appearance: She is well-developed and well-nourished  HENT:      Head: Normocephalic and atraumatic  Right Ear: External ear normal       Left Ear: External ear normal       Mouth/Throat:      Pharynx: No oropharyngeal exudate  Eyes:      General: No scleral icterus  Extraocular Movements: EOM normal       Conjunctiva/sclera: Conjunctivae normal       Pupils: Pupils are equal, round, and reactive to light  Cardiovascular:      Rate and Rhythm: Normal rate and regular rhythm  Pulses: Intact distal pulses  Heart sounds: No murmur heard  No friction rub  No gallop  Pulmonary:      Effort: Pulmonary effort is normal  No respiratory distress  Breath sounds: Normal breath sounds  No wheezing or rales  Abdominal:      General: Bowel sounds are normal  There is no distension  Palpations: Abdomen is soft  There is no mass  Tenderness: There is no abdominal tenderness  There is no rebound  Musculoskeletal:         General: No edema  Normal range of motion  Cervical back: Normal range of motion and neck supple  Skin:     General: Skin is warm and dry  Neurological:      Mental Status: She is alert and oriented to person, place, and time     Psychiatric:         Mood and Affect: Mood and affect normal

## 2022-12-20 ENCOUNTER — OFFICE VISIT (OUTPATIENT)
Dept: FAMILY MEDICINE CLINIC | Facility: CLINIC | Age: 77
End: 2022-12-20

## 2022-12-20 VITALS
BODY MASS INDEX: 35.16 KG/M2 | OXYGEN SATURATION: 97 % | HEART RATE: 91 BPM | TEMPERATURE: 97.1 F | DIASTOLIC BLOOD PRESSURE: 60 MMHG | WEIGHT: 174.4 LBS | HEIGHT: 59 IN | RESPIRATION RATE: 16 BRPM | SYSTOLIC BLOOD PRESSURE: 140 MMHG

## 2022-12-20 DIAGNOSIS — E78.5 DYSLIPIDEMIA: ICD-10-CM

## 2022-12-20 DIAGNOSIS — I10 ESSENTIAL HYPERTENSION: Primary | ICD-10-CM

## 2022-12-20 NOTE — PROGRESS NOTES
Assessment/Plan:    Essential hypertension  - controlled  - Blood pressure goal per JNC VIII would be <150/90  - On  Hydrochlorothiazide 12 5    - Restrict daily salt intake up to 2 4 g  - Advised to walk 30 minutes a day 5 times a week and practice stress relieving measures         Diagnoses and all orders for this visit:    Essential hypertension  -     Comprehensive metabolic panel; Future    Dyslipidemia  -     Lipid panel; Future          Subjective: bp follow up      Patient ID: Erik Bowden is a 68 y o  female  HPI  Here for a bp follow up  Stable  The following portions of the patient's history were reviewed and updated as appropriate: allergies, current medications, past family history, past medical history, past social history, past surgical history and problem list     Review of Systems   Constitutional: Negative for fever and unexpected weight change  HENT: Negative for ear pain, sore throat and trouble swallowing  Eyes: Negative for pain and visual disturbance  Respiratory: Negative for cough, chest tightness, shortness of breath and wheezing  Cardiovascular: Negative for chest pain  Gastrointestinal: Negative for abdominal distention, abdominal pain, blood in stool, constipation, diarrhea, nausea and vomiting  Endocrine: Negative for polydipsia and polyuria  Genitourinary: Negative for dysuria and hematuria  Musculoskeletal: Negative for back pain and myalgias  Skin: Negative for rash  Neurological: Negative for syncope and headaches  Psychiatric/Behavioral: Negative for suicidal ideas           PHQ-2/9 Depression Screening         [unfilled]    Objective:      /60 (BP Location: Left arm, Patient Position: Sitting, Cuff Size: Adult)   Pulse 91   Temp (!) 97 1 °F (36 2 °C) (Tympanic)   Resp 16   Ht 4' 11" (1 499 m)   Wt 79 1 kg (174 lb 6 4 oz)   SpO2 97%   BMI 35 22 kg/m²          Physical Exam  Constitutional:       Appearance: She is well-developed  HENT:      Head: Normocephalic and atraumatic  Right Ear: External ear normal       Left Ear: External ear normal    Eyes:      General: No scleral icterus  Conjunctiva/sclera: Conjunctivae normal    Pulmonary:      Effort: Pulmonary effort is normal  No respiratory distress  Musculoskeletal:      Cervical back: Normal range of motion  Neurological:      Mental Status: She is alert and oriented to person, place, and time

## 2022-12-28 PROBLEM — I73.9 PAD (PERIPHERAL ARTERY DISEASE) (HCC): Status: ACTIVE | Noted: 2022-12-28

## 2022-12-30 DIAGNOSIS — J44.9 CHRONIC OBSTRUCTIVE PULMONARY DISEASE, UNSPECIFIED COPD TYPE (HCC): ICD-10-CM

## 2022-12-30 DIAGNOSIS — E78.5 DYSLIPIDEMIA: ICD-10-CM

## 2022-12-30 DIAGNOSIS — I10 ESSENTIAL HYPERTENSION: ICD-10-CM

## 2022-12-30 DIAGNOSIS — Z86.73 HISTORY OF CVA (CEREBROVASCULAR ACCIDENT): ICD-10-CM

## 2022-12-30 RX ORDER — BUDESONIDE AND FORMOTEROL FUMARATE DIHYDRATE 160; 4.5 UG/1; UG/1
AEROSOL RESPIRATORY (INHALATION)
Qty: 30.6 G | Refills: 0 | Status: SHIPPED | OUTPATIENT
Start: 2022-12-30

## 2022-12-30 RX ORDER — HYDROCHLOROTHIAZIDE 12.5 MG/1
TABLET ORAL
Qty: 90 TABLET | Refills: 0 | Status: SHIPPED | OUTPATIENT
Start: 2022-12-30

## 2022-12-30 RX ORDER — SIMVASTATIN 10 MG
TABLET ORAL
Qty: 90 TABLET | Refills: 0 | Status: SHIPPED | OUTPATIENT
Start: 2022-12-30

## 2023-01-25 DIAGNOSIS — R42 VERTIGO: ICD-10-CM

## 2023-01-25 RX ORDER — MECLIZINE HCL 12.5 MG/1
12.5 TABLET ORAL EVERY 12 HOURS PRN
Qty: 30 TABLET | Refills: 2 | Status: SHIPPED | OUTPATIENT
Start: 2023-01-25

## 2023-02-17 ENCOUNTER — OFFICE VISIT (OUTPATIENT)
Dept: FAMILY MEDICINE CLINIC | Facility: CLINIC | Age: 78
End: 2023-02-17

## 2023-02-17 VITALS
SYSTOLIC BLOOD PRESSURE: 142 MMHG | OXYGEN SATURATION: 96 % | TEMPERATURE: 98.1 F | WEIGHT: 171.8 LBS | BODY MASS INDEX: 34.64 KG/M2 | HEIGHT: 59 IN | RESPIRATION RATE: 16 BRPM | DIASTOLIC BLOOD PRESSURE: 86 MMHG | HEART RATE: 94 BPM

## 2023-02-17 DIAGNOSIS — J30.9 ALLERGIC RHINITIS, UNSPECIFIED SEASONALITY, UNSPECIFIED TRIGGER: ICD-10-CM

## 2023-02-17 RX ORDER — BROMFENAC SODIUM 0.7 MG/ML
SOLUTION/ DROPS OPHTHALMIC
COMMUNITY
Start: 2023-02-03

## 2023-02-17 RX ORDER — OFLOXACIN 3 MG/ML
SOLUTION/ DROPS OPHTHALMIC
COMMUNITY
Start: 2023-02-02

## 2023-02-17 RX ORDER — FLUTICASONE PROPIONATE 50 MCG
1 SPRAY, SUSPENSION (ML) NASAL DAILY
Qty: 16 G | Refills: 5 | Status: SHIPPED | OUTPATIENT
Start: 2023-02-17

## 2023-02-17 RX ORDER — PREDNISOLONE ACETATE 10 MG/ML
SUSPENSION/ DROPS OPHTHALMIC
COMMUNITY
Start: 2023-02-02

## 2023-02-17 NOTE — PROGRESS NOTES
FAMILY [facility] PRE-OPERATIVE EVALUATION  [facility] FAMILY PRACTICE    NAME: Asya Bustos  AGE: 68 y o  SEX: female  : 1945     DATE: 2023    Family [facility] Pre-Operative Evaluation      Chief Complaint: Pre-operative Evaluation     Surgery: cataract   Anticipated Date of Surgery: OD 3/1 and OS 3/15     History of Present Illness:     Patient has no prior history of bleeding issues or blood clots  Chronic conditions, medications and allergies were reviewed  There is no currently active infectious process  Assessment of Cardiac Risk:  No unstable or severe angina or MI in the last 6 weeks or history of stent placement in the last year   No decompensated heart failure (e g  New onset heart failure, NYHA functional class IV heart failure, or worsening existing heart failure) in past 3 mos  No severe heart valve disease including aortic stenosis or symptomatic mitral stenosis     Exercise Capacity:  Able to walk 4 blocks without symptoms  Able to walk 2 flights without symptoms    NO Prior Anesthesia Reactions       No prolonged steroid use in past 6 mos  P A T  If done reviewed  Review of Systems:     Review of Systems   Constitutional: Negative for fever and unexpected weight change  HENT: Negative for ear pain, sore throat and trouble swallowing  Eyes: Negative for pain and visual disturbance  Respiratory: Negative for cough, chest tightness, shortness of breath and wheezing  Cardiovascular: Negative for chest pain  Gastrointestinal: Negative for abdominal distention, abdominal pain, blood in stool, constipation, diarrhea, nausea and vomiting  Endocrine: Negative for polydipsia and polyuria  Genitourinary: Negative for dysuria and hematuria  Musculoskeletal: Negative for back pain and myalgias  Skin: Negative for rash  Neurological: Negative for syncope and headaches     Psychiatric/Behavioral: Negative for suicidal ideas        Current Problem List:     Patient Active Problem List   Diagnosis   • Dyslipidemia   • COPD (chronic obstructive pulmonary disease) ([facility] )   • History of CVA (cerebrovascular accident)   • Essential hypertension   • Primary osteoarthritis of left hand   • Osteopenia   • Obesity (BMI 30-39  9)   • Prediabetes   • Obesity, morbid ([facility] )   • PAD (peripheral artery disease) ([facility])       Allergies: Allergies   Allergen Reactions   • Lisinopril Cough       Current Medications:       Current Outpatient Medications:   •  albuterol (ProAir HFA) 90 mcg/act inhaler, Inhale 2 puffs every 6 (six) hours as needed for wheezing or shortness of breath, Disp: 18 g, Rfl: 5  •  aspirin (ECOTRIN LOW STRENGTH) 81 mg EC tablet, Take 1 tablet (81 mg total) by mouth daily, Disp: 90 tablet, Rfl: 1  •  Blood Pressure Monitoring (BLOOD PRESSURE CUFF) MISC, by Does not apply route daily, Disp: 1 each, Rfl: 0  •  fluticasone (FLONASE) 50 mcg/act nasal spray, 1 spray into each nostril daily, Disp: 16 g, Rfl: 5  •  hydrochlorothiazide (HYDRODIURIL) 12 5 mg tablet, TAKE ONE TABLET BY MOUTH ONE TIME DAILY IN THE MORNING, Disp: 90 tablet, Rfl: 0  •  meclizine (ANTIVERT) 12 5 MG tablet, Take 1 tablet (12 5 mg total) by mouth every 12 (twelve) hours as needed for dizziness, Disp: 30 tablet, Rfl: 2  •  Melatonin 5 MG TABS, Take 1 tablet (5 mg total) by mouth daily at bedtime as needed (sleep issue), Disp: 90 tablet, Rfl: 0  •  simvastatin (ZOCOR) 10 mg tablet, TAKE ONE TABLET BY MOUTH ONE TIME DAILY AT BEDTIME, Disp: 90 tablet, Rfl: 0  •  Symbicort 160-4 5 MCG/ACT inhaler, inhale two puffs by mouth two times a day  rinse mouth after use, Disp: 30 6 g, Rfl: 0  •  loratadine (CLARITIN) 10 mg tablet, Take 1 tablet (10 mg total) by mouth in the morning   (Patient not taking: Reported on 11/18/2022), Disp: 30 tablet, Rfl: 2  •  ofloxacin (OCUFLOX) 0 3 % ophthalmic solution, , Disp: , Rfl:   •  olopatadine HCl (PATADAY) 0 2 % opth drops, Administer 1 drop to both eyes as needed in the morning and 1 drop as needed in the evening (allergies)   (Patient not taking: Reported on 2022), Disp: 2 5 mL, Rfl: 5  •  Oyster Shell 500 MG TABS, Take 2 tablets (1,000 mg total) by mouth daily, Disp: 180 tablet, Rfl: 0  •  prednisoLONE acetate (PRED FORTE) 1 % ophthalmic suspension, , Disp: , Rfl:   •  Prolensa 0 07 % SOLN, , Disp: , Rfl:     Past Medical History:       Past Medical History:   Diagnosis Date   • COPD (chronic obstructive pulmonary disease) ([facility] )    • Fracture     rt leg   • H/O: hysterectomy    • Shingles    • Stroke ([facility] )         Past Surgical History:   Procedure Laterality Date   • HYSTERECTOMY          Family History   Problem Relation Age of Onset   • Hyperlipidemia Mother    • Hypertension Father    • Cancer Sister    • Diabetes Family         Social History     Socioeconomic History   • Marital status:      Spouse name: Not on file   • Number of children: 3   • Years of education: Not on file   • Highest education level: Not on file   Occupational History   • Not on file   Tobacco Use   • Smoking status: Former     Packs/day: 0 50     Years: 40 00     Pack years: 20 00     Types: Cigarettes     Quit date:      Years since quittin 1   • Smokeless tobacco: Never   Vaping Use   • Vaping Use: Never used   Substance and Sexual Activity   • Alcohol use: No   • Drug use: No   • Sexual activity: Not Currently   Other Topics Concern   • Not on file   Social History Narrative        No know smoke exposure     No caffeine     No alcohol    No illicit drug use     Seatbelt use     No regular exercise     Has 3 children     [facility]          Social Determinants of Health     Financial Resource Strain: Not on file   Food Insecurity: Not on file   Transportation Needs: Not on file   Physical Activity: Not on file   Stress: Not on file   Social Connections: Not on file   Intimate Partner Violence: Not on file   Housing Stability: Not on file        Physical Exam:     /86 (BP Location: Left arm, Patient Position: Sitting, Cuff Size: Adult)   Pulse 94   Temp 98 1 °F (36 7 °C) (Tympanic)   Resp 16   Ht 4' 11" (1 499 m)   Wt 77 9 kg (171 lb 12 8 oz)   SpO2 96%   BMI 34 70 kg/m²     Physical Exam  Constitutional:       Appearance: She is well-developed  HENT:      Head: Normocephalic and atraumatic  Right Ear: External ear normal       Left Ear: External ear normal       Mouth/Throat:      Pharynx: No oropharyngeal exudate  Eyes:      General: No scleral icterus  Conjunctiva/sclera: Conjunctivae normal       Pupils: Pupils are equal, round, and reactive to light  Cardiovascular:      Rate and Rhythm: Normal rate and regular rhythm  Heart sounds: No murmur heard  No friction rub  No gallop  Pulmonary:      Effort: Pulmonary effort is normal  No respiratory distress  Breath sounds: Normal breath sounds  No wheezing or rales  Abdominal:      General: Bowel sounds are normal  There is no distension  Palpations: Abdomen is soft  There is no mass  Tenderness: There is no abdominal tenderness  There is no rebound  Musculoskeletal:         General: Normal range of motion  Cervical back: Normal range of motion and neck supple  Skin:     General: Skin is warm and dry  Neurological:      Mental Status: She is alert and oriented to person, place, and time  Operative site has been examined and clear of skin infection and inflammation  Assessment & Recommendations:     Patient is cleared for surgery as detailed above       Surgical Procedure risk category: low risk  Patient specific operative risk categegory: low risk    Continue meds on day of surgery with small sips of water  Clearance form faxed to [facility]

## 2023-03-20 DIAGNOSIS — J44.9 CHRONIC OBSTRUCTIVE PULMONARY DISEASE, UNSPECIFIED COPD TYPE (HCC): ICD-10-CM

## 2023-03-20 RX ORDER — BUDESONIDE AND FORMOTEROL FUMARATE DIHYDRATE 160; 4.5 UG/1; UG/1
AEROSOL RESPIRATORY (INHALATION)
Qty: 30.6 G | Refills: 0 | Status: SHIPPED | OUTPATIENT
Start: 2023-03-20

## 2023-03-30 DIAGNOSIS — Z86.73 HISTORY OF CVA (CEREBROVASCULAR ACCIDENT): ICD-10-CM

## 2023-03-30 DIAGNOSIS — E78.5 DYSLIPIDEMIA: ICD-10-CM

## 2023-03-30 RX ORDER — SIMVASTATIN 10 MG
TABLET ORAL
Qty: 90 TABLET | Refills: 0 | Status: SHIPPED | OUTPATIENT
Start: 2023-03-30

## 2023-06-19 ENCOUNTER — APPOINTMENT (OUTPATIENT)
Dept: LAB | Facility: CLINIC | Age: 78
End: 2023-06-19
Payer: COMMERCIAL

## 2023-06-19 DIAGNOSIS — E78.5 DYSLIPIDEMIA: ICD-10-CM

## 2023-06-19 DIAGNOSIS — I10 ESSENTIAL HYPERTENSION: ICD-10-CM

## 2023-06-19 LAB
ALBUMIN SERPL BCP-MCNC: 3.1 G/DL (ref 3.5–5)
ALP SERPL-CCNC: 86 U/L (ref 46–116)
ALT SERPL W P-5'-P-CCNC: 27 U/L (ref 12–78)
ANION GAP SERPL CALCULATED.3IONS-SCNC: 3 MMOL/L (ref 4–13)
AST SERPL W P-5'-P-CCNC: 28 U/L (ref 5–45)
BILIRUB SERPL-MCNC: 0.42 MG/DL (ref 0.2–1)
BUN SERPL-MCNC: 17 MG/DL (ref 5–25)
CALCIUM ALBUM COR SERPL-MCNC: 9.9 MG/DL (ref 8.3–10.1)
CALCIUM SERPL-MCNC: 9.2 MG/DL (ref 8.3–10.1)
CHLORIDE SERPL-SCNC: 109 MMOL/L (ref 96–108)
CHOLEST SERPL-MCNC: 143 MG/DL
CO2 SERPL-SCNC: 29 MMOL/L (ref 21–32)
CREAT SERPL-MCNC: 0.7 MG/DL (ref 0.6–1.3)
GFR SERPL CREATININE-BSD FRML MDRD: 83 ML/MIN/1.73SQ M
GLUCOSE P FAST SERPL-MCNC: 99 MG/DL (ref 65–99)
HDLC SERPL-MCNC: 64 MG/DL
LDLC SERPL CALC-MCNC: 65 MG/DL (ref 0–100)
NONHDLC SERPL-MCNC: 79 MG/DL
POTASSIUM SERPL-SCNC: 3.5 MMOL/L (ref 3.5–5.3)
PROT SERPL-MCNC: 7.1 G/DL (ref 6.4–8.4)
SODIUM SERPL-SCNC: 141 MMOL/L (ref 135–147)
TRIGL SERPL-MCNC: 69 MG/DL

## 2023-06-19 PROCEDURE — 36415 COLL VENOUS BLD VENIPUNCTURE: CPT

## 2023-06-19 PROCEDURE — 80061 LIPID PANEL: CPT

## 2023-06-19 PROCEDURE — 80053 COMPREHEN METABOLIC PANEL: CPT

## 2023-06-21 ENCOUNTER — OFFICE VISIT (OUTPATIENT)
Dept: FAMILY MEDICINE CLINIC | Facility: CLINIC | Age: 78
End: 2023-06-21
Payer: COMMERCIAL

## 2023-06-21 VITALS
DIASTOLIC BLOOD PRESSURE: 70 MMHG | RESPIRATION RATE: 16 BRPM | TEMPERATURE: 97.2 F | WEIGHT: 175.6 LBS | BODY MASS INDEX: 35.4 KG/M2 | HEIGHT: 59 IN | SYSTOLIC BLOOD PRESSURE: 142 MMHG | OXYGEN SATURATION: 95 % | HEART RATE: 78 BPM

## 2023-06-21 DIAGNOSIS — Z86.73 HISTORY OF CVA (CEREBROVASCULAR ACCIDENT): ICD-10-CM

## 2023-06-21 DIAGNOSIS — E78.5 DYSLIPIDEMIA: ICD-10-CM

## 2023-06-21 DIAGNOSIS — H60.542 ACUTE ECZEMATOID OTITIS EXTERNA OF LEFT EAR: ICD-10-CM

## 2023-06-21 DIAGNOSIS — Z00.00 ENCOUNTER FOR ANNUAL WELLNESS EXAM IN MEDICARE PATIENT: Primary | ICD-10-CM

## 2023-06-21 DIAGNOSIS — R73.03 PREDIABETES: ICD-10-CM

## 2023-06-21 DIAGNOSIS — I73.9 PAD (PERIPHERAL ARTERY DISEASE) (HCC): ICD-10-CM

## 2023-06-21 DIAGNOSIS — E66.01 OBESITY, MORBID (HCC): ICD-10-CM

## 2023-06-21 DIAGNOSIS — I10 ESSENTIAL HYPERTENSION: ICD-10-CM

## 2023-06-21 DIAGNOSIS — J43.2 CENTRILOBULAR EMPHYSEMA (HCC): ICD-10-CM

## 2023-06-21 PROCEDURE — G0439 PPPS, SUBSEQ VISIT: HCPCS | Performed by: FAMILY MEDICINE

## 2023-06-21 PROCEDURE — 99214 OFFICE O/P EST MOD 30 MIN: CPT | Performed by: FAMILY MEDICINE

## 2023-06-21 RX ORDER — FLUOCINONIDE TOPICAL SOLUTION USP, 0.05% 0.5 MG/ML
SOLUTION TOPICAL 2 TIMES DAILY
Qty: 60 ML | Refills: 0 | Status: SHIPPED | OUTPATIENT
Start: 2023-06-21

## 2023-06-21 NOTE — ASSESSMENT & PLAN NOTE
Completed mammograms  Completed paps  Never had a colonoscopy and doesn't want one  Doesn't want screening and understands the risks  Dexa showed osteopenia and she is taking ca and vit d  Repeat q2 yrs  Last was 2022

## 2023-06-21 NOTE — PATIENT INSTRUCTIONS
Medicare Preventive Visit Patient Instructions  Thank you for completing your Welcome to Medicare Visit or Medicare Annual Wellness Visit today  Your next wellness visit will be due in one year (6/21/2024)  The screening/preventive services that you may require over the next 5-10 years are detailed below  Some tests may not apply to you based off risk factors and/or age  Screening tests ordered at today's visit but not completed yet may show as past due  Also, please note that scanned in results may not display below  Preventive Screenings:  Service Recommendations Previous Testing/Comments   Colorectal Cancer Screening  * Colonoscopy    * Fecal Occult Blood Test (FOBT)/Fecal Immunochemical Test (FIT)  * Fecal DNA/Cologuard Test  * Flexible Sigmoidoscopy Age: 39-70 years old   Colonoscopy: every 10 years (may be performed more frequently if at higher risk)  OR  FOBT/FIT: every 1 year  OR  Cologuard: every 3 years  OR  Sigmoidoscopy: every 5 years  Screening may be recommended earlier than age 39 if at higher risk for colorectal cancer  Also, an individualized decision between you and your healthcare provider will decide whether screening between the ages of 74-80 would be appropriate  Colonoscopy: Not on file  FOBT/FIT: Not on file  Cologuard: Not on file  Sigmoidoscopy: Not on file          Breast Cancer Screening Age: 36 years old  Frequency: every 1-2 years  Not required if history of left and right mastectomy Mammogram: Not on file        Cervical Cancer Screening Between the ages of 21-29, pap smear recommended once every 3 years  Between the ages of 33-67, can perform pap smear with HPV co-testing every 5 years     Recommendations may differ for women with a history of total hysterectomy, cervical cancer, or abnormal pap smears in past  Pap Smear: Not on file    Screening Not Indicated   Hepatitis C Screening Once for adults born between [facility]  More frequently in patients at high risk for Hepatitis C Hep C Antibody: 12/03/2018    Screening Current   Diabetes Screening 1-2 times per year if you're at risk for diabetes or have pre-diabetes Fasting glucose: 99 mg/dL (6/19/2023)  A1C: 5 8 % (11/9/2022)  Screening Current   Cholesterol Screening Once every 5 years if you don't have a lipid disorder  May order more often based on risk factors  Lipid panel: 06/19/2023    Screening Current     Other Preventive Screenings Covered by Medicare:  1  Abdominal Aortic Aneurysm (AAA) Screening: covered once if your at risk  You're considered to be at risk if you have a family history of AAA  2  Lung Cancer Screening: covers low dose CT scan once per year if you meet all of the following conditions: (1) Age 50-69; (2) No signs or symptoms of lung cancer; (3) Current smoker or have quit smoking within the last 15 years; (4) You have a tobacco smoking history of at least 20 pack years (packs per day multiplied by number of years you smoked); (5) You get a written order from a healthcare provider  3  Glaucoma Screening: covered annually if you're considered high risk: (1) You have diabetes OR (2) Family history of glaucoma OR (3)  aged 48 and older OR (3)  American aged 72 and older  3  Osteoporosis Screening: covered every 2 years if you meet one of the following conditions: (1) You're estrogen deficient and at risk for osteoporosis based off medical history and other findings; (2) Have a vertebral abnormality; (3) On glucocorticoid therapy for more than 3 months; (4) Have primary hyperparathyroidism; (5) On osteoporosis medications and need to assess response to drug therapy  · Last bone density test (DXA Scan): 10/12/2022   5  HIV Screening: covered annually if you're between the age of 15-65  Also covered annually if you are younger than 13 and older than 72 with risk factors for HIV infection   For pregnant patients, it is covered up to 3 times per pregnancy  Immunizations:  Immunization Recommendations   Influenza Vaccine Annual influenza vaccination during flu season is recommended for all persons aged >= 6 months who do not have contraindications   Pneumococcal Vaccine   * Pneumococcal conjugate vaccine = PCV13 (Prevnar 13), PCV15 (Vaxneuvance), PCV20 (Prevnar 20)  * Pneumococcal polysaccharide vaccine = PPSV23 (Pneumovax) Adults 25-60 years old: 1-3 doses may be recommended based on certain risk factors  Adults 72 years old: 1-2 doses may be recommended based off what pneumonia vaccine you previously received   Hepatitis B Vaccine 3 dose series if at intermediate or high risk (ex: diabetes, end stage renal disease, liver disease)   Tetanus (Td) Vaccine - COST NOT COVERED BY MEDICARE PART B Following completion of primary series, a booster dose should be given every 10 years to maintain immunity against tetanus  Td may also be given as tetanus wound prophylaxis  Tdap Vaccine - COST NOT COVERED BY MEDICARE PART B Recommended at least once for all adults  For pregnant patients, recommended with each pregnancy  Shingles Vaccine (Shingrix) - COST NOT COVERED BY MEDICARE PART B  2 shot series recommended in those aged 48 and above     Health Maintenance Due:      Topic Date Due   • Lung Cancer Screening  Never done   • Hepatitis C Screening  Completed     Immunizations Due:      Topic Date Due   • Pneumococcal Vaccine: 65+ Years (2 - PCV) 10/23/2020   • COVID-19 Vaccine (3 - Moderna series) 07/08/2021     Advance Directives   What are advance directives? Advance directives are legal documents that state your wishes and plans for medical care  These plans are made ahead of time in case you lose your ability to make decisions for yourself  Advance directives can apply to any medical decision, such as the treatments you want, and if you want to donate organs  What are the types of advance directives?   There are many types of advance directives, and each state has rules about how to use them  You may choose a combination of any of the following:  · Living will: This is a written record of the treatment you want  You can also choose which treatments you do not want, which to limit, and which to stop at a certain time  This includes surgery, medicine, IV fluid, and tube feedings  · Durable power of  for healthcare [facility]): This is a written record that states who you want to make healthcare choices for you when you are unable to make them for yourself  This person, called a proxy, is usually a family member or a friend  You may choose more than 1 proxy  · Do not resuscitate (DNR) order:  A DNR order is used in case your heart stops beating or you stop breathing  It is a request not to have certain forms of treatment, such as CPR  A DNR order may be included in other types of advance directives  · Medical directive: This covers the care that you want if you are in a coma, near death, or unable to make decisions for yourself  You can list the treatments you want for each condition  Treatment may include pain medicine, surgery, blood transfusions, dialysis, IV or tube feedings, and a ventilator (breathing machine)  · Values history: This document has questions about your views, beliefs, and how you feel and think about life  This information can help others choose the care that you would choose  Why are advance directives important? An advance directive helps you control your care  Although spoken wishes may be used, it is better to have your wishes written down  Spoken wishes can be misunderstood, or not followed  Treatments may be given even if you do not want them  An advance directive may make it easier for your family to make difficult choices about your care     Weight Management   Why it is important to manage your weight:  Being overweight increases your risk of health conditions such as heart disease, high blood pressure, type 2 diabetes, and certain types of cancer  It can also increase your risk for osteoarthritis, sleep apnea, and other respiratory problems  Aim for a slow, steady weight loss  Even a small amount of weight loss can lower your risk of health problems  How to lose weight safely:  A safe and healthy way to lose weight is to eat fewer calories and get regular exercise  You can lose up about 1 pound a week by decreasing the number of calories you eat by 500 calories each day  Healthy meal plan for weight management:  A healthy meal plan includes a variety of foods, contains fewer calories, and helps you stay healthy  A healthy meal plan includes the following:  · Eat whole-grain foods more often  A healthy meal plan should contain fiber  Fiber is the part of grains, fruits, and vegetables that is not broken down by your body  Whole-grain foods are healthy and provide extra fiber in your diet  Some examples of whole-grain foods are whole-wheat breads and pastas, oatmeal, brown rice, and bulgur  · Eat a variety of vegetables every day  Include dark, leafy greens such as spinach, kale, isabell greens, and mustard greens  Eat yellow and orange vegetables such as carrots, sweet potatoes, and winter squash  · Eat a variety of fruits every day  Choose fresh or canned fruit (canned in its own juice or light syrup) instead of juice  Fruit juice has very little or no fiber  · Eat low-fat dairy foods  Drink fat-free (skim) milk or 1% milk  Eat fat-free yogurt and low-fat cottage cheese  Try low-fat cheeses such as mozzarella and other reduced-fat cheeses  · Choose meat and other protein foods that are low in fat  Choose beans or other legumes such as split peas or lentils  Choose fish, skinless poultry (chicken or turkey), or lean cuts of red meat (beef or pork)  Before you cook meat or poultry, cut off any visible fat  · Use less fat and oil  Try baking foods instead of frying them   Add less fat, such as margarine, sour cream, regular salad dressing and mayonnaise to foods  Eat fewer high-fat foods  Some examples of high-fat foods include french fries, doughnuts, ice cream, and cakes  · Eat fewer sweets  Limit foods and drinks that are high in sugar  This includes candy, cookies, regular soda, and sweetened drinks  Exercise:  Exercise at least 30 minutes per day on most days of the week  Some examples of exercise include walking, biking, dancing, and swimming  You can also fit in more physical activity by taking the stairs instead of the elevator or parking farther away from stores  Ask your healthcare provider about the best exercise plan for you  © Copyright [employer] 2018 Information is for End User's use only and may not be sold, redistributed or otherwise used for commercial purposes   All illustrations and images included in CareNotes® are the copyrighted property of A D A M , Inc  or [address] [place] [address]

## 2023-06-21 NOTE — PROGRESS NOTES
Assessment and Plan:     Problem List Items Addressed This Visit        Respiratory    COPD (chronic obstructive pulmonary disease) ([facility] )      Well controlled on Symbicort and as needed albuterol  Does not see pulm  Cardiovascular and Mediastinum    Essential hypertension     - controlled  - Blood pressure goal per [facility] VIII would be <150/90  - On  Hydrochlorothiazide 12 5    - Restrict daily salt intake up to 2 4 g  - Advised to walk 30 minutes a day 5 times a week and practice stress relieving measures           Relevant Orders    Basic metabolic panel    PAD (peripheral artery disease) (HCC)     Continue asa and statin  Other    Dyslipidemia      Cholesterol levels at goal on simvastatin 10  Relevant Orders    Lipid panel    History of CVA (cerebrovascular accident)      On aspirin and statin  Prediabetes     Recheck next visit  Relevant Orders    Hemoglobin A1C    Obesity, morbid ([facility] )    Encounter for annual wellness exam in Medicare patient - Primary     Completed mammograms  Completed paps  Never had a colonoscopy and doesn't want one  Doesn't want screening and understands the risks  Dexa showed osteopenia and she is taking ca and vit d  Repeat q2 yrs  Last was 2022  Other Visit Diagnoses     Acute eczematoid otitis externa of left ear        Relevant Medications    fluocinonide (LIDEX) 0 05 % external solution           Preventive health issues were discussed with patient, and age appropriate screening tests were ordered as noted in patient's After Visit Summary  Personalized health advice and appropriate referrals for health education or preventive services given if needed, as noted in patient's After Visit Summary  History of Present Illness:     Patient presents for a Medicare Wellness Visit    HPI     Labs reviewed  CMP stable, lipids at goal, cbc WNL     Patient Care Team:  Oni Rios MD as PCP - General (Family Medicine)  Wilman Joseph MD as PCP - [address] [facility] [facility] ([facility])     Review of Systems:     Review of Systems   Constitutional: Negative for fever and unexpected weight change  HENT: Negative for ear pain, sore throat and trouble swallowing  Eyes: Negative for pain and visual disturbance  Respiratory: Negative for cough, chest tightness, shortness of breath and wheezing  Cardiovascular: Negative for chest pain  Gastrointestinal: Negative for abdominal distention, abdominal pain, blood in stool, constipation, diarrhea, nausea and vomiting  Endocrine: Negative for polydipsia and polyuria  Genitourinary: Negative for dysuria and hematuria  Musculoskeletal: Negative for back pain and myalgias  Skin: Negative for rash  Neurological: Negative for syncope and headaches  Psychiatric/Behavioral: Negative for suicidal ideas  Problem List:     Patient Active Problem List   Diagnosis   • Dyslipidemia   • COPD (chronic obstructive pulmonary disease) ([facility] )   • History of CVA (cerebrovascular accident)   • Essential hypertension   • Primary osteoarthritis of left hand   • Osteopenia   • Obesity (BMI 30-39  9)   • Prediabetes   • Obesity, morbid ([facility] )   • Encounter for annual wellness exam in Medicare patient   • PAD (peripheral artery disease) ([facility] )      Past Medical and Surgical History:     Past Medical History:   Diagnosis Date   • COPD (chronic obstructive pulmonary disease) ([facility] )    • Fracture 2011    rt leg   • H/O: hysterectomy 2011   • Shingles    • Stroke [facility]) 2011     Past Surgical History:   Procedure Laterality Date   • HYSTERECTOMY  2011      Family History:     Family History   Problem Relation Age of Onset   • Hyperlipidemia Mother    • Hypertension Father    • Cancer Sister    • Diabetes Family       Social History:     Social History     Socioeconomic History   • Marital status:      Spouse name: None   • Number of children: 3   • Years of education: None   • Highest education level: None   Occupational History   • None   Tobacco Use   • Smoking status: Former     Packs/day: 0 50     Years: 40 00     Total pack years: 20 00     Types: Cigarettes     Quit date:      Years since quittin 4   • Smokeless tobacco: Never   Vaping Use   • Vaping Use: Never used   Substance and Sexual Activity   • Alcohol use: No   • Drug use: No   • Sexual activity: Not Currently   Other Topics Concern   • None   Social History Narrative        No know smoke exposure     No caffeine     No alcohol    No illicit drug use     Seatbelt use     No regular exercise     Has 3 children     [facility]          Social Determinants of Health     Financial Resource Strain: Low Risk  (2023)    Overall Financial Resource Strain (CARDIA)    • Difficulty of Paying Living Expenses: Not hard at all   Food Insecurity: No Food Insecurity (2021)    Hunger Vital Sign    • Worried About Running Out of Food in the Last Year: Never true    • Ran Out of Food in the Last Year: Never true   Transportation Needs: No Transportation Needs (2023)    PRAPARE - Transportation    • Lack of Transportation (Medical): No    • Lack of Transportation (Non-Medical):  No   Physical Activity: Inactive (2021)    Exercise Vital Sign    • Days of Exercise per Week: 0 days    • Minutes of Exercise per Session: 0 min   Stress: No Stress Concern Present (2021)    Gilda7 Loy Acosta    • Feeling of Stress : Not at all   Social Connections: Unknown (2021)    Social Connection and Isolation Panel [NHANES]    • Frequency of Communication with Friends and Family: Patient refused    • Frequency of Social Gatherings with Friends and Family: Patient refused    • Attends L[employer] Services: Patient refused    • Active Member of Clubs or Organizations: Patient refused    • Attends Club or Organization Meetings: Patient refused    • Marital Status: Patient refused Intimate Partner Violence: Not At Risk (4/28/2021)    Humiliation, Afraid, Rape, and Kick questionnaire    • Fear of Current or Ex-Partner: No    • Emotionally Abused: No    • Physically Abused: No    • Sexually Abused: No   Housing Stability: Not on file      Medications and Allergies:     Current Outpatient Medications   Medication Sig Dispense Refill   • albuterol (ProAir HFA) 90 mcg/act inhaler Inhale 2 puffs every 6 (six) hours as needed for wheezing or shortness of breath 18 g 5   • aspirin (ECOTRIN LOW STRENGTH) 81 mg EC tablet Take 1 tablet (81 mg total) by mouth daily 90 tablet 1   • Blood Pressure Monitoring (BLOOD PRESSURE CUFF) MISC by Does not apply route daily 1 each 0   • fluocinonide (LIDEX) 0 05 % external solution Apply topically 2 (two) times a day 60 mL 0   • fluticasone (FLONASE) 50 mcg/act nasal spray 1 spray into each nostril daily 16 g 5   • hydrochlorothiazide (HYDRODIURIL) 12 5 mg tablet Take 1 tablet (12 5 mg total) by mouth daily 90 tablet 1   • loratadine (CLARITIN) 10 mg tablet Take 1 tablet (10 mg total) by mouth in the morning  30 tablet 2   • meclizine (ANTIVERT) 12 5 MG tablet Take 1 tablet (12 5 mg total) by mouth every 12 (twelve) hours as needed for dizziness 30 tablet 2   • Melatonin 5 MG TABS Take 1 tablet (5 mg total) by mouth daily at bedtime as needed (sleep issue) 90 tablet 0   • simvastatin (ZOCOR) 10 mg tablet TAKE ONE TABLET BY MOUTH DAILY AT BEDTIME 90 tablet 0   • Symbicort 160-4 5 MCG/ACT inhaler INHALE 2 PUFFS BY MOUTH 2 TIMES A DAY  RINSE MOUTH AFTER USE 30 6 g 0   • meclizine (ANTIVERT) 12 5 MG tablet Take 1 tablet (12 5 mg total) by mouth 3 (three) times a day as needed for dizziness 60 tablet 2   • ofloxacin (OCUFLOX) 0 3 % ophthalmic solution  (Patient not taking: Reported on 2/17/2023)     • olopatadine HCl (PATADAY) 0 2 % opth drops Administer 1 drop to both eyes as needed in the morning and 1 drop as needed in the evening (allergies)   (Patient not taking: Reported on 11/18/2022) 2 5 mL 5   • Oyster Shell 500 MG TABS Take 2 tablets (1,000 mg total) by mouth daily 180 tablet 0   • prednisoLONE acetate (PRED FORTE) 1 % ophthalmic suspension  (Patient not taking: Reported on 2/17/2023)     • Prolensa 0 07 % SOLN  (Patient not taking: Reported on 4/20/2023)       No current facility-administered medications for this visit  Allergies   Allergen Reactions   • Lisinopril Cough      Immunizations:     Immunization History   Administered Date(s) Administered   • COVID-19 MODERNA VACC 0 5 ML IM 04/15/2021, 05/13/2021   • INFLUENZA 09/23/2019   • Influenza, high dose seasonal 0 7 mL 10/21/2020, 11/04/2021, 11/18/2022   • Influenza, seasonal, injectable, preservative free 09/23/2019   • Pneumococcal Polysaccharide PPV23 10/23/2019   • influenza, trivalent, adjuvanted 09/04/2019      Health Maintenance:         Topic Date Due   • Lung Cancer Screening  Never done   • Hepatitis C Screening  Completed         Topic Date Due   • Pneumococcal Vaccine: 65+ Years (2 - PCV) 10/23/2020   • COVID-19 Vaccine (3 - Moderna series) 07/08/2021      Medicare Screening Tests and Risk Assessments:     Merly Henriquez is here for her Subsequent Wellness visit  Last Medicare Wellness visit information reviewed, patient interviewed, no change since last AWV  Health Risk Assessment:   Patient rates overall health as good  Patient feels that their physical health rating is same  Patient is satisfied with their life  Eyesight was rated as same  Hearing was rated as same  Patient feels that their emotional and mental health rating is same  Patients states they are never, rarely angry  Patient states they are never, rarely unusually tired/fatigued  Pain experienced in the last 7 days has been none  Patient states that she has experienced no weight loss or gain in last 6 months  Depression Screening:   PHQ-2 Score: 0      Fall Risk Screening:    In the past year, patient has experienced: no history of falling in past year      Home Safety:  Patient does not have trouble with stairs inside or outside of their home  Patient has working smoke alarms and has working carbon monoxide detector  Home safety hazards include: none  Nutrition:   Current diet is Regular  Medications:   Patient is currently taking over-the-counter supplements  OTC medications include: see medication list  Patient is able to manage medications  Activities of Daily Living (ADLs)/Instrumental Activities of Daily Living (IADLs):   Walk and transfer into and out of bed and chair?: Yes  Dress and groom yourself?: Yes    Bathe or shower yourself?: Yes    Feed yourself?  Yes  Do your laundry/housekeeping?: Yes  Manage your money, pay your bills and track your expenses?: Yes  Make your own meals?: Yes    Do your own shopping?: Yes    Previous Hospitalizations:   Any hospitalizations or ED visits within the last 12 months?: Yes    How many hospitalizations have you had in the last year?: 1-2    Advance Care Planning:   Living will: Yes    Advanced directive: Yes      Cognitive Screening:   Provider or family/friend/caregiver concerned regarding cognition?: No    PREVENTIVE SCREENINGS      Cardiovascular Screening:    General: Screening Current      Diabetes Screening:     General: Screening Current      Colorectal Cancer Screening:     General: Patient Declines      Breast Cancer Screening:     General: Screening Not Indicated      Cervical Cancer Screening:    General: Screening Not Indicated and Patient Declines      Osteoporosis Screening:    General: Screening Current      Abdominal Aortic Aneurysm (AAA) Screening:        General: Screening Not Indicated      Lung Cancer Screening:     General: Screening Not Indicated      Hepatitis C Screening:    General: Screening Current    Screening, Brief Intervention, and Referral to Treatment (SBIRT)    Screening  Typical number of drinks in a day: 0  Typical number of drinks in a week: "0  Interpretation: Low risk drinking behavior  Single Item Drug Screening:  How often have you used an illegal drug (including marijuana) or a prescription medication for non-medical reasons in the past year? never    Single Item Drug Screen Score: 0  Interpretation: Negative screen for possible drug use disorder    Brief Intervention  Alcohol & drug use screenings were reviewed  No concerns regarding substance use disorder identified  Other Counseling Topics:   Calcium and vitamin D intake and regular weightbearing exercise  No results found  Physical Exam:     /70 (BP Location: Left arm, Patient Position: Sitting, Cuff Size: Large)   Pulse 78   Temp (!) 97 2 °F (36 2 °C)   Resp 16   Ht 4' 11\" (1 499 m)   Wt 79 7 kg (175 lb 9 6 oz)   SpO2 95%   BMI 35 47 kg/m²     Physical Exam  Constitutional:       Appearance: She is well-developed  HENT:      Head: Normocephalic and atraumatic  Eyes:      General: No scleral icterus  Conjunctiva/sclera: Conjunctivae normal       Pupils: Pupils are equal, round, and reactive to light  Cardiovascular:      Rate and Rhythm: Normal rate and regular rhythm  Heart sounds: Normal heart sounds  No murmur heard  No friction rub  No gallop  Pulmonary:      Effort: Pulmonary effort is normal  No respiratory distress  Breath sounds: No wheezing or rales  Chest:      Chest wall: No tenderness  Abdominal:      General: Bowel sounds are normal  There is no distension  Palpations: Abdomen is soft  There is no mass  Tenderness: There is no abdominal tenderness  Musculoskeletal:         General: Normal range of motion  Cervical back: Normal range of motion  Skin:     General: Skin is warm and dry  Findings: No rash  Neurological:      Mental Status: She is alert and oriented to person, place, and time  Cranial Nerves: No cranial nerve deficit            Lai Massey MD  "

## 2023-06-29 DIAGNOSIS — Z86.73 HISTORY OF CVA (CEREBROVASCULAR ACCIDENT): ICD-10-CM

## 2023-06-29 DIAGNOSIS — E78.5 DYSLIPIDEMIA: ICD-10-CM

## 2023-06-29 RX ORDER — SIMVASTATIN 10 MG
TABLET ORAL
Qty: 90 TABLET | Refills: 0 | Status: SHIPPED | OUTPATIENT
Start: 2023-06-29

## 2023-08-10 DIAGNOSIS — J44.9 CHRONIC OBSTRUCTIVE PULMONARY DISEASE, UNSPECIFIED COPD TYPE (HCC): ICD-10-CM

## 2023-08-10 RX ORDER — BUDESONIDE AND FORMOTEROL FUMARATE DIHYDRATE 160; 4.5 UG/1; UG/1
AEROSOL RESPIRATORY (INHALATION)
Qty: 30.6 G | Refills: 0 | Status: SHIPPED | OUTPATIENT
Start: 2023-08-10

## 2023-08-20 PROBLEM — Z00.00 ENCOUNTER FOR ANNUAL WELLNESS EXAM IN MEDICARE PATIENT: Status: RESOLVED | Noted: 2022-05-12 | Resolved: 2023-08-20

## 2023-09-25 DIAGNOSIS — E78.5 DYSLIPIDEMIA: ICD-10-CM

## 2023-09-25 DIAGNOSIS — Z86.73 HISTORY OF CVA (CEREBROVASCULAR ACCIDENT): ICD-10-CM

## 2023-09-26 RX ORDER — SIMVASTATIN 10 MG
TABLET ORAL
Qty: 90 TABLET | Refills: 0 | Status: SHIPPED | OUTPATIENT
Start: 2023-09-26

## 2023-09-28 DIAGNOSIS — J44.9 CHRONIC OBSTRUCTIVE PULMONARY DISEASE, UNSPECIFIED COPD TYPE (HCC): ICD-10-CM

## 2023-09-28 RX ORDER — ALBUTEROL SULFATE 90 UG/1
AEROSOL, METERED RESPIRATORY (INHALATION)
Qty: 8.5 G | Refills: 0 | Status: SHIPPED | OUTPATIENT
Start: 2023-09-28

## 2023-09-29 DIAGNOSIS — I10 ESSENTIAL HYPERTENSION: ICD-10-CM

## 2023-09-29 RX ORDER — HYDROCHLOROTHIAZIDE 12.5 MG/1
12.5 TABLET ORAL DAILY
Qty: 90 TABLET | Refills: 0 | Status: SHIPPED | OUTPATIENT
Start: 2023-09-29

## 2023-10-20 DIAGNOSIS — J44.9 CHRONIC OBSTRUCTIVE PULMONARY DISEASE, UNSPECIFIED COPD TYPE (HCC): ICD-10-CM

## 2023-10-20 RX ORDER — ALBUTEROL SULFATE 90 UG/1
AEROSOL, METERED RESPIRATORY (INHALATION)
Qty: 8.5 G | Refills: 0 | Status: SHIPPED | OUTPATIENT
Start: 2023-10-20

## 2023-11-02 DIAGNOSIS — J44.9 CHRONIC OBSTRUCTIVE PULMONARY DISEASE, UNSPECIFIED COPD TYPE (HCC): ICD-10-CM

## 2023-11-02 RX ORDER — BUDESONIDE AND FORMOTEROL FUMARATE DIHYDRATE 160; 4.5 UG/1; UG/1
AEROSOL RESPIRATORY (INHALATION)
Qty: 30.6 G | Refills: 0 | Status: SHIPPED | OUTPATIENT
Start: 2023-11-02

## 2023-12-24 DIAGNOSIS — I10 ESSENTIAL HYPERTENSION: ICD-10-CM

## 2023-12-27 RX ORDER — HYDROCHLOROTHIAZIDE 12.5 MG/1
12.5 TABLET ORAL DAILY
Qty: 90 TABLET | Refills: 0 | Status: SHIPPED | OUTPATIENT
Start: 2023-12-27

## 2024-01-10 DIAGNOSIS — R42 VERTIGO: ICD-10-CM

## 2024-01-10 DIAGNOSIS — E78.5 DYSLIPIDEMIA: ICD-10-CM

## 2024-01-10 DIAGNOSIS — J44.9 CHRONIC OBSTRUCTIVE PULMONARY DISEASE, UNSPECIFIED COPD TYPE (HCC): ICD-10-CM

## 2024-01-10 DIAGNOSIS — Z86.73 HISTORY OF CVA (CEREBROVASCULAR ACCIDENT): ICD-10-CM

## 2024-01-10 DIAGNOSIS — J30.9 ALLERGIC RHINITIS, UNSPECIFIED SEASONALITY, UNSPECIFIED TRIGGER: ICD-10-CM

## 2024-01-10 RX ORDER — FLUTICASONE PROPIONATE 50 MCG
1 SPRAY, SUSPENSION (ML) NASAL DAILY
Qty: 16 G | Refills: 5 | Status: SHIPPED | OUTPATIENT
Start: 2024-01-10

## 2024-01-10 RX ORDER — MECLIZINE HCL 12.5 MG/1
12.5 TABLET ORAL 3 TIMES DAILY PRN
Qty: 60 TABLET | Refills: 2 | Status: SHIPPED | OUTPATIENT
Start: 2024-01-10

## 2024-01-10 RX ORDER — BUDESONIDE AND FORMOTEROL FUMARATE DIHYDRATE 160; 4.5 UG/1; UG/1
AEROSOL RESPIRATORY (INHALATION)
Qty: 30.6 G | Refills: 3 | Status: SHIPPED | OUTPATIENT
Start: 2024-01-10

## 2024-01-10 RX ORDER — SIMVASTATIN 10 MG
10 TABLET ORAL
Qty: 90 TABLET | Refills: 3 | Status: SHIPPED | OUTPATIENT
Start: 2024-01-10

## 2024-01-12 ENCOUNTER — HOSPITAL ENCOUNTER (EMERGENCY)
Facility: HOSPITAL | Age: 79
Discharge: HOME | End: 2024-01-12
Attending: EMERGENCY MEDICINE
Payer: COMMERCIAL

## 2024-01-12 ENCOUNTER — APPOINTMENT (EMERGENCY)
Dept: RADIOLOGY | Facility: HOSPITAL | Age: 79
End: 2024-01-12
Payer: COMMERCIAL

## 2024-01-12 VITALS
DIASTOLIC BLOOD PRESSURE: 69 MMHG | RESPIRATION RATE: 17 BRPM | TEMPERATURE: 98 F | SYSTOLIC BLOOD PRESSURE: 156 MMHG | HEART RATE: 73 BPM | OXYGEN SATURATION: 96 %

## 2024-01-12 DIAGNOSIS — R10.9 LEFT FLANK PAIN: Primary | ICD-10-CM

## 2024-01-12 LAB
ALBUMIN SERPL BCP-MCNC: 3.9 G/DL (ref 3.5–5)
ALP SERPL-CCNC: 87 U/L (ref 34–104)
ALT SERPL W P-5'-P-CCNC: 16 U/L (ref 7–52)
AMORPH URATE CRY URNS QL MICRO: ABNORMAL
ANION GAP SERPL CALCULATED.3IONS-SCNC: 10 MMOL/L
AST SERPL W P-5'-P-CCNC: 26 U/L (ref 13–39)
BACTERIA UR QL AUTO: ABNORMAL /HPF
BASOPHILS # BLD AUTO: 0.05 THOUSANDS/ÂΜL (ref 0–0.1)
BASOPHILS NFR BLD AUTO: 1 % (ref 0–1)
BILIRUB SERPL-MCNC: 0.36 MG/DL (ref 0.2–1)
BILIRUB UR QL STRIP: NEGATIVE
BUN SERPL-MCNC: 17 MG/DL (ref 5–25)
CALCIUM SERPL-MCNC: 9.2 MG/DL (ref 8.4–10.2)
CHLORIDE SERPL-SCNC: 103 MMOL/L (ref 96–108)
CLARITY UR: CLEAR
CO2 SERPL-SCNC: 24 MMOL/L (ref 21–32)
COLOR UR: YELLOW
CREAT SERPL-MCNC: 0.7 MG/DL (ref 0.6–1.3)
EOSINOPHIL # BLD AUTO: 0.1 THOUSAND/ÂΜL (ref 0–0.61)
EOSINOPHIL NFR BLD AUTO: 1 % (ref 0–6)
ERYTHROCYTE [DISTWIDTH] IN BLOOD BY AUTOMATED COUNT: 14.8 % (ref 11.6–15.1)
GFR SERPL CREATININE-BSD FRML MDRD: 83 ML/MIN/1.73SQ M
GLUCOSE SERPL-MCNC: 80 MG/DL (ref 65–140)
GLUCOSE UR STRIP-MCNC: NEGATIVE MG/DL
HCT VFR BLD AUTO: 44.9 % (ref 34.8–46.1)
HGB BLD-MCNC: 14.4 G/DL (ref 11.5–15.4)
HGB UR QL STRIP.AUTO: NEGATIVE
IMM GRANULOCYTES # BLD AUTO: 0.07 THOUSAND/UL (ref 0–0.2)
IMM GRANULOCYTES NFR BLD AUTO: 1 % (ref 0–2)
KETONES UR STRIP-MCNC: NEGATIVE MG/DL
LEUKOCYTE ESTERASE UR QL STRIP: ABNORMAL
LIPASE SERPL-CCNC: 15 U/L (ref 11–82)
LYMPHOCYTES # BLD AUTO: 2.5 THOUSANDS/ÂΜL (ref 0.6–4.47)
LYMPHOCYTES NFR BLD AUTO: 24 % (ref 14–44)
MCH RBC QN AUTO: 29.6 PG (ref 26.8–34.3)
MCHC RBC AUTO-ENTMCNC: 32.1 G/DL (ref 31.4–37.4)
MCV RBC AUTO: 92 FL (ref 82–98)
MONOCYTES # BLD AUTO: 1.1 THOUSAND/ÂΜL (ref 0.17–1.22)
MONOCYTES NFR BLD AUTO: 11 % (ref 4–12)
NEUTROPHILS # BLD AUTO: 6.47 THOUSANDS/ÂΜL (ref 1.85–7.62)
NEUTS SEG NFR BLD AUTO: 62 % (ref 43–75)
NITRITE UR QL STRIP: NEGATIVE
NON-SQ EPI CELLS URNS QL MICRO: ABNORMAL /HPF
NRBC BLD AUTO-RTO: 0 /100 WBCS
PH UR STRIP.AUTO: 6.5 [PH]
PLATELET # BLD AUTO: 306 THOUSANDS/UL (ref 149–390)
PMV BLD AUTO: 9.9 FL (ref 8.9–12.7)
POTASSIUM SERPL-SCNC: 3.9 MMOL/L (ref 3.5–5.3)
PROT SERPL-MCNC: 7.2 G/DL (ref 6.4–8.4)
PROT UR STRIP-MCNC: ABNORMAL MG/DL
RBC # BLD AUTO: 4.87 MILLION/UL (ref 3.81–5.12)
RBC #/AREA URNS AUTO: ABNORMAL /HPF
SODIUM SERPL-SCNC: 137 MMOL/L (ref 135–147)
SP GR UR STRIP.AUTO: 1.02 (ref 1–1.03)
UROBILINOGEN UR STRIP-ACNC: <2 MG/DL
WBC # BLD AUTO: 10.29 THOUSAND/UL (ref 4.31–10.16)
WBC #/AREA URNS AUTO: ABNORMAL /HPF

## 2024-01-12 PROCEDURE — 80053 COMPREHEN METABOLIC PANEL: CPT | Performed by: EMERGENCY MEDICINE

## 2024-01-12 PROCEDURE — 96360 HYDRATION IV INFUSION INIT: CPT

## 2024-01-12 PROCEDURE — 87086 URINE CULTURE/COLONY COUNT: CPT | Performed by: STUDENT IN AN ORGANIZED HEALTH CARE EDUCATION/TRAINING PROGRAM

## 2024-01-12 PROCEDURE — G1004 CDSM NDSC: HCPCS

## 2024-01-12 PROCEDURE — 83690 ASSAY OF LIPASE: CPT | Performed by: EMERGENCY MEDICINE

## 2024-01-12 PROCEDURE — 99285 EMERGENCY DEPT VISIT HI MDM: CPT | Performed by: EMERGENCY MEDICINE

## 2024-01-12 PROCEDURE — 81001 URINALYSIS AUTO W/SCOPE: CPT | Performed by: STUDENT IN AN ORGANIZED HEALTH CARE EDUCATION/TRAINING PROGRAM

## 2024-01-12 PROCEDURE — 85025 COMPLETE CBC W/AUTO DIFF WBC: CPT | Performed by: EMERGENCY MEDICINE

## 2024-01-12 PROCEDURE — 36415 COLL VENOUS BLD VENIPUNCTURE: CPT

## 2024-01-12 PROCEDURE — 99285 EMERGENCY DEPT VISIT HI MDM: CPT

## 2024-01-12 PROCEDURE — 74177 CT ABD & PELVIS W/CONTRAST: CPT

## 2024-01-12 RX ORDER — MAGNESIUM HYDROXIDE/ALUMINUM HYDROXICE/SIMETHICONE 120; 1200; 1200 MG/30ML; MG/30ML; MG/30ML
30 SUSPENSION ORAL ONCE
Status: COMPLETED | OUTPATIENT
Start: 2024-01-12 | End: 2024-01-12

## 2024-01-12 RX ORDER — SIMETHICONE 180 MG
180 CAPSULE ORAL EVERY 6 HOURS PRN
Qty: 30 CAPSULE | Refills: 0 | Status: SHIPPED | OUTPATIENT
Start: 2024-01-12

## 2024-01-12 RX ADMIN — ALUMINUM HYDROXIDE, MAGNESIUM HYDROXIDE, AND SIMETHICONE 30 ML: 200; 200; 20 SUSPENSION ORAL at 14:03

## 2024-01-12 RX ADMIN — IOHEXOL 100 ML: 350 INJECTION, SOLUTION INTRAVENOUS at 13:50

## 2024-01-12 RX ADMIN — SODIUM CHLORIDE 1000 ML: 0.9 INJECTION, SOLUTION INTRAVENOUS at 14:04

## 2024-01-12 NOTE — ED PROVIDER NOTES
History  Chief Complaint   Patient presents with    Flank Pain     Pt c/o L sided flank pain x 2 weeks radiating to L lower abdomen occasionally, reports urinary frequency, + nausea, -v/d     78-year-old female presents emergency department today for 3 week history of intermittent left flank pain.  She states 3 weeks ago she felt a very sharp pain in that area.  It resolved spontaneously within a few hours.  Today again she noted to have that pain which is much less severe but came in for an evaluation.  She denied any nausea or vomiting associated with it.  She has no other abdominal pain or change in bowel or bladder habits.  She did have a UTI 2 months ago and was given antibiotics for that.  She denies hematuria.  No chest pain or shortness of breath.  Denies trauma or history of rib fractures.  No abdominal surgeries in the past.  She has not take anything for the pain.  She also disclosed that she has been having a flatulence for a few weeks now.  No bloody or discolored stools, no diarrhea.        Prior to Admission Medications   Prescriptions Last Dose Informant Patient Reported? Taking?   Blood Pressure Monitoring (BLOOD PRESSURE CUFF) MISC  Self No No   Sig: by Does not apply route daily   Melatonin 5 MG TABS  Self No No   Sig: Take 1 tablet (5 mg total) by mouth daily at bedtime as needed (sleep issue)   Oyster Shell 500 MG TABS  Self No No   Sig: Take 2 tablets (1,000 mg total) by mouth daily   Prolensa 0.07 % SOLN   Yes No   Patient not taking: Reported on 4/20/2023   albuterol (PROVENTIL HFA,VENTOLIN HFA) 90 mcg/act inhaler   No No   Sig: INHALE 2 PUFFS BY MOUTH EVERY 6 HOURS AS NEEDED FOR WHEEZING OR SHORTNESS OF BREATH   aspirin (ECOTRIN LOW STRENGTH) 81 mg EC tablet  Self No No   Sig: Take 1 tablet (81 mg total) by mouth daily   budesonide-formoterol (Symbicort) 160-4.5 mcg/act inhaler   No No   Sig: Rinse mouth after use.   fluocinonide (LIDEX) 0.05 % external solution   No No   Sig: Apply  topically 2 (two) times a day   fluticasone (FLONASE) 50 mcg/act nasal spray   No No   Si spray into each nostril daily   hydrochlorothiazide (HYDRODIURIL) 12.5 mg tablet   No No   Sig: TAKE ONE TABLET BY MOUTH ONE TIME DAILY   loratadine (CLARITIN) 10 mg tablet  Self No No   Sig: Take 1 tablet (10 mg total) by mouth in the morning.   meclizine (ANTIVERT) 12.5 MG tablet   No No   Sig: Take 1 tablet (12.5 mg total) by mouth every 12 (twelve) hours as needed for dizziness   meclizine (ANTIVERT) 12.5 MG tablet   No No   Sig: Take 1 tablet (12.5 mg total) by mouth 3 (three) times a day as needed for dizziness   ofloxacin (OCUFLOX) 0.3 % ophthalmic solution   Yes No   Patient not taking: Reported on 2023   olopatadine HCl (PATADAY) 0.2 % opth drops  Self No No   Sig: Administer 1 drop to both eyes as needed in the morning and 1 drop as needed in the evening (allergies).   Patient not taking: Reported on 2022   prednisoLONE acetate (PRED FORTE) 1 % ophthalmic suspension   Yes No   Patient not taking: Reported on 2023   simvastatin (ZOCOR) 10 mg tablet   No No   Sig: Take 1 tablet (10 mg total) by mouth daily at bedtime      Facility-Administered Medications: None       Past Medical History:   Diagnosis Date    COPD (chronic obstructive pulmonary disease) ([facility])     Fracture     rt leg    H/O: hysterectomy     Shingles     Stroke ([facility])        Past Surgical History:   Procedure Laterality Date    HYSTERECTOMY         Family History   Problem Relation Age of Onset    Hyperlipidemia Mother     Hypertension Father     Cancer Sister     Diabetes Family      I have reviewed and agree with the history as documented.    E-Cigarette/Vaping    E-Cigarette Use Never User      E-Cigarette/Vaping Substances     Social History     Tobacco Use    Smoking status: Former     Current packs/day: 0.00     Average packs/day: 0.5 packs/day for 40.0 years (20.0 ttl pk-yrs)     Types: Cigarettes     Start date:  1971     Quit date:      Years since quittin.0    Smokeless tobacco: Never   Vaping Use    Vaping status: Never Used   Substance Use Topics    Alcohol use: No    Drug use: No        Review of Systems   Constitutional:  Negative for activity change, appetite change, chills, fatigue and fever.   Eyes:  Negative for visual disturbance.   Respiratory:  Negative for shortness of breath.    Cardiovascular:  Negative for chest pain.   Gastrointestinal:  Positive for abdominal pain. Negative for abdominal distention, blood in stool, constipation, diarrhea, nausea and vomiting.   Genitourinary:  Positive for flank pain. Negative for dysuria, hematuria and urgency.   Musculoskeletal:  Negative for myalgias.   Skin:  Negative for rash.   Neurological:  Negative for dizziness, tremors, syncope, weakness, light-headedness and headaches.   Hematological:  Does not bruise/bleed easily.   Psychiatric/Behavioral:  Negative for agitation and confusion.        Physical Exam  ED Triage Vitals [24 1152]   Temperature Pulse Respirations Blood Pressure SpO2   98 °F (36.7 °C) 93 18 (!) 186/86 98 %      Temp Source Heart Rate Source Patient Position - Orthostatic VS BP Location FiO2 (%)   Oral Monitor Sitting Left arm --      Pain Score       8             Orthostatic Vital Signs  Vitals:    24 1152 24 1424   BP: (!) 186/86 156/69   Pulse: 93 73   Patient Position - Orthostatic VS: Sitting Sitting       Physical Exam  Vitals reviewed.   Constitutional:       General: She is not in acute distress.     Appearance: Normal appearance. She is not ill-appearing.   HENT:      Head: Normocephalic and atraumatic.   Eyes:      Pupils: Pupils are equal, round, and reactive to light.   Cardiovascular:      Rate and Rhythm: Normal rate and regular rhythm.      Pulses: Normal pulses.      Heart sounds: Normal heart sounds. No murmur heard.  Abdominal:      General: Abdomen is flat.      Palpations: Abdomen is soft.       Tenderness: There is no abdominal tenderness. There is left CVA tenderness. There is no right CVA tenderness, guarding or rebound.   Skin:     General: Skin is warm and dry.      Findings: No rash.   Neurological:      Mental Status: She is alert and oriented to person, place, and time.   Psychiatric:         Mood and Affect: Mood normal.         Behavior: Behavior normal.         ED Medications  Medications   aluminum-magnesium hydroxide-simethicone (MAALOX) oral suspension 30 mL (30 mL Oral Given 1/12/24 1403)   sodium chloride 0.9 % bolus 1,000 mL (0 mL Intravenous Stopped 1/12/24 1500)   iohexol (OMNIPAQUE) 350 MG/ML injection (MULTI-DOSE) 100 mL (100 mL Intravenous Given 1/12/24 1350)       Diagnostic Studies  Results Reviewed       Procedure Component Value Units Date/Time    Urine Microscopic [[id number]]  (Abnormal) Collected: 01/12/24 1449    Lab Status: Final result Specimen: Urine, Clean Catch Updated: 01/12/24 1510     RBC, UA 4-10 /hpf      WBC, UA 10-20 /hpf      Epithelial Cells Occasional /hpf      Bacteria, UA None Seen /hpf      Amorphous Crystals, UA Occasional    Urine culture [[id number]] Collected: 01/12/24 1449    Lab Status: In process Specimen: Urine, Clean Catch Updated: 01/12/24 1510    UA w Reflex to Microscopic w Reflex to Culture [[id number]]  (Abnormal) Collected: 01/12/24 1449    Lab Status: Final result Specimen: Urine, Clean Catch Updated: 01/12/24 1506     Color, UA Yellow     Clarity, UA Clear     Specific Gravity, UA 1.020     pH, UA 6.5     Leukocytes, UA Large     Nitrite, UA Negative     Protein, UA Trace mg/dl      Glucose, UA Negative mg/dl      Ketones, UA Negative mg/dl      Urobilinogen, UA <2.0 mg/dl      Bilirubin, UA Negative     Occult Blood, UA Negative    Lipase [[id number]]  (Normal) Collected: 01/12/24 1157    Lab Status: Final result Specimen: Blood from Arm, Left Updated: 01/12/24 1236     Lipase 15 u/L     Comprehensive metabolic panel [[id number]] Collected:  01/12/24 1157    Lab Status: Final result Specimen: Blood from Arm, Left Updated: 01/12/24 1236     Sodium 137 mmol/L      Potassium 3.9 mmol/L      Chloride 103 mmol/L      CO2 24 mmol/L      ANION GAP 10 mmol/L      BUN 17 mg/dL      Creatinine 0.70 mg/dL      Glucose 80 mg/dL      Calcium 9.2 mg/dL      AST 26 U/L      ALT 16 U/L      Alkaline Phosphatase 87 U/L      Total Protein 7.2 g/dL      Albumin 3.9 g/dL      Total Bilirubin 0.36 mg/dL      eGFR 83 ml/min/1.73sq m     Narrative:      National Kidney Disease Foundation guidelines for Chronic Kidney Disease (CKD):     Stage 1 with normal or high GFR (GFR > 90 mL/min/1.73 square meters)    Stage 2 Mild CKD (GFR = 60-89 mL/min/1.73 square meters)    Stage 3A Moderate CKD (GFR = 45-59 mL/min/1.73 square meters)    Stage 3B Moderate CKD (GFR = 30-44 mL/min/1.73 square meters)    Stage 4 Severe CKD (GFR = 15-29 mL/min/1.73 square meters)    Stage 5 End Stage CKD (GFR <15 mL/min/1.73 square meters)  Note: GFR calculation is accurate only with a steady state creatinine    CBC and differential [[id number]]  (Abnormal) Collected: 01/12/24 1157    Lab Status: Final result Specimen: Blood from Arm, Left Updated: 01/12/24 1219     WBC 10.29 Thousand/uL      RBC 4.87 Million/uL      Hemoglobin 14.4 g/dL      Hematocrit 44.9 %      MCV 92 fL      MCH 29.6 pg      MCHC 32.1 g/dL      RDW 14.8 %      MPV 9.9 fL      Platelets 306 Thousands/uL      nRBC 0 /100 WBCs      Neutrophils Relative 62 %      Immat GRANS % 1 %      Lymphocytes Relative 24 %      Monocytes Relative 11 %      Eosinophils Relative 1 %      Basophils Relative 1 %      Neutrophils Absolute 6.47 Thousands/µL      Immature Grans Absolute 0.07 Thousand/uL      Lymphocytes Absolute 2.50 Thousands/µL      Monocytes Absolute 1.10 Thousand/µL      Eosinophils Absolute 0.10 Thousand/µL      Basophils Absolute 0.05 Thousands/µL                    CT abdomen pelvis with contrast   Final Result by Koby Joel MD  (01/12 7841)      No acute inflammatory process identified in the abdomen or pelvis.            Workstation performed: [id number]               Procedures  Procedures      ED Course                                       Medical Decision Making  78-year-old female presents emergency department chief complaint of intermittent left flank pain and increased flatulence for the past 3 to 4 weeks.  No history of renal stones.  Afebrile.  On examination patient appears comfortable sitting upright in bed.  She does have some left flank pain with some of that pain rating to the left upper quadrant.  Given presentation we will obtain some labs to include CBC CMP and UA.  CT scan of the abdomen ordered as renal stone is considered on the differential diagnosis.  Labs were not indicative of any infectious process.  Some leukocytes noted in the urine but no bacteria were visualized, discussed with patient that we should wait for culture to come back before initiate treatment to which she agreed.  CT scan results reviewed the patient: No renal stone, no intra-abdominal pathology to explain her symptoms.  Did have some increased bowel gas.  Her pain may be related to gas versus MSK.  Simethicone prescription called into the pharmacy and first dose was given here in the emergency department.  Advised her to follow-up with her primary care physician with consideration of GI referral for pain continues.  Patient remained hemodynamically stable in the emergency department, and is appropriate for discharge home with clear follow-up instructions.    Amount and/or Complexity of Data Reviewed  Labs: ordered.  Radiology: ordered.    Risk  OTC drugs.  Prescription drug management.          Disposition  Final diagnoses:   Left flank pain     Time reflects when diagnosis was documented in both MDM as applicable and the Disposition within this note       Time User Action Codes Description Comment    1/12/2024  3:13 PM Messi Brown Add [R10.9]  Left flank pain           ED Disposition       ED Disposition   Discharge    Condition   Stable    Date/Time   Fri Jan 12, 2024  3:14 PM    Comment   Jigna More discharge to home/self care.                   Follow-up Information       Follow up With Specialties Details Why Contact Info    Nancy Calvillo MD Family Medicine Schedule an appointment as soon as possible for a visit in 3 days  [address] [address]  [place] [address]  [phone]              Discharge Medication List as of 1/12/2024  3:53 PM        START taking these medications    Details   simethicone (MYLICON,GAS-X) 180 MG capsule Take 1 capsule (180 mg total) by mouth every 6 (six) hours as needed for flatulence, Starting Fri 1/12/2024, Normal           CONTINUE these medications which have NOT CHANGED    Details   albuterol (PROVENTIL HFA,VENTOLIN HFA) 90 mcg/act inhaler INHALE 2 PUFFS BY MOUTH EVERY 6 HOURS AS NEEDED FOR WHEEZING OR SHORTNESS OF BREATH, Normal      aspirin (ECOTRIN LOW STRENGTH) 81 mg EC tablet Take 1 tablet (81 mg total) by mouth daily, Starting Thu 11/4/2021, Normal      Blood Pressure Monitoring (BLOOD PRESSURE CUFF) MISC by Does not apply route daily, Starting Fri 3/13/2020, Print      budesonide-formoterol (Symbicort) 160-4.5 mcg/act inhaler Rinse mouth after use., Normal      fluocinonide (LIDEX) 0.05 % external solution Apply topically 2 (two) times a day, Starting Wed 6/21/2023, Normal      fluticasone (FLONASE) 50 mcg/act nasal spray 1 spray into each nostril daily, Starting Wed 1/10/2024, Normal      hydrochlorothiazide (HYDRODIURIL) 12.5 mg tablet TAKE ONE TABLET BY MOUTH ONE TIME DAILY, Starting Wed 12/27/2023, Normal      loratadine (CLARITIN) 10 mg tablet Take 1 tablet (10 mg total) by mouth in the morning., Starting Thu 5/12/2022, Normal      !! meclizine (ANTIVERT) 12.5 MG tablet Take 1 tablet (12.5 mg total) by mouth every 12 (twelve) hours as needed for dizziness, Starting Wed  1/25/2023, Normal      !! meclizine (ANTIVERT) 12.5 MG tablet Take 1 tablet (12.5 mg total) by mouth 3 (three) times a day as needed for dizziness, Starting Wed 1/10/2024, Normal      Melatonin 5 MG TABS Take 1 tablet (5 mg total) by mouth daily at bedtime as needed (sleep issue), Starting Fri 12/14/2018, Normal      ofloxacin (OCUFLOX) 0.3 % ophthalmic solution Starting Thu 2/2/2023, Historical Med      olopatadine HCl (PATADAY) 0.2 % opth drops Administer 1 drop to both eyes as needed in the morning and 1 drop as needed in the evening (allergies)., Starting Thu 5/12/2022, Normal      Oyster Shell 500 MG TABS Take 2 tablets (1,000 mg total) by mouth daily, Starting Wed 7/8/2020, Until Tue 10/6/2020, Normal      prednisoLONE acetate (PRED FORTE) 1 % ophthalmic suspension Starting Thu 2/2/2023, Historical Med      Prolensa 0.07 % SOLN Starting Fri 2/3/2023, Historical Med      simvastatin (ZOCOR) 10 mg tablet Take 1 tablet (10 mg total) by mouth daily at bedtime, Starting Wed 1/10/2024, Normal       !! - Potential duplicate medications found. Please discuss with provider.        No discharge procedures on file.    PDMP Review         Value Time User    PDMP Reviewed  Yes 5/21/2020 11:12 AM Nancy Calvillo MD             ED Provider  Attending physically available and evaluated Jigna More. I managed the patient along with the ED Attending.    Electronically Signed by           Messi Brown MD  01/12/24 4669

## 2024-01-13 LAB — BACTERIA UR CULT: NORMAL

## 2024-01-13 NOTE — ED ATTENDING ATTESTATION
1/12/2024   ILuz MD, saw and evaluated the patient. I have discussed the patient with the resident/non-physician practitioner and agree with the resident's/non-physician practitioner's findings, Plan of Care, and MDM as documented in the resident's/non-physician practitioner's note, except where noted. All available labs and Radiology studies were reviewed.  I was present for key portions of any procedure(s) performed by the resident/non-physician practitioner and I was immediately available to provide assistance.       At this point I agree with the current assessment done in the Emergency Department.  I have conducted an independent evaluation of this patient a history and physical is as follows:    Unit/Bed#: ED 03 Encounter: [id number]    Chief Complaint   Patient presents with    Flank Pain     Pt c/o L sided flank pain x 2 weeks radiating to L lower abdomen occasionally, reports urinary frequency, + nausea, -v/d             Physical Exam  ED Triage Vitals [01/12/24 1152]   Temperature Pulse Respirations Blood Pressure SpO2   98 °F (36.7 °C) 93 18 (!) 186/86 98 %      Temp Source Heart Rate Source Patient Position - Orthostatic VS BP Location FiO2 (%)   Oral Monitor Sitting Left arm --      Pain Score       8           Vital signs and nursing notes reviewed    ** IF YOU ARE READING THIS, THE EXAM TEMPLATE BELOW HAS NOT BEEN UPDATED**    CONSTITUTIONAL: female appearing stated age resting in bed, in no acute distress  HEENT: atraumatic, normocephalic. Sclera anicteric, conjunctiva are not injected. Moist oral mucosa  CARDIOVASCULAR/CHEST: RRR, no M/R/G. 2+ radial pulses  PULMONARY: Breathing comfortably on RA. Breath sounds are equal and clear to auscultation  ABDOMEN: non-distended. BS present, normoactive. Non-tender  MSK: moves all extremities, no deformities, no peripheral edema, no calf asymmetry  NEURO: Awake, alert, and oriented x 3. Face symmetric. Moves all extremities spontaneously. No  focal neurologic deficits  SKIN: Warm, appears well-perfused  MENTAL STATUS: Normal affect      Labs and Imaging  Labs Reviewed   CBC AND DIFFERENTIAL - Abnormal       Result Value Ref Range Status    WBC 10.29 (*) 4.31 - 10.16 Thousand/uL Final    RBC 4.87  3.81 - 5.12 Million/uL Final    Hemoglobin 14.4  11.5 - 15.4 g/dL Final    Hematocrit 44.9  34.8 - 46.1 % Final    MCV 92  82 - 98 fL Final    MCH 29.6  26.8 - 34.3 pg Final    MCHC 32.1  31.4 - 37.4 g/dL Final    RDW 14.8  11.6 - 15.1 % Final    MPV 9.9  8.9 - 12.7 fL Final    Platelets 306  149 - 390 Thousands/uL Final    nRBC 0  /100 WBCs Final    Neutrophils Relative 62  43 - 75 % Final    Immat GRANS % 1  0 - 2 % Final    Lymphocytes Relative 24  14 - 44 % Final    Monocytes Relative 11  4 - 12 % Final    Eosinophils Relative 1  0 - 6 % Final    Basophils Relative 1  0 - 1 % Final    Neutrophils Absolute 6.47  1.85 - 7.62 Thousands/µL Final    Immature Grans Absolute 0.07  0.00 - 0.20 Thousand/uL Final    Lymphocytes Absolute 2.50  0.60 - 4.47 Thousands/µL Final    Monocytes Absolute 1.10  0.17 - 1.22 Thousand/µL Final    Eosinophils Absolute 0.10  0.00 - 0.61 Thousand/µL Final    Basophils Absolute 0.05  0.00 - 0.10 Thousands/µL Final   UA W REFLEX TO MICROSCOPIC WITH REFLEX TO CULTURE - Abnormal    Color, UA Yellow   Final    Clarity, UA Clear   Final    Specific Gravity, UA 1.020  1.003 - 1.030 Final    pH, UA 6.5  4.5, 5.0, 5.5, 6.0, 6.5, 7.0, 7.5, 8.0 Final    Leukocytes, UA Large (*) Negative Final    Nitrite, UA Negative  Negative Final    Protein, UA Trace (*) Negative mg/dl Final    Glucose, UA Negative  Negative mg/dl Final    Ketones, UA Negative  Negative mg/dl Final    Urobilinogen, UA <2.0  <2.0 mg/dl mg/dl Final    Bilirubin, UA Negative  Negative Final    Occult Blood, UA Negative  Negative Final   URINE MICROSCOPIC - Abnormal    RBC, UA 4-10 (*) None Seen, 1-2 /hpf Final    WBC, UA 10-20 (*) None Seen, 1-2 /hpf Final    Epithelial Cells  Occasional  None Seen, Occasional /hpf Final    Bacteria, UA None Seen  None Seen, Occasional /hpf Final    Amorphous Crystals, UA Occasional   Final   LIPASE - Normal    Lipase 15  11 - 82 u/L Final   URINE CULTURE   COMPREHENSIVE METABOLIC PANEL    Sodium 137  135 - 147 mmol/L Final    Potassium 3.9  3.5 - 5.3 mmol/L Final    Chloride 103  96 - 108 mmol/L Final    CO2 24  21 - 32 mmol/L Final    ANION GAP 10  mmol/L Final    BUN 17  5 - 25 mg/dL Final    Creatinine 0.70  0.60 - 1.30 mg/dL Final    Comment: Standardized to IDMS reference method    Glucose 80  65 - 140 mg/dL Final    Comment: If the patient is fasting, the ADA then defines impaired fasting glucose as > 100 mg/dL and diabetes as > or equal to 123 mg/dL.    Calcium 9.2  8.4 - 10.2 mg/dL Final    AST 26  13 - 39 U/L Final    ALT 16  7 - 52 U/L Final    Comment: Specimen collection should occur prior to Sulfasalazine administration due to the potential for falsely depressed results.     Alkaline Phosphatase 87  34 - 104 U/L Final    Total Protein 7.2  6.4 - 8.4 g/dL Final    Albumin 3.9  3.5 - 5.0 g/dL Final    Total Bilirubin 0.36  0.20 - 1.00 mg/dL Final    Comment: Use of this assay is not recommended for patients undergoing treatment with eltrombopag due to the potential for falsely elevated results.  N-acetyl-p-benzoquinone imine (metabolite of Acetaminophen) will generate erroneously low results in samples for patients that have taken an overdose of Acetaminophen.    eGFR 83  ml/min/1.73sq m Final    Narrative:     National Kidney Disease Foundation guidelines for Chronic Kidney Disease (CKD):     Stage 1 with normal or high GFR (GFR > 90 mL/min/1.73 square meters)    Stage 2 Mild CKD (GFR = 60-89 mL/min/1.73 square meters)    Stage 3A Moderate CKD (GFR = 45-59 mL/min/1.73 square meters)    Stage 3B Moderate CKD (GFR = 30-44 mL/min/1.73 square meters)    Stage 4 Severe CKD (GFR = 15-29 mL/min/1.73 square meters)    Stage 5 End Stage CKD (GFR <15  mL/min/1.73 square meters)  Note: GFR calculation is accurate only with a steady state creatinine       CT abdomen pelvis with contrast   Final Result      No acute inflammatory process identified in the abdomen or pelvis.            Workstation performed: [id number]               Procedures  Procedures        ED Course  Medications   aluminum-magnesium hydroxide-simethicone (MAALOX) oral suspension 30 mL (30 mL Oral Given 1/12/24 1403)   sodium chloride 0.9 % bolus 1,000 mL (0 mL Intravenous Stopped 1/12/24 1500)   iohexol (OMNIPAQUE) 350 MG/ML injection (MULTI-DOSE) 100 mL (100 mL Intravenous Given 1/12/24 1350)                 administered for any dyspepsia.  Labs reveal essentially unremarkable CBC, CMP, normal lipase.  UA is with large leukocytes, negative nitrates, no bacteria.  CT imaging of abdomen pelvis with contrast reveals no acute inflammatory process in the abdomen or pelvis.  For now, etiology of symptoms is uncertain but no obvious surgical pathology is present.  Will start the patient on simethicone for any excessive bowel gas and refer to gastroenterology for further workup, particularly if she has any issues with food sensitivity/indigestion and for further evaluation.  Patient discharged to home with recommendations for symptom control, return precautions, and plan for follow up.

## 2024-03-14 ENCOUNTER — APPOINTMENT (OUTPATIENT)
Dept: LAB | Facility: CLINIC | Age: 79
End: 2024-03-14
Payer: COMMERCIAL

## 2024-03-14 DIAGNOSIS — R73.03 PREDIABETES: ICD-10-CM

## 2024-03-14 DIAGNOSIS — I10 ESSENTIAL HYPERTENSION: ICD-10-CM

## 2024-03-14 DIAGNOSIS — E78.5 DYSLIPIDEMIA: ICD-10-CM

## 2024-03-14 LAB
ANION GAP SERPL CALCULATED.3IONS-SCNC: 9 MMOL/L (ref 4–13)
BUN SERPL-MCNC: 17 MG/DL (ref 5–25)
CALCIUM SERPL-MCNC: 8.9 MG/DL (ref 8.4–10.2)
CHLORIDE SERPL-SCNC: 102 MMOL/L (ref 96–108)
CHOLEST SERPL-MCNC: 147 MG/DL
CO2 SERPL-SCNC: 30 MMOL/L (ref 21–32)
CREAT SERPL-MCNC: 0.65 MG/DL (ref 0.6–1.3)
EST. AVERAGE GLUCOSE BLD GHB EST-MCNC: 120 MG/DL
GFR SERPL CREATININE-BSD FRML MDRD: 85 ML/MIN/1.73SQ M
GLUCOSE P FAST SERPL-MCNC: 105 MG/DL (ref 65–99)
HBA1C MFR BLD: 5.8 %
HDLC SERPL-MCNC: 56 MG/DL
LDLC SERPL CALC-MCNC: 69 MG/DL (ref 0–100)
NONHDLC SERPL-MCNC: 91 MG/DL
POTASSIUM SERPL-SCNC: 3.9 MMOL/L (ref 3.5–5.3)
SODIUM SERPL-SCNC: 141 MMOL/L (ref 135–147)
TRIGL SERPL-MCNC: 109 MG/DL

## 2024-03-14 PROCEDURE — 36415 COLL VENOUS BLD VENIPUNCTURE: CPT

## 2024-03-14 PROCEDURE — 83036 HEMOGLOBIN GLYCOSYLATED A1C: CPT

## 2024-03-14 PROCEDURE — 80048 BASIC METABOLIC PNL TOTAL CA: CPT

## 2024-03-14 PROCEDURE — 80061 LIPID PANEL: CPT

## 2024-03-19 ENCOUNTER — OFFICE VISIT (OUTPATIENT)
Dept: FAMILY MEDICINE CLINIC | Facility: CLINIC | Age: 79
End: 2024-03-19
Payer: COMMERCIAL

## 2024-03-19 VITALS
WEIGHT: 174.4 LBS | RESPIRATION RATE: 16 BRPM | OXYGEN SATURATION: 97 % | TEMPERATURE: 97.4 F | HEART RATE: 93 BPM | BODY MASS INDEX: 35.16 KG/M2 | DIASTOLIC BLOOD PRESSURE: 76 MMHG | HEIGHT: 59 IN | SYSTOLIC BLOOD PRESSURE: 163 MMHG

## 2024-03-19 DIAGNOSIS — R73.03 PREDIABETES: ICD-10-CM

## 2024-03-19 DIAGNOSIS — Z86.73 HISTORY OF CVA (CEREBROVASCULAR ACCIDENT): ICD-10-CM

## 2024-03-19 DIAGNOSIS — I10 ESSENTIAL HYPERTENSION: Primary | ICD-10-CM

## 2024-03-19 DIAGNOSIS — E78.5 DYSLIPIDEMIA: ICD-10-CM

## 2024-03-19 DIAGNOSIS — J30.9 ALLERGIC RHINITIS, UNSPECIFIED SEASONALITY, UNSPECIFIED TRIGGER: ICD-10-CM

## 2024-03-19 DIAGNOSIS — Z87.891 EX-SMOKER: ICD-10-CM

## 2024-03-19 DIAGNOSIS — J44.9 CHRONIC OBSTRUCTIVE PULMONARY DISEASE, UNSPECIFIED COPD TYPE (HCC): ICD-10-CM

## 2024-03-19 DIAGNOSIS — I73.9 PAD (PERIPHERAL ARTERY DISEASE) (HCC): ICD-10-CM

## 2024-03-19 DIAGNOSIS — Z23 NEED FOR VACCINATION: ICD-10-CM

## 2024-03-19 PROBLEM — E66.9 OBESITY (BMI 30-39.9): Status: RESOLVED | Noted: 2020-12-01 | Resolved: 2024-03-19

## 2024-03-19 PROBLEM — E66.01 OBESITY, MORBID (HCC): Status: RESOLVED | Noted: 2022-05-12 | Resolved: 2024-03-19

## 2024-03-19 PROCEDURE — G2211 COMPLEX E/M VISIT ADD ON: HCPCS | Performed by: FAMILY MEDICINE

## 2024-03-19 PROCEDURE — 99214 OFFICE O/P EST MOD 30 MIN: CPT | Performed by: FAMILY MEDICINE

## 2024-03-19 RX ORDER — HYDROCHLOROTHIAZIDE 12.5 MG/1
12.5 TABLET ORAL DAILY
Qty: 90 TABLET | Refills: 3 | Status: SHIPPED | OUTPATIENT
Start: 2024-03-19

## 2024-03-19 RX ORDER — ZOSTER VACCINE RECOMBINANT, ADJUVANTED 50 MCG/0.5
0.5 KIT INTRAMUSCULAR ONCE
Qty: 1 EACH | Refills: 1 | Status: SHIPPED | OUTPATIENT
Start: 2024-03-19 | End: 2024-03-19

## 2024-03-19 RX ORDER — BUDESONIDE AND FORMOTEROL FUMARATE DIHYDRATE 160; 4.5 UG/1; UG/1
AEROSOL RESPIRATORY (INHALATION)
Qty: 30.6 G | Refills: 3 | Status: SHIPPED | OUTPATIENT
Start: 2024-03-19

## 2024-03-19 RX ORDER — LORATADINE 10 MG/1
10 TABLET ORAL DAILY
Qty: 90 TABLET | Refills: 0 | Status: SHIPPED | OUTPATIENT
Start: 2024-03-19

## 2024-03-19 RX ORDER — ASPIRIN 81 MG/1
81 TABLET ORAL DAILY
Qty: 90 TABLET | Refills: 3 | Status: SHIPPED | OUTPATIENT
Start: 2024-03-19

## 2024-03-19 RX ORDER — ALBUTEROL SULFATE 90 UG/1
1 AEROSOL, METERED RESPIRATORY (INHALATION) EVERY 6 HOURS PRN
Qty: 8.5 G | Refills: 1 | Status: SHIPPED | OUTPATIENT
Start: 2024-03-19

## 2024-03-19 NOTE — PROGRESS NOTES
Name: Jigna More      : 1945      MRN: [id number]  Encounter Provider: Samina Almaguer MD  Encounter Date: 3/19/2024   Encounter department: [facility]    Assessment & Plan     1. Essential hypertension  -     aspirin (ECOTRIN LOW STRENGTH) 81 mg EC tablet; Take 1 tablet (81 mg total) by mouth daily  -     hydroCHLOROthiazide 12.5 mg tablet; Take 1 tablet (12.5 mg total) by mouth daily  -     CBC and differential; Future  -     Comprehensive metabolic panel; Future  -     TSH, 3rd generation with Free T4 reflex; Future  -     UA w Reflex to Microscopic w Reflex to Culture; Future; Expected date: 2024    2. Dyslipidemia  -     aspirin (ECOTRIN LOW STRENGTH) 81 mg EC tablet; Take 1 tablet (81 mg total) by mouth daily  -     Comprehensive metabolic panel; Future  -     Lipid panel; Future    3. Prediabetes  -     Comprehensive metabolic panel; Future  -     TSH, 3rd generation with Free T4 reflex; Future  -     Hemoglobin A1C; Future  -     Albumin / creatinine urine ratio; Future    4. History of CVA (cerebrovascular accident)  -     aspirin (ECOTRIN LOW STRENGTH) 81 mg EC tablet; Take 1 tablet (81 mg total) by mouth daily  -     Comprehensive metabolic panel; Future  -     TSH, 3rd generation with Free T4 reflex; Future    5. Chronic obstructive pulmonary disease, unspecified COPD type ([facility])  -     budesonide-formoterol (Symbicort) 160-4.5 mcg/act inhaler; Rinse mouth after use.  -     albuterol (PROVENTIL HFA,VENTOLIN HFA) 90 mcg/act inhaler; Inhale 1 puff every 6 (six) hours as needed for wheezing  -     CBC and differential; Future    6. Allergic rhinitis, unspecified seasonality, unspecified trigger  -     loratadine (CLARITIN) 10 mg tablet; Take 1 tablet (10 mg total) by mouth daily  -     CBC and differential; Future    7. Ex-smoker    8. PAD (peripheral artery disease) (HCC)    9. Need for vaccination  -     Zoster Vac Recomb Adjuvanted (Shingrix)  50 MCG/0.5ML SUSR; Inject 0.5 mL into a muscle once for 1 dose Repeat dose in 2 to 6 months      Regarding her hypertension patient asserts that her blood pressure is high here.  The patient's home blood pressure is within normal limits.  Discussed with patient.  Patient has no acute symptoms cardiovascular or neuro from it.  Continue taking her medications.  Advised low-salt and condiment diet.  Decrease weight with diet and exercise.  Hydrate well.  Recheck labs again in 6 months.  Regarding her hyperlipidemia patient is stable on her current therapy and her LDL is 69 which is at goal for her.  She will continue the same therapy and I will recheck labs again in 6 months including her LFTs.  Regarding her prediabetes his glucose was 105 and her A1c was 5.8 now and 5.8 back in November 2022.  Advised patient to cut down on the starches that she admits to them, also cut down on the sweets.  Lose weight with diet and exercise.  Recheck labs again in 6 months.  Regarding her CVA history patient has no deficits.  Discussed with patient.  Continue current medical management.  Lose weight as well.  Recheck labs in 6 months.  Neuro follow-up as well.  Regarding her COPD and her allergies patient is stable on current therapy.  Her inhalers and allergy pill were refilled.  Discussed with patient.  Lose weight.  And patient is no longer smoking as of 2011.  Regarding her ex smoker status patient advised to continue it.  Lose weight.  Discussed with patient regarding her getting a low-dose CT of the lung without contrast to screen for lung cancer but patient declined it.  Discussed with patient at her next visit as well.  Regarding her PAD patient is stable.  Patient wears stockings.  Lose weight.  Careful with salt and condiments etc.  Treat her blood pressure, her prediabetes and cholesterol.  Basically medical management.  Elevate legs when she can.  Patient declined to get the PCV 20 vaccine with us today.  Patient will  get her shingles vaccine at the pharmacy.  Discussed with patient that she can get her PCV 20 vaccine at the pharmacy as well if she changes her mind before her next appointment with us.  RTO 6 months for her Medicare annual well visit and do the blood work and urine before.      Lung Cancer Screening Shared Decision Making: I discussed with her that she is a candidate for lung cancer CT screening.     The following Shared Decision-Making points were covered:  Benefits of screening were discussed, including the rates of reduction in death from lung cancer and other causes.  Harms of screening were reviewed, including false positive tests, radiation exposure levels, risks of invasive procedures, risks of complications of screening, and risk of overdiagnosis.  I counseled on the importance of adherence to annual lung cancer LDCT screening, impact of co-morbidities, and ability or willingness to undergo diagnosis and treatment.  I counseled on the importance of maintaining abstinence as a former smoker or was counseled on the importance of smoking cessation if a current smoker    Review of Eligibility Criteria: She meets all of the criteria for Lung Cancer Screening.   - She is 78 y.o.   - She has 20 pack year tobacco history and is a current smoker or has quit within the past 15 years  - She presents no signs or symptoms of lung cancer    After discussion, the patient decided to elect lung cancer screening.        Subjective      78-year-old female here for evaluation of hypertension and her prediabetes.  Patient asserts that her blood pressure usually is high at the doctor's office.  But at home is usually 140/70.  Patient denies any acute cardiovascular or neuro symptoms from it.  Patient did blood work recently.  Patient would like a refill on all her meds.  Patient has a history of a stroke and has no deficits.  Patient denies tobacco and quit in 2011.  Patient is up-to-date with her vaccines except that she needs  the PCV 20 and the shingles vaccine.  Patient also wears stockings for her PAD.  And patient is continent of her urine.  Patient says she no longer goes for her mammogram.      Review of Systems   Constitutional:  Negative for activity change, appetite change, chills, fatigue, fever and unexpected weight change.   HENT:  Negative for congestion, hearing loss and sore throat.    Eyes:  Negative for visual disturbance.   Respiratory:  Negative for cough and shortness of breath.    Cardiovascular:  Negative for chest pain and palpitations.   Gastrointestinal:  Negative for abdominal pain, blood in stool, constipation, diarrhea, nausea and vomiting.   Genitourinary:  Negative for dysuria and hematuria.   Neurological:  Negative for dizziness and headaches.   Psychiatric/Behavioral:  Negative for dysphoric mood. The patient is not nervous/anxious.        Current Outpatient Medications on File Prior to Visit   Medication Sig   • Blood Pressure Monitoring (BLOOD PRESSURE CUFF) MISC by Does not apply route daily   • meclizine (ANTIVERT) 12.5 MG tablet Take 1 tablet (12.5 mg total) by mouth every 12 (twelve) hours as needed for dizziness   • Melatonin 5 MG TABS Take 1 tablet (5 mg total) by mouth daily at bedtime as needed (sleep issue)   • simethicone (MYLICON,GAS-X) 180 MG capsule Take 1 capsule (180 mg total) by mouth every 6 (six) hours as needed for flatulence   • simvastatin (ZOCOR) 10 mg tablet Take 1 tablet (10 mg total) by mouth daily at bedtime   • [DISCONTINUED] albuterol (PROVENTIL HFA,VENTOLIN HFA) 90 mcg/act inhaler INHALE 2 PUFFS BY MOUTH EVERY 6 HOURS AS NEEDED FOR WHEEZING OR SHORTNESS OF BREATH   • [DISCONTINUED] aspirin (ECOTRIN LOW STRENGTH) 81 mg EC tablet Take 1 tablet (81 mg total) by mouth daily   • [DISCONTINUED] budesonide-formoterol (Symbicort) 160-4.5 mcg/act inhaler Rinse mouth after use.   • [DISCONTINUED] fluocinonide (LIDEX) 0.05 % external solution Apply topically 2 (two) times a day   •  "[DISCONTINUED] fluticasone (FLONASE) 50 mcg/act nasal spray 1 spray into each nostril daily   • [DISCONTINUED] hydrochlorothiazide (HYDRODIURIL) 12.5 mg tablet TAKE ONE TABLET BY MOUTH ONE TIME DAILY   • [DISCONTINUED] loratadine (CLARITIN) 10 mg tablet Take 1 tablet (10 mg total) by mouth in the morning.   • Oyster Shell 500 MG TABS Take 2 tablets (1,000 mg total) by mouth daily   • prednisoLONE acetate (PRED FORTE) 1 % ophthalmic suspension  (Patient not taking: Reported on 2/17/2023)   • [DISCONTINUED] meclizine (ANTIVERT) 12.5 MG tablet Take 1 tablet (12.5 mg total) by mouth 3 (three) times a day as needed for dizziness   • [DISCONTINUED] ofloxacin (OCUFLOX) 0.3 % ophthalmic solution  (Patient not taking: Reported on 3/19/2024)   • [DISCONTINUED] olopatadine HCl (PATADAY) 0.2 % opth drops Administer 1 drop to both eyes as needed in the morning and 1 drop as needed in the evening (allergies). (Patient not taking: Reported on 3/19/2024)   • [DISCONTINUED] Prolensa 0.07 % SOLN  (Patient not taking: Reported on 4/20/2023)       Objective     /76 (BP Location: Left arm, Patient Position: Sitting, Cuff Size: Adult)   Pulse 93   Temp (!) 97.4 °F (36.3 °C) (Temporal)   Resp 16   Ht 4' 11\" (1.499 m)   Wt 79.1 kg (174 lb 6.4 oz)   SpO2 97%   BMI 35.22 kg/m²     Physical Exam  Vitals reviewed.   Constitutional:       General: She is not in acute distress.     Appearance: Normal appearance. She is obese. She is not ill-appearing.   HENT:      Head: Normocephalic and atraumatic.      Right Ear: External ear normal.      Left Ear: External ear normal.      Mouth/Throat:      Mouth: Mucous membranes are moist.      Pharynx: Oropharynx is clear.   Eyes:      Extraocular Movements: Extraocular movements intact.      Conjunctiva/sclera: Conjunctivae normal.   Neck:      Vascular: No carotid bruit.   Cardiovascular:      Rate and Rhythm: Normal rate and regular rhythm.   Pulmonary:      Effort: Pulmonary effort is " normal.      Breath sounds: Normal breath sounds.   Musculoskeletal:         General: No tenderness.      Cervical back: Neck supple.      Right lower leg: No edema.      Left lower leg: No edema.      Comments: No calf tenderness bilateral.   Lymphadenopathy:      Cervical: No cervical adenopathy.   Skin:     General: Skin is warm.   Neurological:      General: No focal deficit present.      Mental Status: She is alert and oriented to person, place, and time.   Psychiatric:         Mood and Affect: Mood normal.         Behavior: Behavior normal.       Samina Almaguer MD

## 2024-06-15 DIAGNOSIS — J30.9 ALLERGIC RHINITIS, UNSPECIFIED SEASONALITY, UNSPECIFIED TRIGGER: ICD-10-CM

## 2024-06-16 RX ORDER — LORATADINE 10 MG/1
10 TABLET ORAL DAILY
Qty: 90 TABLET | Refills: 1 | Status: SHIPPED | OUTPATIENT
Start: 2024-06-16

## 2024-09-16 ENCOUNTER — APPOINTMENT (OUTPATIENT)
Dept: LAB | Facility: CLINIC | Age: 79
End: 2024-09-16
Payer: COMMERCIAL

## 2024-09-16 DIAGNOSIS — Z86.73 HISTORY OF CVA (CEREBROVASCULAR ACCIDENT): ICD-10-CM

## 2024-09-16 DIAGNOSIS — J30.9 ALLERGIC RHINITIS, UNSPECIFIED SEASONALITY, UNSPECIFIED TRIGGER: ICD-10-CM

## 2024-09-16 DIAGNOSIS — R73.03 PREDIABETES: ICD-10-CM

## 2024-09-16 DIAGNOSIS — J44.9 CHRONIC OBSTRUCTIVE PULMONARY DISEASE, UNSPECIFIED COPD TYPE (HCC): ICD-10-CM

## 2024-09-16 DIAGNOSIS — E78.5 DYSLIPIDEMIA: ICD-10-CM

## 2024-09-16 DIAGNOSIS — I10 ESSENTIAL HYPERTENSION: ICD-10-CM

## 2024-09-16 LAB
ALBUMIN SERPL BCG-MCNC: 3.7 G/DL (ref 3.5–5)
ALP SERPL-CCNC: 94 U/L (ref 34–104)
ALT SERPL W P-5'-P-CCNC: 13 U/L (ref 7–52)
ANION GAP SERPL CALCULATED.3IONS-SCNC: 10 MMOL/L (ref 4–13)
AST SERPL W P-5'-P-CCNC: 24 U/L (ref 13–39)
BACTERIA UR QL AUTO: ABNORMAL /HPF
BASOPHILS # BLD AUTO: 0.04 THOUSANDS/ΜL (ref 0–0.1)
BASOPHILS NFR BLD AUTO: 1 % (ref 0–1)
BILIRUB SERPL-MCNC: 0.41 MG/DL (ref 0.2–1)
BILIRUB UR QL STRIP: NEGATIVE
BUN SERPL-MCNC: 19 MG/DL (ref 5–25)
CALCIUM SERPL-MCNC: 9.2 MG/DL (ref 8.4–10.2)
CHLORIDE SERPL-SCNC: 102 MMOL/L (ref 96–108)
CHOLEST SERPL-MCNC: 134 MG/DL
CLARITY UR: CLEAR
CO2 SERPL-SCNC: 29 MMOL/L (ref 21–32)
COLOR UR: YELLOW
CREAT SERPL-MCNC: 0.62 MG/DL (ref 0.6–1.3)
CREAT UR-MCNC: 156 MG/DL
EOSINOPHIL # BLD AUTO: 0.16 THOUSAND/ΜL (ref 0–0.61)
EOSINOPHIL NFR BLD AUTO: 2 % (ref 0–6)
ERYTHROCYTE [DISTWIDTH] IN BLOOD BY AUTOMATED COUNT: 14.6 % (ref 11.6–15.1)
EST. AVERAGE GLUCOSE BLD GHB EST-MCNC: 123 MG/DL
GFR SERPL CREATININE-BSD FRML MDRD: 86 ML/MIN/1.73SQ M
GLUCOSE P FAST SERPL-MCNC: 90 MG/DL (ref 65–99)
GLUCOSE UR STRIP-MCNC: NEGATIVE MG/DL
HBA1C MFR BLD: 5.9 %
HCT VFR BLD AUTO: 41.9 % (ref 34.8–46.1)
HDLC SERPL-MCNC: 53 MG/DL
HGB BLD-MCNC: 13.1 G/DL (ref 11.5–15.4)
HGB UR QL STRIP.AUTO: NEGATIVE
IMM GRANULOCYTES # BLD AUTO: 0.03 THOUSAND/UL (ref 0–0.2)
IMM GRANULOCYTES NFR BLD AUTO: 0 % (ref 0–2)
KETONES UR STRIP-MCNC: NEGATIVE MG/DL
LDLC SERPL CALC-MCNC: 67 MG/DL (ref 0–100)
LEUKOCYTE ESTERASE UR QL STRIP: ABNORMAL
LYMPHOCYTES # BLD AUTO: 1.73 THOUSANDS/ΜL (ref 0.6–4.47)
LYMPHOCYTES NFR BLD AUTO: 21 % (ref 14–44)
MCH RBC QN AUTO: 28.7 PG (ref 26.8–34.3)
MCHC RBC AUTO-ENTMCNC: 31.3 G/DL (ref 31.4–37.4)
MCV RBC AUTO: 92 FL (ref 82–98)
MICROALBUMIN UR-MCNC: 17.5 MG/L
MICROALBUMIN/CREAT 24H UR: 11 MG/G CREATININE (ref 0–30)
MONOCYTES # BLD AUTO: 0.85 THOUSAND/ΜL (ref 0.17–1.22)
MONOCYTES NFR BLD AUTO: 10 % (ref 4–12)
MUCOUS THREADS UR QL AUTO: ABNORMAL
NEUTROPHILS # BLD AUTO: 5.43 THOUSANDS/ΜL (ref 1.85–7.62)
NEUTS SEG NFR BLD AUTO: 66 % (ref 43–75)
NITRITE UR QL STRIP: NEGATIVE
NON-SQ EPI CELLS URNS QL MICRO: ABNORMAL /HPF
NONHDLC SERPL-MCNC: 81 MG/DL
NRBC BLD AUTO-RTO: 0 /100 WBCS
PH UR STRIP.AUTO: 6.5 [PH]
PLATELET # BLD AUTO: 245 THOUSANDS/UL (ref 149–390)
PMV BLD AUTO: 10.8 FL (ref 8.9–12.7)
POTASSIUM SERPL-SCNC: 4 MMOL/L (ref 3.5–5.3)
PROT SERPL-MCNC: 7.1 G/DL (ref 6.4–8.4)
PROT UR STRIP-MCNC: ABNORMAL MG/DL
RBC # BLD AUTO: 4.56 MILLION/UL (ref 3.81–5.12)
RBC #/AREA URNS AUTO: ABNORMAL /HPF
SODIUM SERPL-SCNC: 141 MMOL/L (ref 135–147)
SP GR UR STRIP.AUTO: 1.03 (ref 1–1.03)
TRIGL SERPL-MCNC: 69 MG/DL
TSH SERPL DL<=0.05 MIU/L-ACNC: 1.2 UIU/ML (ref 0.45–4.5)
UROBILINOGEN UR STRIP-ACNC: <2 MG/DL
WBC # BLD AUTO: 8.24 THOUSAND/UL (ref 4.31–10.16)
WBC #/AREA URNS AUTO: ABNORMAL /HPF

## 2024-09-16 PROCEDURE — 36415 COLL VENOUS BLD VENIPUNCTURE: CPT

## 2024-09-16 PROCEDURE — 80061 LIPID PANEL: CPT

## 2024-09-16 PROCEDURE — 82570 ASSAY OF URINE CREATININE: CPT

## 2024-09-16 PROCEDURE — 83036 HEMOGLOBIN GLYCOSYLATED A1C: CPT

## 2024-09-16 PROCEDURE — 85025 COMPLETE CBC W/AUTO DIFF WBC: CPT

## 2024-09-16 PROCEDURE — 80053 COMPREHEN METABOLIC PANEL: CPT

## 2024-09-16 PROCEDURE — 82043 UR ALBUMIN QUANTITATIVE: CPT

## 2024-09-16 PROCEDURE — 81001 URINALYSIS AUTO W/SCOPE: CPT

## 2024-09-16 PROCEDURE — 84443 ASSAY THYROID STIM HORMONE: CPT

## 2024-09-16 PROCEDURE — 87086 URINE CULTURE/COLONY COUNT: CPT

## 2024-09-17 LAB — BACTERIA UR CULT: NORMAL

## 2024-10-23 DIAGNOSIS — R42 VERTIGO: ICD-10-CM

## 2024-10-24 RX ORDER — MECLIZINE HCL 12.5 MG 12.5 MG/1
12.5 TABLET ORAL EVERY 12 HOURS PRN
Qty: 30 TABLET | Refills: 2 | Status: SHIPPED | OUTPATIENT
Start: 2024-10-24

## 2024-10-24 RX ORDER — MECLIZINE HCL 12.5 MG 12.5 MG/1
12.5 TABLET ORAL 3 TIMES DAILY PRN
Qty: 60 TABLET | Refills: 0 | Status: SHIPPED | OUTPATIENT
Start: 2024-10-24

## 2024-11-25 ENCOUNTER — TELEPHONE (OUTPATIENT)
Age: 79
End: 2024-11-25

## 2024-11-25 ENCOUNTER — OFFICE VISIT (OUTPATIENT)
Dept: FAMILY MEDICINE CLINIC | Facility: CLINIC | Age: 79
End: 2024-11-25
Payer: COMMERCIAL

## 2024-11-25 VITALS
WEIGHT: 165.2 LBS | RESPIRATION RATE: 16 BRPM | BODY MASS INDEX: 33.3 KG/M2 | TEMPERATURE: 97.8 F | DIASTOLIC BLOOD PRESSURE: 70 MMHG | OXYGEN SATURATION: 95 % | SYSTOLIC BLOOD PRESSURE: 140 MMHG | HEART RATE: 76 BPM | HEIGHT: 59 IN

## 2024-11-25 DIAGNOSIS — R42 VERTIGO: ICD-10-CM

## 2024-11-25 DIAGNOSIS — I10 ESSENTIAL HYPERTENSION: ICD-10-CM

## 2024-11-25 DIAGNOSIS — J30.9 ALLERGIC RHINITIS, UNSPECIFIED SEASONALITY, UNSPECIFIED TRIGGER: ICD-10-CM

## 2024-11-25 DIAGNOSIS — M85.80 OSTEOPENIA, UNSPECIFIED LOCATION: ICD-10-CM

## 2024-11-25 DIAGNOSIS — R31.9 HEMATURIA, UNSPECIFIED TYPE: ICD-10-CM

## 2024-11-25 DIAGNOSIS — Z23 NEED FOR VACCINATION: ICD-10-CM

## 2024-11-25 DIAGNOSIS — Z78.0 POSTMENOPAUSAL: ICD-10-CM

## 2024-11-25 DIAGNOSIS — Z87.891 EX-SMOKER: ICD-10-CM

## 2024-11-25 DIAGNOSIS — Z23 ENCOUNTER FOR IMMUNIZATION: ICD-10-CM

## 2024-11-25 DIAGNOSIS — G47.00 INSOMNIA, UNSPECIFIED TYPE: ICD-10-CM

## 2024-11-25 DIAGNOSIS — E78.5 DYSLIPIDEMIA: ICD-10-CM

## 2024-11-25 DIAGNOSIS — J44.9 CHRONIC OBSTRUCTIVE PULMONARY DISEASE, UNSPECIFIED COPD TYPE (HCC): ICD-10-CM

## 2024-11-25 DIAGNOSIS — R73.03 PREDIABETES: ICD-10-CM

## 2024-11-25 DIAGNOSIS — Z86.73 HISTORY OF CVA (CEREBROVASCULAR ACCIDENT): ICD-10-CM

## 2024-11-25 DIAGNOSIS — Z00.00 MEDICARE ANNUAL WELLNESS VISIT, SUBSEQUENT: Primary | ICD-10-CM

## 2024-11-25 PROCEDURE — G0008 ADMIN INFLUENZA VIRUS VAC: HCPCS

## 2024-11-25 PROCEDURE — 99214 OFFICE O/P EST MOD 30 MIN: CPT | Performed by: FAMILY MEDICINE

## 2024-11-25 PROCEDURE — G0439 PPPS, SUBSEQ VISIT: HCPCS | Performed by: FAMILY MEDICINE

## 2024-11-25 PROCEDURE — 90662 IIV NO PRSV INCREASED AG IM: CPT

## 2024-11-25 RX ORDER — ALBUTEROL SULFATE 90 UG/1
1 INHALANT RESPIRATORY (INHALATION) EVERY 6 HOURS PRN
Qty: 8.5 G | Refills: 1 | Status: SHIPPED | OUTPATIENT
Start: 2024-11-25

## 2024-11-25 RX ORDER — ZOSTER VACCINE RECOMBINANT, ADJUVANTED 50 MCG/0.5
0.5 KIT INTRAMUSCULAR ONCE
Qty: 1 EACH | Refills: 1 | Status: SHIPPED | OUTPATIENT
Start: 2024-11-25 | End: 2024-11-25

## 2024-11-25 RX ORDER — ASPIRIN 81 MG/1
81 TABLET ORAL DAILY
Qty: 90 TABLET | Refills: 3 | Status: SHIPPED | OUTPATIENT
Start: 2024-11-25

## 2024-11-25 RX ORDER — SIMVASTATIN 10 MG
10 TABLET ORAL
Qty: 90 TABLET | Refills: 3 | Status: SHIPPED | OUTPATIENT
Start: 2024-11-25

## 2024-11-25 RX ORDER — LORATADINE 10 MG/1
10 TABLET ORAL DAILY
Qty: 90 TABLET | Refills: 1 | Status: SHIPPED | OUTPATIENT
Start: 2024-11-25

## 2024-11-25 RX ORDER — MECLIZINE HCL 12.5 MG 12.5 MG/1
12.5 TABLET ORAL EVERY 12 HOURS PRN
Qty: 30 TABLET | Refills: 2 | Status: SHIPPED | OUTPATIENT
Start: 2024-11-25

## 2024-11-25 RX ORDER — BUDESONIDE AND FORMOTEROL FUMARATE DIHYDRATE 160; 4.5 UG/1; UG/1
AEROSOL RESPIRATORY (INHALATION)
Qty: 30.6 G | Refills: 3 | Status: SHIPPED | OUTPATIENT
Start: 2024-11-25

## 2024-11-25 RX ORDER — HYDROCHLOROTHIAZIDE 12.5 MG/1
12.5 TABLET ORAL DAILY
Qty: 90 TABLET | Refills: 3 | Status: SHIPPED | OUTPATIENT
Start: 2024-11-25

## 2024-11-25 NOTE — ASSESSMENT & PLAN NOTE
Stable.  No deficits.  Medical management.  Labs reviewed.  Recheck labs in 3 months also for the above diagnoses which will also help with this.  Meds refilled as per patient's request.  Orders:    aspirin (ECOTRIN LOW STRENGTH) 81 mg EC tablet; Take 1 tablet (81 mg total) by mouth daily    simvastatin (ZOCOR) 10 mg tablet; Take 1 tablet (10 mg total) by mouth daily at bedtime

## 2024-11-25 NOTE — ASSESSMENT & PLAN NOTE
Stable.  Advised to continue current therapy.  Medications refilled.  Hydrate well.  Low-sodium diet.  Orders:    aspirin (ECOTRIN LOW STRENGTH) 81 mg EC tablet; Take 1 tablet (81 mg total) by mouth daily    hydroCHLOROthiazide 12.5 mg tablet; Take 1 tablet (12.5 mg total) by mouth daily    Comprehensive metabolic panel; Future    UA w Reflex to Microscopic w Reflex to Culture; Future

## 2024-11-25 NOTE — ASSESSMENT & PLAN NOTE
Discussed with patient.  Vitamin/supplement.  Exercise as tolerated.  Bone density ordered.  Orders:    DXA bone density spine hip and pelvis; Future

## 2024-11-25 NOTE — ASSESSMENT & PLAN NOTE
Improved.  LFTs normal.  Total cholesterol in September went down to 134 from 147 from March of this year, triglycerides went down to 69 from 109, HDL went down to 53 from 56 and her LDL went down to 67 from 69.  Patient advised to continue her current therapy.  Have less fats starches and sweets.  Lose weight with a better diet and exercise as tolerated.  Recheck her labs again in 3 months.  Meds refilled.  Orders:    aspirin (ECOTRIN LOW STRENGTH) 81 mg EC tablet; Take 1 tablet (81 mg total) by mouth daily    simvastatin (ZOCOR) 10 mg tablet; Take 1 tablet (10 mg total) by mouth daily at bedtime    Comprehensive metabolic panel; Future    Lipid panel; Future

## 2024-11-25 NOTE — TELEPHONE ENCOUNTER
Pharmacy called regarding the script for the symbicort inhaler. They need instructions. Currently just says to rinse after use. Need to know how often pt should be taking. Please advise.

## 2024-11-25 NOTE — ASSESSMENT & PLAN NOTE
Stable on current therapy.  Inhalers refilled as per her request.  Patient is an ex-smoker.  Patient is ordered the CT below.  Patient has seen pulmonary in the past.  Orders:    albuterol (PROVENTIL HFA,VENTOLIN HFA) 90 mcg/act inhaler; Inhale 1 puff every 6 (six) hours as needed for wheezing    budesonide-formoterol (Symbicort) 160-4.5 mcg/act inhaler; Rinse mouth after use.    CT lung screening program; Future

## 2024-11-25 NOTE — ASSESSMENT & PLAN NOTE
Not improved.  Her glucose was 90 which improved but her A1c went up to 5.9 in September 1 is 5.8 in March.  Patient advised to have less starches sweets and fats.  Lose weight with a better diet and exercise as tolerated.  Hydrate well.  And recheck labs in 3 months.  Orders:    Comprehensive metabolic panel; Future    Hemoglobin A1C; Future    Albumin / creatinine urine ratio; Future

## 2024-11-25 NOTE — ASSESSMENT & PLAN NOTE
Stable.  Med refill.  Orders:    loratadine (Allergy Relief) 10 mg tablet; Take 1 tablet (10 mg total) by mouth daily

## 2024-11-25 NOTE — PROGRESS NOTES
Name: Jigna More      : 1945      MRN: [id number]  Encounter Provider: Samina Almaguer MD  Encounter Date: 2024   Encounter department: [facility]    Assessment & Plan  Medicare annual wellness visit, subsequent  Regarding her Medicare annual well visit, patient is given age and diagnosis appropriate evaluation and care.  Patient's blood work and urine from September was discussed with patient.  Patient is ordered more blood work and urine before next visit.  Patient received the flu vaccine with us today.  Patient advised diet and exercise.  Multivitamin.  Calcium vitamin D.  Well hydration.  Routine skin checks/self exams.  Orders:    Comprehensive metabolic panel; Future    Lipid panel; Future    UA w Reflex to Microscopic w Reflex to Culture; Future    Essential hypertension  Stable.  Advised to continue current therapy.  Medications refilled.  Hydrate well.  Low-sodium diet.  Orders:    aspirin (ECOTRIN LOW STRENGTH) 81 mg EC tablet; Take 1 tablet (81 mg total) by mouth daily    hydroCHLOROthiazide 12.5 mg tablet; Take 1 tablet (12.5 mg total) by mouth daily    Comprehensive metabolic panel; Future    UA w Reflex to Microscopic w Reflex to Culture; Future    Dyslipidemia  Improved.  LFTs normal.  Total cholesterol in September went down to 134 from 147 from March of this year, triglycerides went down to 69 from 109, HDL went down to 53 from 56 and her LDL went down to 67 from 69.  Patient advised to continue her current therapy.  Have less fats starches and sweets.  Lose weight with a better diet and exercise as tolerated.  Recheck her labs again in 3 months.  Meds refilled.  Orders:    aspirin (ECOTRIN LOW STRENGTH) 81 mg EC tablet; Take 1 tablet (81 mg total) by mouth daily    simvastatin (ZOCOR) 10 mg tablet; Take 1 tablet (10 mg total) by mouth daily at bedtime    Comprehensive metabolic panel; Future    Lipid panel; Future    Prediabetes  Not  improved.  Her glucose was 90 which improved but her A1c went up to 5.9 in September 1 is 5.8 in March.  Patient advised to have less starches sweets and fats.  Lose weight with a better diet and exercise as tolerated.  Hydrate well.  And recheck labs in 3 months.  Orders:    Comprehensive metabolic panel; Future    Hemoglobin A1C; Future    Albumin / creatinine urine ratio; Future    Hematuria, unspecified type  Discussed with patient.  Patient without symptoms.  Of note patient had a negative CT abdomen pelvis in January of this year.  Discussed with patient.  Patient education.  Patient referred to GYN and urologist to be evaluated.  Patient is also ordered a renal bladder sonogram.  And she will recheck her urine with her next labs which is in 3 months.  Orders:    Ambulatory Referral to Obstetrics / Gynecology; Future    Ambulatory Referral to Urology; Future    US kidney and bladder with pvr; Future    UA w Reflex to Microscopic w Reflex to Culture; Future    Ex-smoker  Patient is a neck smoker.  Discussed with patient.  And patient sent for the CT of the lung.  Orders:    CT lung screening program; Future    Chronic obstructive pulmonary disease, unspecified COPD type (HCC)  Stable on current therapy.  Inhalers refilled as per her request.  Patient is an ex-smoker.  Patient is ordered the CT below.  Patient has seen pulmonary in the past.  Orders:    albuterol (PROVENTIL HFA,VENTOLIN HFA) 90 mcg/act inhaler; Inhale 1 puff every 6 (six) hours as needed for wheezing    budesonide-formoterol (Symbicort) 160-4.5 mcg/act inhaler; Rinse mouth after use.    CT lung screening program; Future    Osteopenia, unspecified location  Discussed with patient.  Vitamin/supplement.  Exercise as tolerated.  Bone density ordered.  Orders:    DXA bone density spine hip and pelvis; Future    Postmenopausal  Please see above.  Orders:    DXA bone density spine hip and pelvis; Future    History of CVA (cerebrovascular  accident)  Stable.  No deficits.  Medical management.  Labs reviewed.  Recheck labs in 3 months also for the above diagnoses which will also help with this.  Meds refilled as per patient's request.  Orders:    aspirin (ECOTRIN LOW STRENGTH) 81 mg EC tablet; Take 1 tablet (81 mg total) by mouth daily    simvastatin (ZOCOR) 10 mg tablet; Take 1 tablet (10 mg total) by mouth daily at bedtime    Allergic rhinitis, unspecified seasonality, unspecified trigger  Stable.  Med refill.  Orders:    loratadine (Allergy Relief) 10 mg tablet; Take 1 tablet (10 mg total) by mouth daily    Vertigo  Stable.  Med refilled.  Well hydration.  Patient reeducation.  Orders:    meclizine (ANTIVERT) 12.5 MG tablet; Take 1 tablet (12.5 mg total) by mouth every 12 (twelve) hours as needed for dizziness    Insomnia, unspecified type  Discussed with patient.  Sleep hygiene.  Med as needed.  Orders:    Melatonin 5 MG TABS; Take 1 tablet (5 mg total) by mouth daily at bedtime as needed (sleep issue)    Encounter for immunization  Patient received the flu vaccine with us today.  Orders:    influenza vaccine, high-dose, PF 0.5 mL (Fluzone High Dose)    Need for vaccination  Patient is sent these vaccines below to her pharmacy as discussed with patient.  Orders:    Zoster Vac Recomb Adjuvanted (Shingrix) 50 MCG/0.5ML SUSR; Inject 0.5 mL into a muscle once for 1 dose Repeat dose in 2 to 6 months    tetanus-diphtheria-acellular pertussis (ADACEL) 5-2-15.5 LF-mcg/0.5 injection; Inject 0.5 mL into a muscle once for 1 dose    RSV Pre-Fusion F A&B Vac Rcmb (Abrysvo) SOLR vaccine; Inject 0.5 mL into a muscle once for 1 dose        RTO 3 months and do the blood work and urine before.  Patient to see me prior to that for anything else in between.          Depression Screening and Follow-up Plan: Patient was screened for depression during today's encounter. They screened negative with a PHQ-2 score of 0.    Lung Cancer Screening Shared Decision Making: I  discussed with her that she is a candidate for lung cancer CT screening.     The following Shared Decision-Making points were covered:  Benefits of screening were discussed, including the rates of reduction in death from lung cancer and other causes.  Harms of screening were reviewed, including false positive tests, radiation exposure levels, risks of invasive procedures, risks of complications of screening, and risk of overdiagnosis.  I counseled on the importance of adherence to annual lung cancer LDCT screening, impact of co-morbidities, and ability or willingness to undergo diagnosis and treatment.  I counseled on the importance of maintaining abstinence as a former smoker or was counseled on the importance of smoking cessation if a current smoker    Review of Eligibility Criteria: She meets all of the criteria for Lung Cancer Screening.   - She is 79 y.o.   - She has 20 pack year tobacco history and is a current smoker or has quit within the past 15 years  - She presents no signs or symptoms of lung cancer    After discussion, the patient decided to elect lung cancer screening.      Preventive health issues were discussed with patient, and age appropriate screening tests were ordered as noted in patient's After Visit Summary. Personalized health advice and appropriate referrals for health education or preventive services given if needed, as noted in patient's After Visit Summary.    History of Present Illness     79-year-old female here for her Medicare annual well visit.  Patient also like to be evaluated for her hypertension, hyperlipidemia and prediabetes.  Patient did blood work and urine in September.  Patient is requesting a refill on all her medications.  Patient is a neck smoker.  Patient is amenable to getting the flu vaccine with us today.  No history of an adverse reaction to vaccines in the past.  Patient is due for her Tdap vaccine, Shingrix vaccine as well as RSV vaccine.       Patient Care  Team:  Samina Almaguer MD as PCP - General (Family Medicine)  Denise José MD as PCP - [facility]CP-[facility] ([facility])    Review of Systems   Constitutional:  Negative for activity change, appetite change, chills, fatigue and fever.   HENT:  Negative for congestion, hearing loss and sore throat.    Eyes:  Negative for visual disturbance.   Respiratory:  Negative for cough and shortness of breath.    Cardiovascular:  Negative for chest pain and palpitations.   Gastrointestinal:  Negative for abdominal pain, blood in stool, constipation, diarrhea, nausea and vomiting.   Genitourinary:  Negative for dysuria and hematuria.   Musculoskeletal:  Negative for arthralgias.   Skin:  Negative for rash.   Neurological:  Negative for dizziness and headaches.   Psychiatric/Behavioral:  Positive for sleep disturbance. Negative for dysphoric mood, self-injury and suicidal ideas. The patient is not nervous/anxious.      Medical History Reviewed by provider this encounter:  Tobacco  Allergies  Problems  Med Hx  Surg Hx  Fam Hx       Annual Wellness Visit Questionnaire   Jigna is here for her Subsequent Wellness visit. Last Medicare Wellness visit information reviewed, patient interviewed and updates made to the record today.      Health Risk Assessment:   Patient rates overall health as good. Patient feels that their physical health rating is same. Patient is satisfied with their life. Eyesight was rated as much better. Hearing was rated as much better. Patient feels that their emotional and mental health rating is same. Patients states they are never, rarely angry. Patient states they are never, rarely unusually tired/fatigued. Pain experienced in the last 7 days has been some. Patient states that she has experienced no weight loss or gain in last 6 months.     Depression Screening:   PHQ-2 Score: 0      Fall Risk Screening:   In the past year, patient has experienced: no history of falling in past year      Urinary  Incontinence Screening:   Patient has not leaked urine accidently in the last six months.     Home Safety:  Patient does not have trouble with stairs inside or outside of their home. Patient has working smoke alarms and has working carbon monoxide detector. Home safety hazards include: none.     Nutrition:   Current diet is Regular.     Medications:   Patient is not currently taking any over-the-counter supplements. Patient is able to manage medications.     Activities of Daily Living (ADLs)/Instrumental Activities of Daily Living (IADLs):   Walk and transfer into and out of bed and chair?: Yes  Dress and groom yourself?: Yes    Bathe or shower yourself?: Yes    Feed yourself? Yes  Do your laundry/housekeeping?: Yes  Manage your money, pay your bills and track your expenses?: Yes  Make your own meals?: Yes    Do your own shopping?: Yes    Previous Hospitalizations:   Any hospitalizations or ED visits within the last 12 months?: No      Advance Care Planning:   Living will: Yes    Advanced directive: Yes      Comments: Discussed with patient today.    Cognitive Screening:   Provider or family/friend/caregiver concerned regarding cognition?: No    PREVENTIVE SCREENINGS      Cardiovascular Screening:    General: Screening Current      Diabetes Screening:     General: Screening Current      Cervical Cancer Screening:    General: Screening Not Indicated      Osteoporosis Screening:    General: Risks and Benefits Discussed    Due for: Bone Density Ultrasound      Lung Cancer Screening:     General: Screening Not Indicated      Hepatitis C Screening:    General: Screening Current    Screening, Brief Intervention, and Referral to Treatment (SBIRT)    Screening  Typical number of drinks in a day: 0  Typical number of drinks in a week: 0  Interpretation: Low risk drinking behavior.    AUDIT-C Screenin) How often did you have a drink containing alcohol in the past year? never  2) How many drinks did you have on a typical  "day when you were drinking in the past year? 0  3) How often did you have 6 or more drinks on one occasion in the past year? never    AUDIT-C Score: 0  Interpretation: Score 0-2 (female): Negative screen for alcohol misuse    Single Item Drug Screening:  How often have you used an illegal drug (including marijuana) or a prescription medication for non-medical reasons in the past year? never    Single Item Drug Screen Score: 0  Interpretation: Negative screen for possible drug use disorder    Other Counseling Topics:   Car/seat belt/driving safety, skin self-exam, sunscreen and calcium and vitamin D intake and regular weightbearing exercise.     Social Drivers of Health     Financial Resource Strain: Low Risk  (6/21/2023)    Overall Financial Resource Strain (CARDIA)     Difficulty of Paying Living Expenses: Not hard at all   Food Insecurity: No Food Insecurity (11/25/2024)    Hunger Vital Sign     Worried About Running Out of Food in the Last Year: Never true     Ran Out of Food in the Last Year: Never true   Transportation Needs: No Transportation Needs (11/25/2024)    PRAPARE - Transportation     Lack of Transportation (Medical): No     Lack of Transportation (Non-Medical): No   Housing Stability: Unknown (11/25/2024)    Housing Stability Vital Sign     Unable to Pay for Housing in the Last Year: No     Homeless in the Last Year: No   Utilities: Not At Risk (11/25/2024)    [facility] Utilities     Threatened with loss of utilities: No     No results found.    Objective   /70 (BP Location: Left arm, Patient Position: Sitting, Cuff Size: Adult)   Pulse 76   Temp 97.8 °F (36.6 °C) (Temporal)   Resp 16   Ht 4' 11\" (1.499 m)   Wt 74.9 kg (165 lb 3.2 oz)   SpO2 95%   BMI 33.37 kg/m²     Physical Exam  Vitals reviewed.   Constitutional:       General: She is not in acute distress.     Appearance: Normal appearance. She is obese. She is not ill-appearing.   HENT:      Head: Normocephalic and atraumatic.      Right " Ear: Tympanic membrane, ear canal and external ear normal.      Left Ear: Tympanic membrane, ear canal and external ear normal.      Mouth/Throat:      Mouth: Mucous membranes are moist.      Pharynx: Oropharynx is clear.   Eyes:      Extraocular Movements: Extraocular movements intact.      Conjunctiva/sclera: Conjunctivae normal.   Neck:      Vascular: No carotid bruit.   Cardiovascular:      Rate and Rhythm: Normal rate and regular rhythm.   Pulmonary:      Effort: Pulmonary effort is normal.      Breath sounds: Normal breath sounds.   Abdominal:      General: Bowel sounds are normal.      Palpations: Abdomen is soft.      Tenderness: There is no abdominal tenderness. There is no right CVA tenderness or left CVA tenderness.   Musculoskeletal:         General: No tenderness.      Right lower leg: No edema.      Left lower leg: No edema.      Comments: No calf tenderness bilateral.  Positive thoracic spine kyphosis.   Lymphadenopathy:      Cervical: No cervical adenopathy.   Skin:     General: Skin is warm.      Findings: No rash.   Neurological:      General: No focal deficit present.      Mental Status: She is alert and oriented to person, place, and time.   Psychiatric:         Mood and Affect: Mood normal.         Behavior: Behavior normal.         Thought Content: Thought content normal.

## 2024-11-25 NOTE — ASSESSMENT & PLAN NOTE
Patient is a neck smoker.  Discussed with patient.  And patient sent for the CT of the lung.  Orders:    CT lung screening program; Future

## 2024-11-26 NOTE — TELEPHONE ENCOUNTER
I was ref it as per request yest.   She should inhale 2 puffs, Twice daily. And rinse her mouth after. ty

## 2024-12-03 ENCOUNTER — OFFICE VISIT (OUTPATIENT)
Dept: OBGYN CLINIC | Facility: CLINIC | Age: 79
End: 2024-12-03
Payer: COMMERCIAL

## 2024-12-03 VITALS
DIASTOLIC BLOOD PRESSURE: 100 MMHG | SYSTOLIC BLOOD PRESSURE: 140 MMHG | BODY MASS INDEX: 33.16 KG/M2 | WEIGHT: 164.2 LBS

## 2024-12-03 DIAGNOSIS — Z12.11 SCREENING FOR COLON CANCER: Primary | ICD-10-CM

## 2024-12-03 DIAGNOSIS — Z01.419 ENCOUNTER FOR GYNECOLOGICAL EXAMINATION WITHOUT ABNORMAL FINDING: ICD-10-CM

## 2024-12-03 DIAGNOSIS — R31.9 HEMATURIA, UNSPECIFIED TYPE: ICD-10-CM

## 2024-12-03 DIAGNOSIS — Z12.31 ENCOUNTER FOR SCREENING MAMMOGRAM FOR BREAST CANCER: ICD-10-CM

## 2024-12-03 DIAGNOSIS — N95.2 ATROPHIC VAGINITIS: ICD-10-CM

## 2024-12-03 PROCEDURE — G0101 CA SCREEN;PELVIC/BREAST EXAM: HCPCS | Performed by: OBSTETRICS & GYNECOLOGY

## 2024-12-03 NOTE — PATIENT INSTRUCTIONS
Normal gynecological physical examination.  Self-breast examination stressed.  Mammogram ordered.  Cologaurd ordered.  Discussed regular exercise, healthy diet, importance of vitamin D and calcium supplements.  Discussed importance of sun block use during periods of prolonged sun exposure.  Patient will be seen in 1 year for routine gynecologic and medical examination.  Patient will call office for any problems, concerns, or issues which may arise during the interim.

## 2024-12-03 NOTE — PROGRESS NOTES
Assessment/Plan:    No problem-specific Assessment & Plan notes found for this encounter.       Diagnoses and all orders for this visit:    Screening for colon cancer  -     Cologuard    Encounter for gynecological examination without abnormal finding    Encounter for screening mammogram for breast cancer  -     Mammo screening bilateral w 3d and cad; Future    Atrophic vaginitis    Hematuria, unspecified type  -     Ambulatory Referral to Obstetrics / Gynecology          Normal gynecological physical examination.  Self-breast examination stressed.  Mammogram ordered.  Cologaurd ordered.  Discussed regular exercise, healthy diet, importance of vitamin D and calcium supplements.  Discussed importance of sun block use during periods of prolonged sun exposure.  Patient will be seen in 1 year for routine gynecologic and medical examination.  Patient will call office for any problems, concerns, or issues which may arise during the interim.     Subjective:          HPI    Patient ID: Jigna More is a 79 y.o. female who presents today for her annual gynecologic and medical examination    Menstrual status: Pt had a hysterectomy performed in 2011. No vaginal bleeding or abnormal d/c.    Vasomotor symptoms: Denies.    Patient reports normal appetite    Patient reports normal bowel and bladder habits. Was referred here by Dr. Almaguer for microscopic hematuria on urinalysis. Pt has never seen blood in urine, on toilet paper, or spotting in her underwear. No dysuria, urgency. Endorses some frequency but she states that she drinks a lot of water during the way. No episodes of incontinence. Referral was placed to urology and repeat urinalysis ordered by PCP.    Patient denies any significant pelvic or abdominal pain.    Patient denies any headaches, chest pain, shortness of breath fever shakes or chills.    Patient denies any COVID 19 symptoms including cough or loss of taste or smell.    COVID vaccine status: Aware  of vaccination protocols.    Medical problems: Pt managed for HTN, COPD, hx CVA, HLD    Colonoscopy status: Never had a colonoscopy and doesn't want one. Pt agreed to complete Cologaurd screening, so an order has been placed at today's visit.    Mammogram status: Pt last mammogram several years ago. Her original physician d/c screening due to age. Pt would like to continue, so an order for a screening mammogram has been placed into the EMR at today's visit.    The following portions of the patient's history were reviewed and updated as appropriate: allergies, current medications, past family history, past medical history, past social history, past surgical history and problem list.    Review of Systems   Constitutional: Negative.  Negative for appetite change, diaphoresis, fatigue, fever and unexpected weight change.   HENT: Negative.     Eyes: Negative.    Respiratory: Negative.     Cardiovascular: Negative.         Followed for blood pressure and cholesterol   Gastrointestinal: Negative.  Negative for abdominal pain, blood in stool, constipation, diarrhea, nausea and vomiting.   Endocrine: Negative.  Negative for cold intolerance and heat intolerance.   Genitourinary: Negative.  Negative for dysuria, frequency, hematuria, urgency, vaginal bleeding, vaginal discharge and vaginal pain.   Musculoskeletal: Negative.    Skin: Negative.    Allergic/Immunologic: Negative.    Neurological: Negative.    Hematological: Negative.  Negative for adenopathy.   Psychiatric/Behavioral: Negative.           Objective:      /100   Wt 74.5 kg (164 lb 3.2 oz)   BMI 33.16 kg/m²          Physical Exam  Constitutional:       General: She is not in acute distress.     Appearance: Normal appearance. She is well-developed. She is not diaphoretic.   HENT:      Head: Normocephalic and atraumatic.   Eyes:      Pupils: Pupils are equal, round, and reactive to light.   Cardiovascular:      Rate and Rhythm: Normal rate and regular rhythm.       Heart sounds: Normal heart sounds. No murmur heard.     No friction rub. No gallop.   Pulmonary:      Effort: Pulmonary effort is normal.      Breath sounds: Normal breath sounds.   Chest:   Breasts:     Breasts are symmetrical.      Right: No inverted nipple, mass, nipple discharge, skin change or tenderness.      Left: No inverted nipple, mass, nipple discharge, skin change or tenderness.   Abdominal:      General: Bowel sounds are normal.      Palpations: Abdomen is soft.   Genitourinary:     General: Normal vulva.      Exam position: Supine.      Labia:         Right: No rash or lesion.         Left: No rash or lesion.       Urethra: No prolapse or urethral swelling.      Vagina: Normal. No vaginal discharge, erythema, tenderness or bleeding.      Cervix: No discharge or friability.      Uterus: Not enlarged and not tender.       Adnexa:         Right: No mass, tenderness or fullness.          Left: No mass, tenderness or fullness.        Rectum: Normal. Guaiac result negative.      Comments: Pelvic exam revealed mild atrophy.  Good pelvic support confirmed.  Musculoskeletal:         General: Normal range of motion.      Cervical back: Normal range of motion and neck supple.   Lymphadenopathy:      Cervical: No cervical adenopathy.      Upper Body:      Right upper body: No supraclavicular adenopathy.      Left upper body: No supraclavicular adenopathy.   Skin:     General: Skin is warm and dry.      Findings: No rash.   Neurological:      General: No focal deficit present.      Mental Status: She is alert and oriented to person, place, and time.   Psychiatric:         Mood and Affect: Mood normal.         Speech: Speech normal.         Behavior: Behavior normal.         Thought Content: Thought content normal.         Judgment: Judgment normal.

## 2024-12-10 ENCOUNTER — HOSPITAL ENCOUNTER (OUTPATIENT)
Dept: RADIOLOGY | Age: 79
Discharge: HOME | End: 2024-12-10
Attending: FAMILY MEDICINE
Payer: COMMERCIAL

## 2024-12-10 DIAGNOSIS — R31.9 HEMATURIA, UNSPECIFIED TYPE: ICD-10-CM

## 2024-12-10 PROCEDURE — 76770 US EXAM ABDO BACK WALL COMP: CPT

## 2024-12-16 ENCOUNTER — HOSPITAL ENCOUNTER (OUTPATIENT)
Dept: RADIOLOGY | Age: 79
Discharge: HOME | End: 2024-12-16
Attending: FAMILY MEDICINE
Payer: COMMERCIAL

## 2024-12-16 ENCOUNTER — RESULTS FOLLOW-UP (OUTPATIENT)
Dept: FAMILY MEDICINE CLINIC | Facility: CLINIC | Age: 79
End: 2024-12-16

## 2024-12-16 ENCOUNTER — HOSPITAL ENCOUNTER (OUTPATIENT)
Dept: RADIOLOGY | Age: 79
Discharge: HOME | End: 2024-12-16
Payer: COMMERCIAL

## 2024-12-16 DIAGNOSIS — J44.9 CHRONIC OBSTRUCTIVE PULMONARY DISEASE, UNSPECIFIED COPD TYPE (HCC): ICD-10-CM

## 2024-12-16 DIAGNOSIS — Z87.891 EX-SMOKER: ICD-10-CM

## 2024-12-16 DIAGNOSIS — Z78.0 POSTMENOPAUSAL: ICD-10-CM

## 2024-12-16 DIAGNOSIS — M85.80 OSTEOPENIA, UNSPECIFIED LOCATION: ICD-10-CM

## 2024-12-16 PROCEDURE — 77080 DXA BONE DENSITY AXIAL: CPT

## 2024-12-26 ENCOUNTER — APPOINTMENT (EMERGENCY)
Dept: RADIOLOGY | Facility: HOSPITAL | Age: 79
End: 2024-12-26
Payer: COMMERCIAL

## 2024-12-26 ENCOUNTER — HOSPITAL ENCOUNTER (EMERGENCY)
Facility: HOSPITAL | Age: 79
Discharge: HOME | End: 2024-12-26
Attending: EMERGENCY MEDICINE
Payer: COMMERCIAL

## 2024-12-26 VITALS
HEART RATE: 75 BPM | RESPIRATION RATE: 18 BRPM | TEMPERATURE: 97.4 F | OXYGEN SATURATION: 97 % | DIASTOLIC BLOOD PRESSURE: 82 MMHG | SYSTOLIC BLOOD PRESSURE: 185 MMHG

## 2024-12-26 DIAGNOSIS — S22.49XA MULTIPLE RIB FRACTURES: Primary | ICD-10-CM

## 2024-12-26 PROCEDURE — 99284 EMERGENCY DEPT VISIT MOD MDM: CPT | Performed by: EMERGENCY MEDICINE

## 2024-12-26 PROCEDURE — 71250 CT THORAX DX C-: CPT

## 2024-12-26 PROCEDURE — 96372 THER/PROPH/DIAG INJ SC/IM: CPT

## 2024-12-26 PROCEDURE — 99285 EMERGENCY DEPT VISIT HI MDM: CPT

## 2024-12-26 RX ORDER — KETOROLAC TROMETHAMINE 30 MG/ML
15 INJECTION, SOLUTION INTRAMUSCULAR; INTRAVENOUS ONCE
Status: COMPLETED | OUTPATIENT
Start: 2024-12-26 | End: 2024-12-26

## 2024-12-26 RX ORDER — LIDOCAINE 50 MG/G
1 PATCH TOPICAL ONCE
Status: DISCONTINUED | OUTPATIENT
Start: 2024-12-26 | End: 2024-12-26 | Stop reason: HOSPADM

## 2024-12-26 RX ORDER — MORPHINE SULFATE 15 MG/1
7.5 TABLET ORAL EVERY 6 HOURS PRN
Qty: 10 TABLET | Refills: 0 | Status: SHIPPED | OUTPATIENT
Start: 2024-12-26 | End: 2024-12-31

## 2024-12-26 RX ADMIN — KETOROLAC TROMETHAMINE 15 MG: 30 INJECTION, SOLUTION INTRAMUSCULAR; INTRAVENOUS at 11:14

## 2024-12-26 RX ADMIN — LIDOCAINE 1 PATCH: 50 PATCH TOPICAL at 11:14

## 2024-12-26 NOTE — INCIDENTAL FINDINGS
The following findings require follow up:  Radiographic finding   Finding: Redemonstration of 3 mm right lower lobe nodule which will be reevaluated in 1 year lung cancer screening.     Redemonstration of minimal reticulation in the juxtapleural right middle lobe and both lower lobes. These interstitial lung abnormalities could represent early fibrosis and can be   Follow up required: Primary care physician    Follow up should be done within 1 month(s)        Incidental finding results were discussed with the Patient by Vic Harrison MD on 12/26/24.   They expressed understanding and all questions answered.

## 2024-12-26 NOTE — ED ATTENDING ATTESTATION
Final Diagnoses:     1. Multiple rib fractures      ED Course as of 12/26/24 1341   Thu Dec 26, 2024   1341 Patient does not want to stay in the hospital.  She has multiple rib fractures but is pulling 1300 mL on incentive spirometer.  I think pain control and discharge would be reasonable, return to ER precautions.       I, Dinesh Prince MD, saw and evaluated the patient. All available labs and X-rays were ordered by me or the resident / non-physician and have been reviewed by myself. I discussed the patient with the resident / non-physician and agree with the resident's / non-physician practitioner's findings and plan as documented in the resident's / non-physician practicitioner's note, except where noted.   At this point, I agree with the current assessment done in the ED.   I was present during key portions of all procedures performed unless otherwise stated.     HPI:  NURSING TRIAGE:    This is a 79 y.o. female presenting for evaluation of bent over to pick something up, then had LEFT rib pain localized to a single point  Tylenol doesn't help  Pain with breathing  It started abruptly.   No pain [place]where  Non-radiating.   No belly pain  No chest pain anteriorly.  Non-exertional. Chief Complaint   Patient presents with    Flank Pain     Pt states 2 days ago bent over to reach something on the ground and felt a pulling sensation on L flank. C/o pain since      PHYSICAL: ASSESSMENT + PLAN:   General: VS reviewed  Appears in NAD  awake, alert.   Well-nourished, well-developed. Appears stated age.   Speaking normally in full sentences.   Head: Normocephalic, atraumatic  Eyes: EOM-I. No diplopia.   No hyphema.   No subconjunctival hemorrhages.  Symmetrical lids.   ENT: Atraumatic external nose and ears.    MMM  No malocclusion. No stridor. Normal phonation. No drooling. Normal swallowing.   Neck: No JVD.  CV: No pallor noted   There is VERY focal tenderness of a single point  No zoster overlying  it  Proximal/distal anterior/posterior to this focal position, it's tender/pain.  Pleuritic pain elicited.   No skin breaks  No bruising  No clavicular or sternum tenderness.   Lungs:   No tachypnea  No respiratory distress  Abd: soft nt nd no rebound/guarding  MSK:   FROM spontaneously  Skin: Dry, intact.   Neuro: Awake, alert, GCS15, CN II-XII grossly intact.   Motor grossly intact.  Psychiatric/Behavioral: interacting normally; appropriate mood/affect.    Exam: deferred    Vitals:    24 1047   BP: (!) 185/82   Pulse: 75   Resp: 18   Temp: (!) 97.4 °F (36.3 °C)   SpO2: 97%    CT for fx.  Looking for pathologic fx specifically.      There are no obvious limitations to social determinants of care.   Nursing note reviewed.   Vitals reviewed.   Orders placed by myself and/or advanced practitioner / resident.    Previous chart was reviewed  History obtained from: Family and Patient  Language barrier: None  Limitations to the history obtained: None    Past Medical: Past Surgical:    has a past medical history of COPD (chronic obstructive pulmonary disease) (), Fracture (), H/O: hysterectomy (), Shingles, and Stroke ([facility]) ().  has a past surgical history that includes Hysterectomy ().   Social: Cardiac (Echo/Cath)   Social History     Substance and Sexual Activity   Alcohol Use No     Social History     Tobacco Use   Smoking Status Former    Current packs/day: 0.00    Average packs/day: 0.5 packs/day for 40.0 years (20.0 ttl pk-yrs)    Types: Cigarettes    Start date:     Quit date:     Years since quittin.9   Smokeless Tobacco Never     Social History     Substance and Sexual Activity   Drug Use No    No results found for this or any previous visit.    No results found for this or any previous visit.    No results found for this or any previous visit.     Labs: Imaging:   Labs Reviewed - No data to display CT chest without contrast   Final Result      Acute minimally displaced  fractures of the anterolateral left sixth, seventh, and eighth ribs.      No hemothorax or pneumothorax.      Redemonstration of 3 mm right lower lobe nodule which will be reevaluated in 1 year lung cancer screening.      Redemonstration of minimal reticulation in the juxtapleural right middle lobe and both lower lobes. These interstitial lung abnormalities could represent early fibrosis and can be reevaluated at the time of lung cancer screening in 1 year.      The study was marked in EPIC for immediate notification.         Workstation performed: [id number]            Medications: Code Status:   Medications   lidocaine (LIDODERM) 5 % patch 1 patch (1 patch Topical Medication Applied 12/26/24 1114)   ketorolac (TORADOL) injection 15 mg (15 mg Intramuscular Given 12/26/24 1114)    Code Status: No Order  Advance Directive and Living Will:      Power of :    POLST:       Orders Placed This Encounter   Procedures    CT chest without contrast     Time reflects when diagnosis was documented in both MDM as applicable and the Disposition within this note       Time User Action Codes Description Comment    12/26/2024  1:32 PM Vic Harrison [S22.49XA] Multiple rib fractures           ED Disposition       ED Disposition   Discharge    Condition   Stable    Date/Time   Thu Dec 26, 2024  1:33 PM    Comment   Jigna More discharge to home/self care.                   Follow-up Information       Follow up With Specialties Details Why Contact Info    Samina Almaguer MD Family Medicine   [address] [place] [id number] [phone]            Patient's Medications   Discharge Prescriptions    MORPHINE (MSIR) 15 MG TABLET    Take 0.5 tablets (7.5 mg total) by mouth every 6 (six) hours as needed for severe pain (No driving while using.) for up to 5 days Max Daily Amount: 30 mg       Start Date: 12/26/2024End Date: 12/31/2024       Order Dose: 7.5 mg       Quantity: 10 tablet     "Refills: 0     No discharge procedures on file.  Prior to Admission Medications   Prescriptions Last Dose Informant Patient Reported? Taking?   Blood Pressure Monitoring (BLOOD PRESSURE CUFF) MISC  Self No No   Sig: by Does not apply route daily   Patient not taking: Reported on 11/25/2024   Melatonin 5 MG TABS   No No   Sig: Take 1 tablet (5 mg total) by mouth daily at bedtime as needed (sleep issue)   Oyster Shell 500 MG TABS  Self No No   Sig: Take 2 tablets (1,000 mg total) by mouth daily   albuterol (PROVENTIL HFA,VENTOLIN HFA) 90 mcg/act inhaler   No No   Sig: Inhale 1 puff every 6 (six) hours as needed for wheezing   aspirin (ECOTRIN LOW STRENGTH) 81 mg EC tablet   No No   Sig: Take 1 tablet (81 mg total) by mouth daily   budesonide-formoterol (Symbicort) 160-4.5 mcg/act inhaler   No No   Sig: Rinse mouth after use.   hydroCHLOROthiazide 12.5 mg tablet   No No   Sig: Take 1 tablet (12.5 mg total) by mouth daily   loratadine (Allergy Relief) 10 mg tablet   No No   Sig: Take 1 tablet (10 mg total) by mouth daily   meclizine (ANTIVERT) 12.5 MG tablet   No No   Sig: Take 1 tablet (12.5 mg total) by mouth every 12 (twelve) hours as needed for dizziness   prednisoLONE acetate (PRED FORTE) 1 % ophthalmic suspension  Self Yes No   Patient not taking: Reported on 2/17/2023   simethicone (MYLICON,GAS-X) 180 MG capsule  Self No No   Sig: Take 1 capsule (180 mg total) by mouth every 6 (six) hours as needed for flatulence   simvastatin (ZOCOR) 10 mg tablet   No No   Sig: Take 1 tablet (10 mg total) by mouth daily at bedtime      Facility-Administered Medications: None                        Portions of the record may have been created with voice recognition software. Occasional wrong word or \"sound a like\" substitutions may have occurred due to the inherent limitations of voice recognition software. Read the chart carefully and recognize, using context, where substitutions have occurred.    Electronically signed " by:  Dinesh Prince

## 2024-12-26 NOTE — DISCHARGE INSTRUCTIONS
Please follow-up with your primary care physician in 1 week.  It is imperative that if you are unable to to use the incentive spirometer to get at least 1000 that you return to the emergency department for pain control.  If you have any shortness of breath or other concerns please return the emergency department.

## 2024-12-27 ENCOUNTER — TELEPHONE (OUTPATIENT)
Age: 79
End: 2024-12-27
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
## 2024-12-27 NOTE — TELEPHONE ENCOUNTER
Patient had called in and wanted to inform the doctor that she had gone to the ER, and has three fractured ribs.     Please have someone advise out to the patient.

## 2024-12-30 NOTE — TELEPHONE ENCOUNTER
Patient can take anti-inflammatories such as Advil or Motrin if she does not have any allergies or any history of gastrointestinal bleeding.  She can also take Tylenol.  And patient should see an orthopedic for her rib fractures/ER visit also. ty

## 2024-12-30 NOTE — TELEPHONE ENCOUNTER
Called pt and asked her if she wanted to schedule an appointment after her visit at the ER for rib fractures. Pt says its difficult for her to move around so she doesn't want an appointment at this time. Pt said she is taking morphine that they gave her from the ER and that she has 5 1/2 pills left. Pt says she does not want to continue taking this after she's done with them and she wants to know what she could take for pain afterwards.

## 2025-01-10 ENCOUNTER — CONSULT (OUTPATIENT)
Dept: UROLOGY | Facility: AMBULATORY SURGERY CENTER | Age: 80
End: 2025-01-10
Payer: COMMERCIAL

## 2025-01-10 VITALS
OXYGEN SATURATION: 96 % | SYSTOLIC BLOOD PRESSURE: 148 MMHG | DIASTOLIC BLOOD PRESSURE: 78 MMHG | HEART RATE: 72 BPM | WEIGHT: 161 LBS | HEIGHT: 59 IN | BODY MASS INDEX: 32.46 KG/M2

## 2025-01-10 DIAGNOSIS — R63.4 UNINTENTIONAL WEIGHT LOSS: ICD-10-CM

## 2025-01-10 DIAGNOSIS — R35.0 URINARY FREQUENCY: ICD-10-CM

## 2025-01-10 DIAGNOSIS — R31.9 HEMATURIA, UNSPECIFIED TYPE: ICD-10-CM

## 2025-01-10 DIAGNOSIS — R31.29 MICROSCOPIC HEMATURIA: Primary | ICD-10-CM

## 2025-01-10 LAB
POST-VOID RESIDUAL VOLUME, ML POC: 27 ML
SL AMB  POCT GLUCOSE, UA: NORMAL
SL AMB LEUKOCYTE ESTERASE,UA: NORMAL
SL AMB POCT BILIRUBIN,UA: NORMAL
SL AMB POCT BLOOD,UA: NORMAL
SL AMB POCT CLARITY,UA: NORMAL
SL AMB POCT COLOR,UA: YELLOW
SL AMB POCT KETONES,UA: NORMAL
SL AMB POCT NITRITE,UA: NORMAL
SL AMB POCT PH,UA: 6.5
SL AMB POCT SPECIFIC GRAVITY,UA: 1.01
SL AMB POCT URINE PROTEIN: NORMAL
SL AMB POCT UROBILINOGEN: 0.2

## 2025-01-10 PROCEDURE — 81002 URINALYSIS NONAUTO W/O SCOPE: CPT

## 2025-01-10 PROCEDURE — 99204 OFFICE O/P NEW MOD 45 MIN: CPT

## 2025-01-10 PROCEDURE — 51798 US URINE CAPACITY MEASURE: CPT

## 2025-01-10 RX ORDER — TROSPIUM CHLORIDE 20 MG/1
20 TABLET, FILM COATED ORAL 2 TIMES DAILY
Qty: 60 TABLET | Refills: 4 | Status: SHIPPED | OUTPATIENT
Start: 2025-01-10

## 2025-01-10 NOTE — Clinical Note
Rudy, I saw Jigna today in the office for shavonne barry, I am ordering a full work up. But she was complaining that she is overall not feeling well, it does appear she has lost about 15 pounds over the last year without trying and she is reporting that over the last week she has had some heart palpitations at night that have been waking her up, I told her to call your office to follow up with these couple of things. I am getting a CT scan to assess for any concerns in the urinary tract and she is scheduled for a cysto in a couple of weeks. Thanks.

## 2025-01-10 NOTE — ASSESSMENT & PLAN NOTE
Patient does report an unintentional weight loss between 12 and 15 pounds over the last year.  She states that she has not made any significant diet adjustments over the last year.  I did review her weights and in March 2024 she did weigh approximately 174 pounds, at today's office visit she does weigh 161 pounds.  Planning to order a CT renal protocol to assess for any underlying malignancy that may be contributing to the unintentional weight loss.  She also should follow-up with her PCP regarding this.

## 2025-01-10 NOTE — ASSESSMENT & PLAN NOTE
Urinalysis from September 2024 in January 2024 both demonstrating 4-10 RBCs, this does not correlate with any urine cultures that were positive for bacteria  UA in office today with trace leukocytes and moderate blood, no glucose or ketones noted, will send for microscopic urinalysis and urine culture  Kidney and bladder ultrasound from 12/10/2024 with no abnormalities noted within the urinary tract  Patient does have an extensive smoking history  Plan to obtain CT urogram now, will call patient with results  Plan to have cystoscopy by physician for further evaluation of the bladder to determine if there are any abnormalities such as bladder tumors that are contributing to the microscopic hematuria and severe bothersome lower urinary tract symptoms  Orders:    Ambulatory Referral to Urology    POCT Measure PVR    POCT urine dip    CT renal protocol; Future    Basic metabolic panel; Future

## 2025-01-10 NOTE — PROGRESS NOTES
Name: Jigna More      : 1945      MRN: [id number]  Encounter Provider: JUAN Lujan  Encounter Date: 1/10/2025   Encounter department: [facility]OLOGY [place]  :  Assessment & Plan  Microscopic hematuria  Urinalysis from 2024 in 2024 both demonstrating 4-10 RBCs, this does not correlate with any urine cultures that were positive for bacteria  UA in office today with trace leukocytes and moderate blood, no glucose or ketones noted, will send for microscopic urinalysis and urine culture  Kidney and bladder ultrasound from 12/10/2024 with no abnormalities noted within the urinary tract  Patient does have an extensive smoking history  Plan to obtain CT urogram now, will call patient with results  Plan to have cystoscopy by physician for further evaluation of the bladder to determine if there are any abnormalities such as bladder tumors that are contributing to the microscopic hematuria and severe bothersome lower urinary tract symptoms  Orders:    Ambulatory Referral to Urology    POCT Measure PVR    POCT urine dip    CT renal protocol; Future    Basic metabolic panel; Future    Urinary frequency  Patient reports having increased urinary frequency during the daytime and nocturia 3-5 times per night that started 2 to 3 months ago.  She states that she does try and stay well-hydrated that she feels as though she urinates a lot during the day.  She is a prediabetic and there is no glucose noted in her urine today, her last hemoglobin A1c was reported at 5.9 in 2024.  We can try a trial of trospium 20 mg twice daily to see if this helps with her urinary symptoms, side effect profile discussed.  PVR in office today 27 mL so no concern for urinary retention.  Orders:    trospium chloride (SANCTURA) 20 mg tablet; Take 1 tablet (20 mg total) by mouth 2 (two) times a day    Unintentional weight loss  Patient does report an unintentional weight loss  between 12 and 15 pounds over the last year.  She states that she has not made any significant diet adjustments over the last year.  I did review her weights and in March 2024 she did weigh approximately 174 pounds, at today's office visit she does weigh 161 pounds.  Planning to order a CT renal protocol to assess for any underlying malignancy that may be contributing to the unintentional weight loss.  She also should follow-up with her PCP regarding this.             History of Present Illness   Jigna More is a 79 y.o. female who presents today to the office as a new patient for further evaluation of microscopic hematuria and urinary frequency.    She states that over the last 2 to 3 months she has noticed severe worsening of urinary frequency.  She states that she goes to the bathroom every hour during the day and has nocturia 3-5 times per night.  She states she does try and stay adequately hydrated with water but feels as though she is urinating too much and is becoming dehydrated.  She also reports that she will occasionally have burning with urination.  She has no positive urine cultures on file.  She denies any gross hematuria.  She does report an extensive smoking history.  She reports that she started smoking at a very young age and was smoked anywhere from 10 to 12 cigarettes/day.  She reports that she did quit approximately 15 years ago.    She also reports that she overall has not been feeling very well.  She states that over the last week she has been having heart palpitations.  She also reports pain in her right shoulder.  She also reports that she has had unintentional weight loss of 15 pounds over the last year.  She is very concerned about all these issues.  She states that she does not feel right and she feels as though something is wrong.  She states that she felt as though she was going to go to the ER today for further evaluation because she was not feeling very well.  Her vital  "signs today in the office do look good, her heart rate is within normal limits and blood pressure is not elevated.    Review of Systems   Constitutional:  Positive for unexpected weight change. Negative for chills and fever.   Respiratory: Negative.  Negative for cough and shortness of breath.    Cardiovascular:  Positive for palpitations. Negative for chest pain and leg swelling.   Genitourinary:  Positive for frequency and urgency. Negative for dyspareunia, dysuria, flank pain, hematuria, menstrual problem, pelvic pain, vaginal bleeding, vaginal discharge and vaginal pain.   Musculoskeletal:  Positive for arthralgias.   Skin:  Negative for rash.   Neurological: Negative.    Hematological:  Negative for adenopathy. Does not bruise/bleed easily.   Psychiatric/Behavioral:  The patient is nervous/anxious.           Objective   /78 (BP Location: Left arm, Patient Position: Sitting, Cuff Size: Standard)   Pulse 72   Ht 4' 11\" (1.499 m)   Wt 73 kg (161 lb)   SpO2 96%   BMI 32.52 kg/m²     Physical Exam  Vitals reviewed.   Constitutional:       Appearance: Normal appearance.   HENT:      Head: Normocephalic and atraumatic.   Eyes:      Pupils: Pupils are equal, round, and reactive to light.   Cardiovascular:      Rate and Rhythm: Normal rate.   Pulmonary:      Effort: Pulmonary effort is normal.   Musculoskeletal:      Cervical back: Normal range of motion.   Skin:     General: Skin is warm and dry.   Neurological:      General: No focal deficit present.      Mental Status: She is alert and oriented to person, place, and time. Mental status is at baseline.   Psychiatric:         Mood and Affect: Mood normal.         Behavior: Behavior normal.         Thought Content: Thought content normal.         Judgment: Judgment normal.          Results  No results found for: \"PSA\"  Lab Results   Component Value Date    CALCIUM 9.2 09/16/2024    K 4.0 09/16/2024    CO2 29 09/16/2024     09/16/2024    BUN 19 " 09/16/2024    CREATININE 0.62 09/16/2024     Lab Results   Component Value Date    WBC 8.24 09/16/2024    HGB 13.1 09/16/2024    HCT 41.9 09/16/2024    MCV 92 09/16/2024     09/16/2024       Office Urine Dip  No results found for this or any previous visit (from the past hour).]

## 2025-01-10 NOTE — ASSESSMENT & PLAN NOTE
Patient reports having increased urinary frequency during the daytime and nocturia 3-5 times per night that started 2 to 3 months ago.  She states that she does try and stay well-hydrated that she feels as though she urinates a lot during the day.  She is a prediabetic and there is no glucose noted in her urine today, her last hemoglobin A1c was reported at 5.9 in September 2024.  We can try a trial of trospium 20 mg twice daily to see if this helps with her urinary symptoms, side effect profile discussed.  PVR in office today 27 mL so no concern for urinary retention.  Orders:    trospium chloride (SANCTURA) 20 mg tablet; Take 1 tablet (20 mg total) by mouth 2 (two) times a day

## 2025-01-13 ENCOUNTER — TELEPHONE (OUTPATIENT)
Dept: UROLOGY | Facility: AMBULATORY SURGERY CENTER | Age: 80
End: 2025-01-13

## 2025-01-14 ENCOUNTER — APPOINTMENT (OUTPATIENT)
Dept: LAB | Facility: CLINIC | Age: 80
End: 2025-01-14
Payer: COMMERCIAL

## 2025-01-14 DIAGNOSIS — R31.29 MICROSCOPIC HEMATURIA: ICD-10-CM

## 2025-01-14 LAB
ANION GAP SERPL CALCULATED.3IONS-SCNC: 11 MMOL/L (ref 4–13)
BUN SERPL-MCNC: 21 MG/DL (ref 5–25)
CALCIUM SERPL-MCNC: 9.1 MG/DL (ref 8.4–10.2)
CHLORIDE SERPL-SCNC: 102 MMOL/L (ref 96–108)
CO2 SERPL-SCNC: 27 MMOL/L (ref 21–32)
CREAT SERPL-MCNC: 0.66 MG/DL (ref 0.6–1.3)
GFR SERPL CREATININE-BSD FRML MDRD: 84 ML/MIN/1.73SQ M
GLUCOSE P FAST SERPL-MCNC: 102 MG/DL (ref 65–99)
POTASSIUM SERPL-SCNC: 3.9 MMOL/L (ref 3.5–5.3)
SODIUM SERPL-SCNC: 140 MMOL/L (ref 135–147)

## 2025-01-14 PROCEDURE — 80048 BASIC METABOLIC PNL TOTAL CA: CPT

## 2025-01-14 PROCEDURE — 36415 COLL VENOUS BLD VENIPUNCTURE: CPT

## 2025-01-15 ENCOUNTER — HOSPITAL ENCOUNTER (OUTPATIENT)
Dept: RADIOLOGY | Facility: HOSPITAL | Age: 80
Discharge: HOME | End: 2025-01-15
Payer: COMMERCIAL

## 2025-01-15 DIAGNOSIS — R31.29 MICROSCOPIC HEMATURIA: ICD-10-CM

## 2025-01-15 LAB — COLOGUARD RESULT REPORTABLE: NEGATIVE

## 2025-01-15 PROCEDURE — 74178 CT ABD&PLV WO CNTR FLWD CNTR: CPT

## 2025-01-15 RX ADMIN — IOHEXOL 50 ML: 350 INJECTION, SOLUTION INTRAVENOUS at 10:56

## 2025-01-16 ENCOUNTER — PROCEDURE VISIT (OUTPATIENT)
Dept: UROLOGY | Facility: AMBULATORY SURGERY CENTER | Age: 80
End: 2025-01-16
Payer: COMMERCIAL

## 2025-01-16 VITALS
DIASTOLIC BLOOD PRESSURE: 62 MMHG | WEIGHT: 158 LBS | OXYGEN SATURATION: 96 % | SYSTOLIC BLOOD PRESSURE: 122 MMHG | BODY MASS INDEX: 31.85 KG/M2 | HEART RATE: 65 BPM | HEIGHT: 59 IN

## 2025-01-16 DIAGNOSIS — R35.0 URINARY FREQUENCY: ICD-10-CM

## 2025-01-16 DIAGNOSIS — R31.29 MICROSCOPIC HEMATURIA: Primary | ICD-10-CM

## 2025-01-16 LAB
SL AMB  POCT GLUCOSE, UA: NORMAL
SL AMB LEUKOCYTE ESTERASE,UA: NORMAL
SL AMB POCT BILIRUBIN,UA: NORMAL
SL AMB POCT BLOOD,UA: NORMAL
SL AMB POCT CLARITY,UA: CLEAR
SL AMB POCT COLOR,UA: YELLOW
SL AMB POCT KETONES,UA: NORMAL
SL AMB POCT NITRITE,UA: NORMAL
SL AMB POCT PH,UA: 5
SL AMB POCT SPECIFIC GRAVITY,UA: 1
SL AMB POCT URINE PROTEIN: NORMAL
SL AMB POCT UROBILINOGEN: 0.2

## 2025-01-16 PROCEDURE — 81002 URINALYSIS NONAUTO W/O SCOPE: CPT

## 2025-01-16 PROCEDURE — 52000 CYSTOURETHROSCOPY: CPT

## 2025-01-16 PROCEDURE — 88112 CYTOPATH CELL ENHANCE TECH: CPT | Performed by: PATHOLOGY

## 2025-01-16 NOTE — PROGRESS NOTES
Cystoscopy     Date/Time  1/16/2025 7:30 AM     Performed by  Chang France MD   Authorized by  Chang France MD     Universal Protocol:  procedure performed by consultantConsent: Verbal consent obtained. Written consent obtained.  Risks and benefits: risks, benefits and alternatives were discussed  Consent given by: patient  Patient understanding: patient states understanding of the procedure being performed  Patient consent: the patient's understanding of the procedure matches consent given  Procedure consent: procedure consent matches procedure scheduled  Relevant documents: relevant documents present and verified  Test results: test results available and properly labeled  Site marked: the operative site was marked  Patient identity confirmed: verbally with patient      Procedure Details:  Procedure type: cystoscopy    Patient tolerance: Patient tolerated the procedure well with no immediate complications    Additional Procedure Details: Office Cystoscopy Procedure Note    Indication:    Hematuria    Informed consent   The risks, benefits, complications, treatment options, and expected outcomes were discussed with the patient. The patient concurred with the proposed plan and provided informed consent.    Anesthesia  Lidocaine jelly 2%    Antibiotic prophylaxis   None    Procedure  In the presence of a female nurse, the patient was placed in the supine position with knees laid to the side, was prepped and draped in the usual manner using sterile technique, and 2% lidocaine jelly instilled into the urethra. Prior to this pelvic examination showed normal labia majora and minora, a normal vaginal introitus, and mildly atrophic vaginal mucosa. Upon stress maneuvers there was no stress incontinence.  A 17 F flexible cystoscope was then inserted into the urethra and the urethra and bladder carefully examined.  The following findings were noted:    Findings:  Urethra:  Normal  Bladder:  2 cm area of subtle and  mild erythema at the left lateral bladder wall. Not friable upon agitation with the scope. No papillary changes  Ureteral orifices:  Normal  Other findings:  None, retroflexed view confirms    Specimens: None                 Complications:    None; patient tolerated the procedure well           Disposition: To home            Condition: Stable    Plan:  CT Urogram reviewed. No  abnormalities noted. Final read pending.     Bladder erythema is subtle although focal. Recommend repeating urinalysis in 3-6 months and considering repeat cystoscopy if with persistent microscopic hematuria. If erythema remains, can proceed with biopsy in the OR to rule out carcinoma in situ.     Urine cytology sent today.     She will follow up in 3 months for management of her OAB and urine check

## 2025-01-16 NOTE — LETTER
January 16, 2025     JUAN Lujan  [address] [place] [address]   [address] [place] [id number]    Patient: Jigna More   YOB: 1945   Date of Visit: 1/16/2025           Thank you for referring Jigna More to me for evaluation. Below are my notes for this consultation.    If you have questions, please do not hesitate to call me. I look forward to following your patient along with you.         Sincerely,        Chang France MD        CC: No Recipients    Chang France MD  1/16/2025  9:33 AM  Sign when Signing Visit     Cystoscopy     Date/Time  1/16/2025 7:30 AM     Performed by  Chang France MD   Authorized by  Chang France MD     Universal Protocol:  procedure performed by consultantConsent: Verbal consent obtained. Written consent obtained.  Risks and benefits: risks, benefits and alternatives were discussed  Consent given by: patient  Patient understanding: patient states understanding of the procedure being performed  Patient consent: the patient's understanding of the procedure matches consent given  Procedure consent: procedure consent matches procedure scheduled  Relevant documents: relevant documents present and verified  Test results: test results available and properly labeled  Site marked: the operative site was marked  Patient identity confirmed: verbally with patient      Procedure Details:  Procedure type: cystoscopy    Patient tolerance: Patient tolerated the procedure well with no immediate complications    Additional Procedure Details: Office Cystoscopy Procedure Note    Indication:    Hematuria    Informed consent   The risks, benefits, complications, treatment options, and expected outcomes were discussed with the patient. The patient concurred with the proposed plan and provided informed consent.    Anesthesia  Lidocaine jelly 2%    Antibiotic prophylaxis   None    Procedure  In the presence of a female nurse, the patient was placed in the  supine position with knees laid to the side, was prepped and draped in the usual manner using sterile technique, and 2% lidocaine jelly instilled into the urethra. Prior to this pelvic examination showed normal labia majora and minora, a normal vaginal introitus, and mildly atrophic vaginal mucosa. Upon stress maneuvers there was no stress incontinence.  A 17 F flexible cystoscope was then inserted into the urethra and the urethra and bladder carefully examined.  The following findings were noted:    Findings:  Urethra:  Normal  Bladder:  2 cm area of subtle and mild erythema at the left lateral bladder wall. Not friable upon agitation with the scope. No papillary changes  Ureteral orifices:  Normal  Other findings:  None, retroflexed view confirms    Specimens: None                 Complications:    None; patient tolerated the procedure well           Disposition: To home            Condition: Stable    Plan:  CT Urogram reviewed. No  abnormalities noted. Final read pending.     Bladder erythema is subtle although focal. Recommend repeating urinalysis in 3-6 months and considering repeat cystoscopy if with persistent microscopic hematuria. If erythema remains, can proceed with biopsy in the OR to rule out carcinoma in situ.     Urine cytology sent today.     She will follow up in 3 months for management of her OAB and urine check

## 2025-01-16 NOTE — TELEPHONE ENCOUNTER
No alternative medication is needed at this time.  Patient should continue taking the medication that was prescribed to her a few days ago.  We will assess in a couple of months if the medication is effective.  It sometimes can take 4 to 6 weeks to see benefit from taking the medication.

## 2025-01-16 NOTE — PATIENT INSTRUCTIONS
Continue taking the medication that was prescribed to you last visit. This may take a few weeks to come to full effect.

## 2025-01-16 NOTE — TELEPHONE ENCOUNTER
Patient called stating she just saw Dr. France today and they discuss the medication she is taking and she stated the doctor told her he was going to sent another medication.  Please review and call patient.    Pt can be reached at [phone]

## 2025-01-17 ENCOUNTER — RESULTS FOLLOW-UP (OUTPATIENT)
Dept: OBGYN CLINIC | Facility: CLINIC | Age: 80
End: 2025-01-17

## 2025-01-17 NOTE — TELEPHONE ENCOUNTER
LM that Per Dr France should keep using medication for 3 months that Kelly ordered as it usually takes 3 months to see improve.  If it not working at that time can reorder another medication

## 2025-01-20 PROCEDURE — 88112 CYTOPATH CELL ENHANCE TECH: CPT | Performed by: PATHOLOGY

## 2025-01-21 ENCOUNTER — RESULTS FOLLOW-UP (OUTPATIENT)
Dept: UROLOGY | Facility: AMBULATORY SURGERY CENTER | Age: 80
End: 2025-01-21

## 2025-01-21 DIAGNOSIS — R59.9 ENLARGED LYMPH NODE: Primary | ICD-10-CM

## 2025-01-21 NOTE — TELEPHONE ENCOUNTER
I called Jigna and reviewed her most recent CT urogram with her.  Everything within the urinary tract overall looks very good.  She does have an enlarging lymph node in the right iliac area that is measuring 1.5 cm in size.  This is increased from her prior imaging.  Due to this I am recommending that she had a biopsy of this lymph node.  I will place a referral for interventional radiology.  I also discussed with her about moving up her appointment with Dr. France to have a repeat cystoscopy in March.  Due to this she can cancel her appointment with me in April.  Can you please assist her in scheduling a cystoscopy in March.

## 2025-01-22 NOTE — TELEPHONE ENCOUNTER
Called the patient to inform her of the scheduled appointment, she responded thank-you it's on 3-.  Date: 3/19/2025       Appointment Information, mailed to the patient's home.

## 2025-01-22 NOTE — TELEPHONE ENCOUNTER
----- Message from JUAN Lujan sent at 1/21/2025 12:33 PM EST -----  Please assist in scheduling cysto with Dr. France in March, thanks!

## 2025-01-23 ENCOUNTER — TELEPHONE (OUTPATIENT)
Dept: UROLOGY | Facility: AMBULATORY SURGERY CENTER | Age: 80
End: 2025-01-23

## 2025-01-23 DIAGNOSIS — R59.9 ENLARGED LYMPH NODE: Primary | ICD-10-CM

## 2025-01-23 NOTE — TELEPHONE ENCOUNTER
Can you please call Jigna and let her know that we spoke with the interventional radiology doctor and due to the location of her lymph node they will not be able to biopsy it. We will plan to repeat a CT scan in approximately 3 months to make sure that there is no rapid growth of the lymph node and ensure that it is staying stable. It has only grown a small amount over the last year so that is very reassuring.

## 2025-01-23 NOTE — TELEPHONE ENCOUNTER
Called and spoke with patient. Informed on AP's note. Provided central scheduling's number. Informed to get CT in 3 months. She verbalized understanding.

## 2025-02-24 ENCOUNTER — APPOINTMENT (OUTPATIENT)
Dept: LAB | Facility: CLINIC | Age: 80
End: 2025-02-24
Payer: COMMERCIAL

## 2025-03-04 ENCOUNTER — OFFICE VISIT (OUTPATIENT)
Dept: FAMILY MEDICINE CLINIC | Facility: CLINIC | Age: 80
End: 2025-03-04
Payer: COMMERCIAL

## 2025-03-04 VITALS
TEMPERATURE: 97.1 F | HEART RATE: 82 BPM | DIASTOLIC BLOOD PRESSURE: 70 MMHG | OXYGEN SATURATION: 97 % | HEIGHT: 59 IN | BODY MASS INDEX: 32.66 KG/M2 | WEIGHT: 162 LBS | SYSTOLIC BLOOD PRESSURE: 122 MMHG | RESPIRATION RATE: 18 BRPM

## 2025-03-04 DIAGNOSIS — J44.9 CHRONIC OBSTRUCTIVE PULMONARY DISEASE, UNSPECIFIED COPD TYPE (HCC): ICD-10-CM

## 2025-03-04 DIAGNOSIS — I10 ESSENTIAL HYPERTENSION: ICD-10-CM

## 2025-03-04 DIAGNOSIS — R73.03 PREDIABETES: Primary | ICD-10-CM

## 2025-03-04 DIAGNOSIS — N30.01 ACUTE CYSTITIS WITH HEMATURIA: ICD-10-CM

## 2025-03-04 DIAGNOSIS — E78.2 MIXED HYPERLIPIDEMIA: ICD-10-CM

## 2025-03-04 DIAGNOSIS — E66.811 CLASS 1 DRUG-INDUCED OBESITY WITHOUT SERIOUS COMORBIDITY WITH BODY MASS INDEX (BMI) OF 32.0 TO 32.9 IN ADULT: ICD-10-CM

## 2025-03-04 DIAGNOSIS — Z87.891 EX-SMOKER: ICD-10-CM

## 2025-03-04 DIAGNOSIS — E66.1 CLASS 1 DRUG-INDUCED OBESITY WITHOUT SERIOUS COMORBIDITY WITH BODY MASS INDEX (BMI) OF 32.0 TO 32.9 IN ADULT: ICD-10-CM

## 2025-03-04 PROCEDURE — 99214 OFFICE O/P EST MOD 30 MIN: CPT | Performed by: FAMILY MEDICINE

## 2025-03-04 PROCEDURE — G2211 COMPLEX E/M VISIT ADD ON: HCPCS | Performed by: FAMILY MEDICINE

## 2025-03-04 RX ORDER — NITROFURANTOIN 25; 75 MG/1; MG/1
100 CAPSULE ORAL 2 TIMES DAILY
Qty: 14 CAPSULE | Refills: 0 | Status: SHIPPED | OUTPATIENT
Start: 2025-03-04 | End: 2025-03-11

## 2025-03-04 NOTE — ASSESSMENT & PLAN NOTE
Controlled.  Her glucose was 99.  But her A1c went up to 6.0 now when it was 5.9 in September 2024.  Advised patient to have less starches and sweets and fats.  Lose weight with a better diet and exercise.  And recheck labs again in 6 months.  Orders:  •  Comprehensive metabolic panel; Future  •  TSH, 3rd generation with Free T4 reflex; Future  •  Hemoglobin A1C; Future  •  Albumin / creatinine urine ratio; Future

## 2025-03-04 NOTE — ASSESSMENT & PLAN NOTE
Controlled.  LFTs normal.  Her total cholesterol is 142 which went up from 134 September last year, triglycerides remain the same at 69, HDL went up to 57 from 53 and her LDL went up to 71 from 67.  Patient was reminded that her LDL needs to be less than 70.  Cut down on her fats starches and sweets.  Lose weight with a better diet and exercise.  Continue current therapy.  Recheck labs in 6 months.  Orders:  •  Comprehensive metabolic panel; Future  •  Lipid panel; Future

## 2025-03-04 NOTE — ASSESSMENT & PLAN NOTE
Obesity with a BMI of 32.7.  Discussed with patient.  Patient advised to lose weight with a better diet and exercise as tolerated.  Continue to monitor and follow-up.

## 2025-03-04 NOTE — ASSESSMENT & PLAN NOTE
Controlled and stable.  Advised patient to lose weight.  Hydrate well.  Low-sodium diet.  Continue current therapy.  Labs reviewed.  Recheck labs in 6 months also.  Orders:  •  CBC and differential; Future  •  Comprehensive metabolic panel; Future  •  UA w Reflex to Microscopic w Reflex to Culture; Future

## 2025-03-04 NOTE — PROGRESS NOTES
Name: Jigna More      : 1945      MRN: [id number]  Encounter Provider: Samina Almaguer MD  Encounter Date: 3/4/2025   Encounter department: [facility]  :  Assessment & Plan  Prediabetes  Controlled.  Her glucose was 99.  But her A1c went up to 6.0 now when it was 5.9 in 2024.  Advised patient to have less starches and sweets and fats.  Lose weight with a better diet and exercise.  And recheck labs again in 6 months.  Orders:  •  Comprehensive metabolic panel; Future  •  TSH, 3rd generation with Free T4 reflex; Future  •  Hemoglobin A1C; Future  •  Albumin / creatinine urine ratio; Future    Essential hypertension  Controlled and stable.  Advised patient to lose weight.  Hydrate well.  Low-sodium diet.  Continue current therapy.  Labs reviewed.  Recheck labs in 6 months also.  Orders:  •  CBC and differential; Future  •  Comprehensive metabolic panel; Future  •  UA w Reflex to Microscopic w Reflex to Culture; Future    Mixed hyperlipidemia  Controlled.  LFTs normal.  Her total cholesterol is 142 which went up from 134 September last year, triglycerides remain the same at 69, HDL went up to 57 from 53 and her LDL went up to 71 from 67.  Patient was reminded that her LDL needs to be less than 70.  Cut down on her fats starches and sweets.  Lose weight with a better diet and exercise.  Continue current therapy.  Recheck labs in 6 months.  Orders:  •  Comprehensive metabolic panel; Future  •  Lipid panel; Future    Acute cystitis with hematuria  No acute distress.  Discussed with patient.  Patient prescribed the antibiotic below from the culture and sensitivity report of this urinalysis.  Patient vies take antibiotic with food.  Take a probiotic.  Drink a lot of water.  And then recheck her urine for 5 days after she is on the antibiotic.  Of note she will be seeing the urologist in about 2 weeks for her history of hematuria.  Orders:  •  nitrofurantoin  (MACROBID) 100 mg capsule; Take 1 capsule (100 mg total) by mouth 2 (two) times a day for 7 days  •  UA w Reflex to Microscopic w Reflex to Culture; Future    Chronic obstructive pulmonary disease, unspecified COPD type (HCC)  Stable on her current treatment plan which contains of maintenance inhalers and an albuterol inhaler as a rescue inhaler.  Patient asserts that she uses her maintenance inhalers routinely.  And that she hardly needs to use the albuterol inhaler as a rescue.  Patient no longer smokes.  Continue to monitor and follow-up.       Ex-smoker  Discussed with patient.  Patient's last CT of the lung in December last year was stable.  She will get another 1 in December of this year.       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  Discussed with patient.  Patient advised to lose weight with a better diet and exercise as tolerated.  Continue to monitor and follow-up.             RTO 6 months and do the blood work and urine before.  Patient can see me prior to that for anything else in between.      Lung Cancer Screening Shared Decision Making: I discussed with her that she is a candidate for lung cancer CT screening.     The following Shared Decision-Making points were covered:  Benefits of screening were discussed, including the rates of reduction in death from lung cancer and other causes.  Harms of screening were reviewed, including false positive tests, radiation exposure levels, risks of invasive procedures, risks of complications of screening, and risk of overdiagnosis.  I counseled on the importance of adherence to annual lung cancer LDCT screening, impact of co-morbidities, and ability or willingness to undergo diagnosis and treatment.  I counseled on the importance of maintaining abstinence as a former smoker or was counseled on the importance of smoking cessation if a current smoker    Review of  "Eligibility Criteria: She meets all of the criteria for Lung Cancer Screening.   - She is 79 y.o.   - She has 20 pack year tobacco history and is a current smoker or has quit within the past 15 years  - She presents no signs or symptoms of lung cancer    After discussion, the patient decided to elect lung cancer screening.      History of Present Illness   79-year-old female here to be evaluated for her prediabetes, hyperlipidemia and hypertension.  Patient did blood work and urine recently.  Patient does have mild UTI symptoms.  Of note she has a history of hematuria for which she is going to see the urologist soon this month.  Patient denies tobacco.  Her last CT of the lung was in December 2024.  And it was stable.  Patient walked in fast hence her blood pressure was slightly elevated but when I rechecked it was 122/70.  Patient does have COPD and says it stable on her chronic maintenance inhalers.  And she hardly needs to use the albuterol inhaler as needed.  She does carry it in her purse with her.      Review of Systems   Constitutional:  Negative for chills, fatigue and fever.   HENT:  Negative for congestion and sore throat.    Eyes:  Negative for visual disturbance.   Respiratory:  Negative for cough and shortness of breath.    Cardiovascular:  Negative for chest pain and palpitations.   Gastrointestinal:  Negative for abdominal pain, blood in stool, nausea and vomiting.   Genitourinary:  Positive for dysuria and hematuria.   Neurological:  Negative for dizziness and headaches.   Psychiatric/Behavioral:  Negative for dysphoric mood. The patient is not nervous/anxious.        Objective   /70   Pulse 82   Temp (!) 97.1 °F (36.2 °C) (Temporal)   Resp 18   Ht 4' 11\" (1.499 m)   Wt 73.5 kg (162 lb)   SpO2 97%   BMI 32.72 kg/m²      Physical Exam  Vitals reviewed.   Constitutional:       General: She is not in acute distress.     Appearance: Normal appearance. She is obese. She is not ill-appearing. "   HENT:      Mouth/Throat:      Mouth: Mucous membranes are moist.   Neck:      Vascular: No carotid bruit.   Cardiovascular:      Rate and Rhythm: Normal rate and regular rhythm.   Pulmonary:      Effort: Pulmonary effort is normal.      Breath sounds: Normal breath sounds.   Abdominal:      General: Bowel sounds are normal.      Palpations: Abdomen is soft.      Tenderness: There is no abdominal tenderness. There is no right CVA tenderness or left CVA tenderness.   Musculoskeletal:      Right lower leg: No edema.      Left lower leg: No edema.      Comments: No calf tenderness bilateral.   Lymphadenopathy:      Cervical: No cervical adenopathy.   Skin:     General: Skin is warm.   Neurological:      General: No focal deficit present.      Mental Status: She is alert and oriented to person, place, and time.   Psychiatric:         Mood and Affect: Mood normal.         Behavior: Behavior normal.         Thought Content: Thought content normal.

## 2025-03-04 NOTE — ASSESSMENT & PLAN NOTE
Discussed with patient.  Patient's last CT of the lung in December last year was stable.  She will get another 1 in December of this year.

## 2025-03-04 NOTE — ASSESSMENT & PLAN NOTE
Stable on her current treatment plan which contains of maintenance inhalers and an albuterol inhaler as a rescue inhaler.  Patient asserts that she uses her maintenance inhalers routinely.  And that she hardly needs to use the albuterol inhaler as a rescue.  Patient no longer smokes.  Continue to monitor and follow-up.

## 2025-03-17 ENCOUNTER — APPOINTMENT (OUTPATIENT)
Dept: LAB | Facility: CLINIC | Age: 80
End: 2025-03-17
Payer: COMMERCIAL

## 2025-03-17 DIAGNOSIS — N30.01 ACUTE CYSTITIS WITH HEMATURIA: ICD-10-CM

## 2025-03-17 DIAGNOSIS — I10 ESSENTIAL HYPERTENSION: ICD-10-CM

## 2025-03-17 DIAGNOSIS — E78.2 MIXED HYPERLIPIDEMIA: ICD-10-CM

## 2025-03-17 DIAGNOSIS — R73.03 PREDIABETES: ICD-10-CM

## 2025-03-17 LAB
BACTERIA UR QL AUTO: NORMAL /HPF
BILIRUB UR QL STRIP: NEGATIVE
CLARITY UR: CLEAR
COLOR UR: ABNORMAL
CREAT UR-MCNC: 43.9 MG/DL
GLUCOSE UR STRIP-MCNC: NEGATIVE MG/DL
HGB UR QL STRIP.AUTO: NEGATIVE
KETONES UR STRIP-MCNC: NEGATIVE MG/DL
LEUKOCYTE ESTERASE UR QL STRIP: ABNORMAL
MICROALBUMIN UR-MCNC: <7 MG/L
NITRITE UR QL STRIP: NEGATIVE
NON-SQ EPI CELLS URNS QL MICRO: NORMAL /HPF
PH UR STRIP.AUTO: 6.5 [PH]
PROT UR STRIP-MCNC: NEGATIVE MG/DL
RBC #/AREA URNS AUTO: NORMAL /HPF
SP GR UR STRIP.AUTO: 1.01 (ref 1–1.03)
TRANS CELLS #/AREA URNS HPF: PRESENT /[HPF]
UROBILINOGEN UR STRIP-ACNC: <2 MG/DL
WBC #/AREA URNS AUTO: NORMAL /HPF

## 2025-03-17 PROCEDURE — 82570 ASSAY OF URINE CREATININE: CPT

## 2025-03-17 PROCEDURE — 82043 UR ALBUMIN QUANTITATIVE: CPT

## 2025-03-17 PROCEDURE — 81001 URINALYSIS AUTO W/SCOPE: CPT

## 2025-03-18 ENCOUNTER — RESULTS FOLLOW-UP (OUTPATIENT)
Dept: FAMILY MEDICINE CLINIC | Facility: CLINIC | Age: 80
End: 2025-03-18

## 2025-03-18 NOTE — TELEPHONE ENCOUNTER
----- Message from Samina Almaguer MD sent at 3/18/2025  1:27 PM EDT -----  Please call the patient regarding her abnormal result.  Asked the patient if she has any UTI symptoms.  Thank you

## 2025-03-18 NOTE — RESULT ENCOUNTER NOTE
Please call the patient regarding her abnormal result.  Asked the patient if she has any UTI symptoms.  Thank you

## 2025-03-22 NOTE — PROGRESS NOTES
Cystoscopy     Date/Time  3/24/2025 1:30 PM     Performed by  Chang France MD   Authorized by  Chang France MD     Universal Protocol:  procedure performed by consultantConsent: Verbal consent obtained. Written consent obtained.  Risks and benefits: risks, benefits and alternatives were discussed  Consent given by: patient  Patient understanding: patient states understanding of the procedure being performed  Patient consent: the patient's understanding of the procedure matches consent given  Procedure consent: procedure consent matches procedure scheduled  Relevant documents: relevant documents present and verified  Test results: test results available and properly labeled  Site marked: the operative site was marked  Patient identity confirmed: verbally with patient      Procedure Details:  Procedure type: cystoscopy    Additional Procedure Details: Office Cystoscopy Procedure Note    Indication:    Hematuria    Informed consent   The risks, benefits, complications, treatment options, and expected outcomes were discussed with the patient. The patient concurred with the proposed plan and provided informed consent.    Anesthesia  Lidocaine jelly 2%    Antibiotic prophylaxis   None    Procedure  In the presence of a female nurse, the patient was placed in the supine position with knees laid to the side, was prepped and draped in the usual manner using sterile technique, and 2% lidocaine jelly instilled into the urethra. Prior to this pelvic examination showed normal labia majora and minora, a normal vaginal introitus with grade II pelvic organ prolapse, most likely rectocele, and atrophic vaginal mucosa. Upon stress maneuvers there was no stress incontinence.  A 17 F flexible cystoscope was then inserted into the urethra and the urethra and bladder carefully examined.  The following findings were noted:    Findings:  Urethra:  Normal  Bladder:  Normal  Ureteral orifices:  Normal  Other findings:  None,  retroflexed view confirms    Specimens: None                 Complications:    None; patient tolerated the procedure well           Disposition: To home            Condition: Stable    Plan:  Previously seen left bladder wall erythema resolved. No suspicious lesions.     She will follow up for CT scan to reassess her enlarged pelvic lymph node as scheduled.

## 2025-03-24 ENCOUNTER — PROCEDURE VISIT (OUTPATIENT)
Dept: UROLOGY | Facility: CLINIC | Age: 80
End: 2025-03-24
Payer: COMMERCIAL

## 2025-03-24 VITALS
HEART RATE: 86 BPM | BODY MASS INDEX: 32.86 KG/M2 | WEIGHT: 163 LBS | SYSTOLIC BLOOD PRESSURE: 148 MMHG | OXYGEN SATURATION: 97 % | HEIGHT: 59 IN | DIASTOLIC BLOOD PRESSURE: 80 MMHG

## 2025-03-24 DIAGNOSIS — R31.29 MICROSCOPIC HEMATURIA: Primary | ICD-10-CM

## 2025-03-24 LAB
SL AMB  POCT GLUCOSE, UA: ABNORMAL
SL AMB LEUKOCYTE ESTERASE,UA: ABNORMAL
SL AMB POCT BILIRUBIN,UA: ABNORMAL
SL AMB POCT BLOOD,UA: ABNORMAL
SL AMB POCT CLARITY,UA: CLEAR
SL AMB POCT COLOR,UA: YELLOW
SL AMB POCT KETONES,UA: ABNORMAL
SL AMB POCT NITRITE,UA: ABNORMAL
SL AMB POCT PH,UA: 7
SL AMB POCT SPECIFIC GRAVITY,UA: 1
SL AMB POCT URINE PROTEIN: ABNORMAL
SL AMB POCT UROBILINOGEN: 0.2

## 2025-03-24 PROCEDURE — 81002 URINALYSIS NONAUTO W/O SCOPE: CPT

## 2025-03-24 PROCEDURE — 52000 CYSTOURETHROSCOPY: CPT

## 2025-04-07 ENCOUNTER — TELEPHONE (OUTPATIENT)
Age: 80
End: 2025-04-07

## 2025-04-07 ENCOUNTER — TELEPHONE (OUTPATIENT)
Dept: FAMILY MEDICINE CLINIC | Facility: CLINIC | Age: 80
End: 2025-04-07

## 2025-04-07 DIAGNOSIS — N30.00 ACUTE CYSTITIS WITHOUT HEMATURIA: Primary | ICD-10-CM

## 2025-04-07 RX ORDER — CIPROFLOXACIN 500 MG/1
500 TABLET, FILM COATED ORAL EVERY 12 HOURS SCHEDULED
Qty: 14 TABLET | Refills: 0 | Status: SHIPPED | OUTPATIENT
Start: 2025-04-07 | End: 2025-04-14

## 2025-04-07 NOTE — TELEPHONE ENCOUNTER
Patient developed a UTI these past couple of days, she has burning sensation after she urinates.  She remembers we called her 03/18 with abnormal UTI but she did not have any Sx then

## 2025-04-07 NOTE — TELEPHONE ENCOUNTER
Please call patient back and let her know that I called in an antibiotic for.  She is to take 1 tablet twice a day for 7 days with food.  Drink a lot of water.  Also take a probiotic. ty

## 2025-04-07 NOTE — TELEPHONE ENCOUNTER
Recommend she continue with the imaging that is ordered with the contrast dye as this is the best way to evaluate enlarging nodes.

## 2025-04-07 NOTE — TELEPHONE ENCOUNTER
Patient called requesting her CT scan be with no contrast as she had imaging done on 01/15 with contrast. Please call patient back at [phone]

## 2025-04-07 NOTE — TELEPHONE ENCOUNTER
Called and spoke with patient. Informed on AP's note. She became upset that she needs it with contrast but voiced understanding at the end.

## 2025-04-23 ENCOUNTER — HOSPITAL ENCOUNTER (OUTPATIENT)
Dept: RADIOLOGY | Age: 80
Discharge: HOME | End: 2025-04-23
Payer: COMMERCIAL

## 2025-04-23 DIAGNOSIS — R59.9 ENLARGED LYMPH NODE: ICD-10-CM

## 2025-04-23 PROCEDURE — 74177 CT ABD & PELVIS W/CONTRAST: CPT

## 2025-04-23 RX ADMIN — IOHEXOL 100 ML: 350 INJECTION, SOLUTION INTRAVENOUS at 11:07

## 2025-05-01 ENCOUNTER — RESULTS FOLLOW-UP (OUTPATIENT)
Dept: UROLOGY | Facility: AMBULATORY SURGERY CENTER | Age: 80
End: 2025-05-01

## 2025-05-01 NOTE — TELEPHONE ENCOUNTER
Called and went over result note from JUAN. Patient verbalized understanding and was very appreciative of call.     ----- Message from JUAN Lujan sent at 5/1/2025  9:58 AM EDT -----  Please call Jigna and let her know that her CT scan overall looks very good. The lymph node we have been watching remains stable and does not require any additional work up or follow up.

## 2025-05-21 DIAGNOSIS — R35.0 URINARY FREQUENCY: ICD-10-CM

## 2025-05-22 RX ORDER — TROSPIUM CHLORIDE 20 MG/1
20 TABLET, FILM COATED ORAL 2 TIMES DAILY
Qty: 60 TABLET | Refills: 5 | Status: SHIPPED | OUTPATIENT
Start: 2025-05-22

## 2025-06-16 ENCOUNTER — NURSE TRIAGE (OUTPATIENT)
Age: 80
End: 2025-06-16

## 2025-06-16 DIAGNOSIS — N30.00 ACUTE CYSTITIS WITHOUT HEMATURIA: Primary | ICD-10-CM

## 2025-06-16 RX ORDER — CIPROFLOXACIN 500 MG/1
500 TABLET, FILM COATED ORAL EVERY 12 HOURS SCHEDULED
Qty: 14 TABLET | Refills: 0 | Status: SHIPPED | OUTPATIENT
Start: 2025-06-16 | End: 2025-06-23

## 2025-06-16 NOTE — TELEPHONE ENCOUNTER
This message is for Barb and Dania is CCed just as an FYI.  Thank you  Please let the patient know that I will order her the antibiotic.  I also ordered her a urinalysis and if she can get the urinalysis before she starts the antibiotic it would be a good idea.  And just ANTHONY, I saw the patient on March 4 this year.  And she saw her urologist on 5/21/2025 who have been handling her urinary issues.  Thank you

## 2025-06-16 NOTE — TELEPHONE ENCOUNTER
"Regarding: UTI  ----- Message from Camille SANTOS sent at 6/16/2025 10:51 AM EDT -----  \"I need the doctor to call in a prescriotion for me, it burns when I go to the bathroom. Would like that sent to [facility] - [place], [place] - [address]\"    "

## 2025-06-16 NOTE — TELEPHONE ENCOUNTER
"REASON FOR CONVERSATION: Urinary Symptoms    SYMPTOMS: Urinary burning only. Asking for antibiotic. Declined OV.  Denies pain, frequency,urgency    OTHER HEALTH INFORMATION: states no transportation.     PROTOCOL DISPOSITION: See Within 2 Weeks in Office    CARE ADVICE PROVIDED: Declined OV even after explanation.  CALL BACK IF: * Fever occurs * Pain or burning with urination * You become worse     PRACTICE FOLLOW-UP: Please advise patient    Reason for Disposition   All other urine symptoms    Answer Assessment - Initial Assessment Questions  1. SYMPTOM: \"What's the main symptom you're concerned about?\" (e.g., frequency, incontinence)      Burning with urination   2. ONSET: \"When did the  burning  start?\"      unknown  3. PAIN: \"Is there any pain?\" If Yes, ask: \"How bad is it?\" (Scale: 1-10; mild, moderate, severe)      No pain  4. CAUSE: \"What do you think is causing the symptoms?\"      UTI  5. OTHER SYMPTOMS: \"Do you have any other symptoms?\" (e.g., blood in urine, fever, flank pain, pain with urination)      no  6. PREGNANCY: \"Is there any chance you are pregnant?\" \"When was your last menstrual period?\"      no    Protocols used: Urinary Symptoms-Adult-[place]    "

## 2025-06-17 ENCOUNTER — APPOINTMENT (OUTPATIENT)
Dept: LAB | Facility: CLINIC | Age: 80
End: 2025-06-17
Payer: COMMERCIAL

## 2025-06-17 DIAGNOSIS — R35.0 URINARY FREQUENCY: Primary | ICD-10-CM

## 2025-06-17 DIAGNOSIS — N30.00 ACUTE CYSTITIS WITHOUT HEMATURIA: Primary | ICD-10-CM

## 2025-06-17 LAB
BACTERIA UR QL AUTO: ABNORMAL /HPF
BILIRUB UR QL STRIP: NEGATIVE
CLARITY UR: ABNORMAL
COLOR UR: YELLOW
GLUCOSE UR STRIP-MCNC: NEGATIVE MG/DL
HGB UR QL STRIP.AUTO: ABNORMAL
KETONES UR STRIP-MCNC: NEGATIVE MG/DL
LEUKOCYTE ESTERASE UR QL STRIP: ABNORMAL
NITRITE UR QL STRIP: NEGATIVE
NON-SQ EPI CELLS URNS QL MICRO: ABNORMAL /HPF
PH UR STRIP.AUTO: 7.5 [PH]
PROT UR STRIP-MCNC: ABNORMAL MG/DL
RBC #/AREA URNS AUTO: ABNORMAL /HPF
SP GR UR STRIP.AUTO: 1.02 (ref 1–1.03)
UROBILINOGEN UR STRIP-ACNC: <2 MG/DL
WBC #/AREA URNS AUTO: ABNORMAL /HPF

## 2025-06-17 PROCEDURE — 81001 URINALYSIS AUTO W/SCOPE: CPT

## 2025-06-17 PROCEDURE — 87077 CULTURE AEROBIC IDENTIFY: CPT

## 2025-06-17 PROCEDURE — 87186 SC STD MICRODIL/AGAR DIL: CPT

## 2025-06-17 PROCEDURE — 87086 URINE CULTURE/COLONY COUNT: CPT

## 2025-06-17 NOTE — TELEPHONE ENCOUNTER
Jada called again from [facility] stating the lab placed still has an expected date of December and they're unable to open it. Please resend without th expected date.

## 2025-06-19 ENCOUNTER — APPOINTMENT (EMERGENCY)
Dept: RADIOLOGY | Facility: HOSPITAL | Age: 80
End: 2025-06-19
Payer: COMMERCIAL

## 2025-06-19 ENCOUNTER — HOSPITAL ENCOUNTER (EMERGENCY)
Facility: HOSPITAL | Age: 80
Discharge: HOME | End: 2025-06-19
Attending: EMERGENCY MEDICINE
Payer: COMMERCIAL

## 2025-06-19 VITALS
OXYGEN SATURATION: 93 % | TEMPERATURE: 98.4 F | WEIGHT: 169.97 LBS | RESPIRATION RATE: 18 BRPM | SYSTOLIC BLOOD PRESSURE: 112 MMHG | DIASTOLIC BLOOD PRESSURE: 55 MMHG | BODY MASS INDEX: 34.33 KG/M2 | HEART RATE: 80 BPM
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
## 2025-06-19 DIAGNOSIS — M25.552 LEFT HIP PAIN: ICD-10-CM

## 2025-06-19 LAB
BACTERIA UR CULT: ABNORMAL
BACTERIA UR CULT: ABNORMAL

## 2025-06-19 PROCEDURE — 72125 CT NECK SPINE W/O DYE: CPT

## 2025-06-19 PROCEDURE — 99284 EMERGENCY DEPT VISIT MOD MDM: CPT | Performed by: EMERGENCY MEDICINE

## 2025-06-19 PROCEDURE — 99284 EMERGENCY DEPT VISIT MOD MDM: CPT

## 2025-06-19 PROCEDURE — 70450 CT HEAD/BRAIN W/O DYE: CPT

## 2025-06-19 PROCEDURE — 73502 X-RAY EXAM HIP UNI 2-3 VIEWS: CPT

## 2025-06-19 NOTE — DISCHARGE INSTRUCTIONS
You were seen and evaluated in the emergency department after a fall.  CT imaging of your head and neck revealed no acute abnormalities.  The x-ray of your left hip/pelvis revealed no acute fractures or dislocations.  At this time you are stable to be discharged home with follow-up with your primary care physician.  You can take Tylenol/Motrin at home for discomfort.  Please return to the emergency department if experience any new or worsening symptoms.

## 2025-06-19 NOTE — ED PROVIDER NOTES
Emergency Department Trauma Note  Jigna More 79 y.o. female MRN: [id number]  Unit/Bed#: ED 02/ED 02 Encounter: [id number]      Trauma Alert: Trauma Acuity: C  Model of Arrival:   via    Trauma Team: Current Providers  Attending Provider: Clayton Salazar MD  Registered Nurse: Naresh Nuñez RN  Resident: Maday Pena DO  Consultants:     None      History of Present Illness     Chief Complaint:   Chief Complaint   Patient presents with    Fall     Pt slipped this morning, fell onto back and then struck head, takes asa daily, denies LOC, c/o back pain at this time     HPI:  Jigna More is a 79 y.o. female who presents after a mechanical fall landing.  The patient was getting blood work at the lab and walked out of the lab and slipped in the wet parking lot.  She states she fell on her left hip and the right side of her head.  She denies loss of consciousness.  She does take aspirin daily.  She denies lightheadedness, dizziness, chest pain, shortness of breath, nausea or vomiting prior to the fall.  Patient presented to the emergency department on her own and refused wheelchair and ambulated to room without assistance.  Mechanism:Details of Incident: fell backwards onto back +HS +ASA -LOC            Fall  Associated symptoms: back pain (Left lower)    Associated symptoms: no abdominal pain, no chest pain, no headaches, no nausea, no neck pain and no vomiting      Review of Systems   Constitutional:  Negative for chills and fever.   HENT:  Negative for congestion, rhinorrhea and sore throat.    Respiratory:  Negative for shortness of breath.    Cardiovascular:  Negative for chest pain.   Gastrointestinal:  Negative for abdominal pain, diarrhea, nausea and vomiting.   Genitourinary:  Negative for dysuria, hematuria and urgency.   Musculoskeletal:  Positive for back pain (Left lower). Negative for gait problem and neck pain.   Neurological:  Negative for dizziness, weakness,  light-headedness, numbness and headaches.       Historical Information     Immunizations:   Immunization History   Administered Date(s) Administered    COVID-19 MODERNA VACC 0.5 ML IM 04/15/2021, 05/13/2021, 12/20/2021    INFLUENZA 09/23/2019, 11/10/2023    Influenza Split High Dose Preservative Free IM 11/25/2024    Influenza, high dose seasonal 0.7 mL 10/21/2020, 11/04/2021, 11/18/2022    Influenza, seasonal, injectable, preservative free 09/23/2019    Pneumococcal Polysaccharide PPV23 10/23/2019    influenza, trivalent, adjuvanted 09/04/2019       Past Medical History[1]  Family History[2]  Past Surgical History[3]  Social History[4]  E-Cigarette/Vaping    E-Cigarette Use Never User      E-Cigarette/Vaping Substances       Family History: non-contributory    Meds/Allergies   Prior to Admission Medications   Prescriptions Last Dose Informant Patient Reported? Taking?   Blood Pressure Monitoring (BLOOD PRESSURE CUFF) MISC  Self No No   Sig: by Does not apply route daily   Patient not taking: Reported on 11/25/2024   Melatonin 5 MG TABS  Self No No   Sig: Take 1 tablet (5 mg total) by mouth daily at bedtime as needed (sleep issue)   Oyster Shell 500 MG TABS  Self No No   Sig: Take 2 tablets (1,000 mg total) by mouth daily   albuterol (PROVENTIL HFA,VENTOLIN HFA) 90 mcg/act inhaler  Self No No   Sig: Inhale 1 puff every 6 (six) hours as needed for wheezing   aspirin (ECOTRIN LOW STRENGTH) 81 mg EC tablet  Self No No   Sig: Take 1 tablet (81 mg total) by mouth daily   budesonide-formoterol (Symbicort) 160-4.5 mcg/act inhaler  Self No No   Sig: Rinse mouth after use.   ciprofloxacin (CIPRO) 500 mg tablet   No No   Sig: Take 1 tablet (500 mg total) by mouth every 12 (twelve) hours for 7 days   hydroCHLOROthiazide 12.5 mg tablet  Self No No   Sig: Take 1 tablet (12.5 mg total) by mouth daily   loratadine (Allergy Relief) 10 mg tablet  Self No No   Sig: Take 1 tablet (10 mg total) by mouth daily   Patient not taking:  Reported on 3/4/2025   meclizine (ANTIVERT) 12.5 MG tablet  Self No No   Sig: Take 1 tablet (12.5 mg total) by mouth every 12 (twelve) hours as needed for dizziness   simethicone (MYLICON,GAS-X) 180 MG capsule  Self No No   Sig: Take 1 capsule (180 mg total) by mouth every 6 (six) hours as needed for flatulence   simvastatin (ZOCOR) 10 mg tablet  Self No No   Sig: Take 1 tablet (10 mg total) by mouth daily at bedtime   trospium chloride (SANCTURA) 20 mg tablet   No No   Sig: Take 1 tablet (20 mg total) by mouth 2 (two) times a day      Facility-Administered Medications: None       Allergies[5]    PHYSICAL EXAM    PE limited by: N/A    Objective   Vitals:   First set: Temperature: 98.4 °F (36.9 °C) (06/19/25 0845)  Pulse: 68 (06/19/25 0845)  Respirations: 20 (06/19/25 0845)  Blood Pressure: 147/74 (06/19/25 0845)  SpO2: 96 % (06/19/25 0845)    Primary Survey:   (A) Airway: Patent  (B) Breathing: Bilateral breath sounds  (C) Circulation: Pulses:   normal, pedal  4/4, and radial  4/4  (D) Disabliity:  GCS Total:  15  (E) Expose:  Completed    Secondary Survey: (Click on Physical Exam tab above)  Physical Exam  Vitals and nursing note reviewed.   Constitutional:       General: She is not in acute distress.     Appearance: Normal appearance. She is well-developed. She is not ill-appearing.   HENT:      Head: Normocephalic and atraumatic.      Nose: Nose normal.      Mouth/Throat:      Mouth: Mucous membranes are moist.     Eyes:      Conjunctiva/sclera: Conjunctivae normal.       Cardiovascular:      Rate and Rhythm: Normal rate and regular rhythm.      Heart sounds: Normal heart sounds. No murmur heard.  Pulmonary:      Effort: Pulmonary effort is normal. No respiratory distress.      Breath sounds: Normal breath sounds.   Abdominal:      General: Abdomen is flat. There is no distension.      Palpations: Abdomen is soft.      Tenderness: There is no abdominal tenderness.     Musculoskeletal:      Right lower leg: No  edema.      Left lower leg: No edema.      Comments: No midline cervical, thoracic, lumbar tenderness.  No step-offs or visible deformities.  Moving all 4 extremities.  5 out of 5 strength in bilateral upper and lower extremities.  Sensation grossly intact.    Small area of ecchymosis on the superior lateral right skull.  No appreciable hematoma.  No overlying lacerations.     Skin:     General: Skin is warm and dry.      Capillary Refill: Capillary refill takes less than 2 seconds.     Neurological:      General: No focal deficit present.      Mental Status: She is alert and oriented to person, place, and time.     Psychiatric:         Mood and Affect: Mood normal.         Behavior: Behavior normal.         Cervical spine cleared by clinical criteria? Yes     Invasive Devices       None                   Lab Results:   Results Reviewed       None                   Imaging Studies:   Direct to CT: No  TRAUMA - CT head wo contrast   Final Result by Nina Schaeffer MD (06/19 0918)      No acute intracranial abnormality.                  Workstation performed: [id number]         TRAUMA - CT spine cervical wo contrast   Final Result by Nina Schaeffer MD (06/19 0923)      No cervical spine fracture or traumatic malalignment.                  Workstation performed: [id number]         XR hip/pelv 2-3 vws left    (Results Pending)         Procedures  Procedures         ED Course  ED Course as of 06/19/25 1046   Thu Jun 19, 2025   0921 TRAUMA - CT head wo contrast  No acute intracranial abnormality.   0928 TRAUMA - CT spine cervical wo contrast  No cervical spine fracture or traumatic malalignment.           Medical Decision Making  Patient was a level C trauma alert after mechanical fall.  Patient found to have small area of ecchymosis on the superior lateral right head.  No other findings on examination.  Airway patent, bilateral breath sounds, 2+ radial and DP pulses.  No cervical, thoracic, or lumbar tenderness.  CT  head and cervical spine revealed no acute intracranial abnormality, cervical spine fracture or malalignment.  Patient did not wish to have treatment with additional medications while in the emergency department.  X-ray of left hip/pelvis per my interpretation revealed no acute osseous abnormality.  Patient is stable to be discharged home. Prior to discharge, I discussed discharge instructions with patient at bedside. Patient was provided with both verbal and written instructions. Patient is understanding of the discharge instructions and is agreeable to plan of care. Return precautions were discussed with patient bedside. All questions were answered. Patient was comfortable with the plan of care and discharged to home.       Amount and/or Complexity of Data Reviewed  Radiology: ordered. Decision-making details documented in ED Course.                Disposition  [facility] Transfer: No  Final diagnoses:   Fall, initial encounter   Left hip pain     Time reflects when diagnosis was documented in both MDM as applicable and the Disposition within this note       Time User Action Codes Description Comment    6/19/2025 10:36 AM Maday Pena [W19.XXXA] Fall, initial encounter     6/19/2025 10:36 AM Maday Pena [M25.552] Left hip pain           ED Disposition       ED Disposition   Discharge    Condition   Stable    Date/Time   Thu Jun 19, 2025 10:36 AM    Comment   Jigna More discharge to home/self care.                   Follow-up Information       Follow up With Specialties Details Why Contact Info    Samina Almaguer MD Family Medicine   [address] [place] [id number] [phone]            Discharge Medication List as of 6/19/2025 10:37 AM        CONTINUE these medications which have NOT CHANGED    Details   albuterol (PROVENTIL HFA,VENTOLIN HFA) 90 mcg/act inhaler Inhale 1 puff every 6 (six) hours as needed for wheezing, Starting Mon 11/25/2024,  Normal      aspirin (ECOTRIN LOW STRENGTH) 81 mg EC tablet Take 1 tablet (81 mg total) by mouth daily, Starting Mon 11/25/2024, Normal      Blood Pressure Monitoring (BLOOD PRESSURE CUFF) MISC by Does not apply route daily, Starting Fri 3/13/2020, Print      budesonide-formoterol (Symbicort) 160-4.5 mcg/act inhaler Rinse mouth after use., Normal      ciprofloxacin (CIPRO) 500 mg tablet Take 1 tablet (500 mg total) by mouth every 12 (twelve) hours for 7 days, Starting Mon 6/16/2025, Until Mon 6/23/2025, Normal      hydroCHLOROthiazide 12.5 mg tablet Take 1 tablet (12.5 mg total) by mouth daily, Starting Mon 11/25/2024, Normal      loratadine (Allergy Relief) 10 mg tablet Take 1 tablet (10 mg total) by mouth daily, Starting Mon 11/25/2024, Normal      meclizine (ANTIVERT) 12.5 MG tablet Take 1 tablet (12.5 mg total) by mouth every 12 (twelve) hours as needed for dizziness, Starting Mon 11/25/2024, Normal      Melatonin 5 MG TABS Take 1 tablet (5 mg total) by mouth daily at bedtime as needed (sleep issue), Starting Mon 11/25/2024, Normal      Oyster Shell 500 MG TABS Take 2 tablets (1,000 mg total) by mouth daily, Starting Wed 7/8/2020, Until Tue 10/6/2020, Normal      simethicone (MYLICON,GAS-X) 180 MG capsule Take 1 capsule (180 mg total) by mouth every 6 (six) hours as needed for flatulence, Starting Fri 1/12/2024, Normal      simvastatin (ZOCOR) 10 mg tablet Take 1 tablet (10 mg total) by mouth daily at bedtime, Starting Mon 11/25/2024, Normal      trospium chloride (SANCTURA) 20 mg tablet Take 1 tablet (20 mg total) by mouth 2 (two) times a day, Starting Thu 5/22/2025, Normal           No discharge procedures on file.    PDMP Review         Value Time User    PDMP Reviewed  Yes 5/21/2020 11:12 AM Nancy Calvillo MD            ED Provider  Electronically Signed by             [1]   Past Medical History:  Diagnosis Date    COPD (chronic obstructive pulmonary disease) (HCC)     Fracture 2011    rt leg     H/O: hysterectomy 2011    Shingles     Stroke (HCC) 2011   [2]   Family History  Problem Relation Name Age of Onset    Hyperlipidemia Mother      Hypertension Father      Cancer Sister      Diabetes Family     [3]   Past Surgical History:  Procedure Laterality Date    HYSTERECTOMY     [4]   Social History  Tobacco Use    Smoking status: Former     Current packs/day: 0.00     Average packs/day: 0.5 packs/day for 40.0 years (20.0 ttl pk-yrs)     Types: Cigarettes     Start date:      Quit date:      Years since quittin.4    Smokeless tobacco: Never   Vaping Use    Vaping status: Never Used   Substance Use Topics    Alcohol use: No    Drug use: No   [5]   Allergies  Allergen Reactions    Lisinopril Cough        Maday Pena DO  [phone]

## 2025-06-19 NOTE — ED ATTENDING ATTESTATION
6/19/2025  I, Clayton Salazar MD, saw and evaluated the patient. I have discussed the patient with the resident/non-physician practitioner and agree with the resident's/non-physician practitioner's findings, Plan of Care, and MDM as documented in the resident's/non-physician practitioner's note, except where noted. All available labs and Radiology studies were reviewed.  I was present for key portions of any procedure(s) performed by the resident/non-physician practitioner and I was immediately available to provide assistance.       At this point I agree with the current assessment done in the Emergency Department.  I have conducted an independent evaluation of this patient a history and physical is as follows:  Patient presents for evaluation of a fall it was mechanical she tripped the patient bumped the right side of her head she also hit her left buttock area she has complaints of pain in the left buttock area  No LOC  No neck pain no back pain  Patient walked into the ER to her own power  On aspirin  Therefore level C trauma activated    Exam GCS 15 HEENT small contusion right side of head occipital pupils equal reactive neck nontender full range of motion lungs clear chest wall nontender no bruising  Heart is regular abdomen soft and nontender pelvis stable back exam no tenderness noted there is some mild tenderness over left buttock area no bruising noted no crepitation noted full range of motion of all major joints neurologic exam is nonfocal  Fall with minor head injury contusion to left buttock area  ED Course         Critical Care Time  Procedures

## 2025-06-24 ENCOUNTER — APPOINTMENT (EMERGENCY)
Dept: RADIOLOGY | Facility: HOSPITAL | Age: 80
End: 2025-06-24
Payer: COMMERCIAL

## 2025-06-24 ENCOUNTER — HOSPITAL ENCOUNTER (EMERGENCY)
Facility: HOSPITAL | Age: 80
Discharge: HOME | End: 2025-06-24
Attending: EMERGENCY MEDICINE | Admitting: EMERGENCY MEDICINE
Payer: COMMERCIAL

## 2025-06-24 VITALS
DIASTOLIC BLOOD PRESSURE: 72 MMHG | OXYGEN SATURATION: 95 % | RESPIRATION RATE: 18 BRPM | HEART RATE: 83 BPM | SYSTOLIC BLOOD PRESSURE: 141 MMHG | TEMPERATURE: 97.1 F

## 2025-06-24 DIAGNOSIS — M54.50 LOW BACK PAIN: Primary | ICD-10-CM

## 2025-06-24 PROCEDURE — 99284 EMERGENCY DEPT VISIT MOD MDM: CPT | Performed by: EMERGENCY MEDICINE

## 2025-06-24 PROCEDURE — 72131 CT LUMBAR SPINE W/O DYE: CPT

## 2025-06-24 PROCEDURE — 99284 EMERGENCY DEPT VISIT MOD MDM: CPT

## 2025-06-24 PROCEDURE — 73700 CT LOWER EXTREMITY W/O DYE: CPT

## 2025-06-24 RX ORDER — LIDOCAINE 50 MG/G
1 PATCH TOPICAL EVERY 12 HOURS PRN
Qty: 14 PATCH | Refills: 0 | Status: SHIPPED | OUTPATIENT
Start: 2025-06-24 | End: 2025-07-08

## 2025-06-24 RX ORDER — LIDOCAINE 50 MG/G
1 PATCH TOPICAL EVERY 24 HOURS
Qty: 14 PATCH | Refills: 0 | Status: SHIPPED | OUTPATIENT
Start: 2025-06-24 | End: 2025-06-24 | Stop reason: CLARIF

## 2025-06-24 RX ORDER — OXYCODONE HYDROCHLORIDE 5 MG/1
5 TABLET ORAL EVERY 4 HOURS PRN
Qty: 10 TABLET | Refills: 0 | Status: SHIPPED | OUTPATIENT
Start: 2025-06-24 | End: 2025-07-04

## 2025-06-24 RX ORDER — OXYCODONE HYDROCHLORIDE 5 MG/1
5 TABLET ORAL ONCE
Refills: 0 | Status: DISCONTINUED | OUTPATIENT
Start: 2025-06-24 | End: 2025-06-24 | Stop reason: HOSPADM

## 2025-06-24 RX ORDER — LIDOCAINE 50 MG/G
1 PATCH TOPICAL ONCE
Status: DISCONTINUED | OUTPATIENT
Start: 2025-06-24 | End: 2025-06-24 | Stop reason: HOSPADM

## 2025-06-24 RX ADMIN — LIDOCAINE 1 PATCH: 50 PATCH CUTANEOUS at 11:54

## 2025-06-24 NOTE — DISCHARGE INSTRUCTIONS
You were seen in the ER for low back pain.  The CAT scans of your back have not been read yet.  You will receive a phone call if there is any findings on them.  A prescription for lidocaine patches and oxycodone have's been sent to your pharmacy.  Please take Tylenol 650 mg every 6 hours as needed for pain.  If you continue to have pain you can take an oxycodone every 6 hours if needed.  This can make you drowsy do not drive after taking it.  Return to the emergency department if you cannot walk, significant pain, or any new or concerning symptoms.

## 2025-06-24 NOTE — ED PROVIDER NOTES
"Time reflects when diagnosis was documented in both MDM as applicable and the Disposition within this note       Time User Action Codes Description Comment    6/24/2025  4:05 PM Genevieve Montejo [M54.50] Low back pain           ED Disposition       ED Disposition   Discharge    Condition   Stable    Date/Time   Tue Jun 24, 2025  4:05 PM    Comment   Jigna MECHELLE More discharge to home/self care.                   Assessment & Plan       Medical Decision Making  Patient is a 79 y.o. female who presents to the ED with low back pain.    Vital signs within normal limits. On exam lumbar paraspinal tenderness, 5/5 strength, gait intact.    History and physical exam most consistent with soft tissue pain. However, differential diagnosis included but not limited to fracture.     Plan: CT right hip and lumbar for possible occult fracture    View ED course above for further discussion on patient workup.     On review of previous records reviewed the visit from Thursday.  Formal x-ray result negative for fracture and no fracture my interpretation..    All labs reviewed and utilized in the medical decision making process  All radiology studies independently viewed by me and interpreted by the radiologist.  I reviewed all testing with the patient.     CT right hip negative for fracture.  Low suspicion for occult fracture at this time.  Patient has been ambulating throughout her visit.  Discussed CT lumbar results with patient.  Discussed return precautions and supportive care. Encouraged PCP followup. Patient/guardian agrees and understands plan. All questions answered.     Disposition: Stable for discharge    Portions of the record may have been created with voice recognition software. Occasional wrong word or \"sound a like\" substitutions may have occurred due to the inherent limitations of voice recognition software. Read the chart carefully and recognize, using context, where substitutions have occurred.      Amount " and/or Complexity of Data Reviewed  Radiology: ordered.    Risk  Prescription drug management.        ED Course as of 06/24/25 1626   Tue Jun 24, 2025   1622 Discussed ct results with patient       Medications   lidocaine (LIDODERM) 5 % patch 1 patch (1 patch Topical Medication Applied 6/24/25 1154)   oxyCODONE (ROXICODONE) IR tablet 5 mg (5 mg Oral Not Given 6/24/25 1550)       ED Risk Strat Scores                    (ISAR) Identification of Seniors at Risk  Before the illness or injury that brought you to the Emergency, did you need someone to help you on a regular basis?: 0  In the last 24 hours, have you needed more help than usual?: 0  Have you been hospitalized for one or more nights during the past 6 months?: 0  In general, do you see well?: 0  In general, do you have serious problems with your memory?: 0  Do you take more than three different medications every day?: 1  ISAR Score: 1            SBIRT 22yo+      Flowsheet Row Most Recent Value   Initial Alcohol Screen: US AUDIT-C     1. How often do you have a drink containing alcohol? 0 Filed at: 06/24/2025 1103   2. How many drinks containing alcohol do you have on a typical day you are drinking?  0 Filed at: 06/24/2025 1103   3a. Male UNDER 65: How often do you have five or more drinks on one occasion? 0 Filed at: 06/24/2025 1103   3b. FEMALE Any Age, or MALE 65+: How often do you have 4 or more drinks on one occassion? 0 Filed at: 06/24/2025 1103   Audit-C Score 0 Filed at: 06/24/2025 1103                            History of Present Illness       Chief Complaint   Patient presents with    Back Pain     Pt seen here on 19th after fall onto L hip, had xrays and CT done, sent home with tylenol, reports increased lower back pain over last few days        Past Medical History[1]   Past Surgical History[2]   Family History[3]   Social History[4]   E-Cigarette/Vaping    E-Cigarette Use Never User       E-Cigarette/Vaping Substances      I have reviewed and  agree with the history as documented.     79-year-old female presents for evaluation of low back pain.  She fell last Thursday and came to the emergency department and had negative CT head, cervical spine, pelvic x-ray.  However she still complaining of low back pain that is worse when she is sleeping and walking.  However she is able to ambulate.  She is taking Tylenol every 6 hours without any relief of pain.  Denies fever, numbness, weakness.      Back Pain  Associated symptoms: no abdominal pain, no chest pain, no dysuria, no fever, no numbness and no weakness        Review of Systems   Constitutional:  Negative for chills and fever.   HENT:  Negative for ear pain and sore throat.    Eyes:  Negative for pain and visual disturbance.   Respiratory:  Negative for cough and shortness of breath.    Cardiovascular:  Negative for chest pain and palpitations.   Gastrointestinal:  Negative for abdominal pain and vomiting.   Genitourinary:  Negative for dysuria and hematuria.   Musculoskeletal:  Positive for back pain. Negative for arthralgias and gait problem.   Skin:  Negative for color change and rash.   Neurological:  Negative for seizures, syncope, weakness and numbness.   All other systems reviewed and are negative.          Objective       ED Triage Vitals [06/24/25 1101]   Temperature Pulse Blood Pressure Respirations SpO2 Patient Position - Orthostatic VS   (!) 97.1 °F (36.2 °C) 83 141/72 18 95 % Sitting      Temp Source Heart Rate Source BP Location FiO2 (%) Pain Score    Tympanic Monitor Left arm -- 10 - Worst Possible Pain      Vitals      Date and Time Temp Pulse SpO2 Resp BP Pain Score FACES Pain Rating User   06/24/25 1524 -- -- -- -- -- No Pain -- GR   06/24/25 1101 97.1 °F (36.2 °C) 83 95 % 18 141/72 10 - Worst Possible Pain -- MV            Physical Exam  Vitals and nursing note reviewed.   Constitutional:       General: She is not in acute distress.     Appearance: She is well-developed.   HENT:       Head: Normocephalic and atraumatic.     Eyes:      Conjunctiva/sclera: Conjunctivae normal.       Cardiovascular:      Rate and Rhythm: Normal rate and regular rhythm.      Heart sounds: No murmur heard.  Pulmonary:      Effort: Pulmonary effort is normal. No respiratory distress.      Breath sounds: Normal breath sounds.   Abdominal:      Palpations: Abdomen is soft.      Tenderness: There is no abdominal tenderness.     Musculoskeletal:         General: No swelling.      Cervical back: Neck supple.        Back:       Comments: 5 out of 5 strength, sensation intact     Skin:     General: Skin is warm and dry.      Capillary Refill: Capillary refill takes less than 2 seconds.     Neurological:      Mental Status: She is alert.      Gait: Gait is intact.     Psychiatric:         Mood and Affect: Mood normal.         Results Reviewed       None            CT lumbar spine without contrast   Final Interpretation by Jerel Rao MD (06/24 1619)      Acute nondisplaced fracture of right L1-L2 lateral paravertebral osteophyte.      Acute/subacute fracture of L2 superior endplate with mild height loss, worse since 4/23/2025.      Degenerative changes, as detailed above.      Additional chronic/incidental findings as detailed above.      Please see same day CT right lower extremity.      The study was marked in EPIC for immediate notification.      Workstation performed: [id number]         CT lower extremity wo contrast right   Final Interpretation by Mark Ray MD (06/24 1611)      No acute osseous abnormality. Mild right hip osteoarthrosis. If clinical suspicion for a radio occult fracture remains high, MR imaging would be more sensitive.         Workstation performed: [id number]             Procedures    ED Medication and Procedure Management   Prior to Admission Medications   Prescriptions Last Dose Informant Patient Reported? Taking?   Blood Pressure Monitoring (BLOOD PRESSURE CUFF) MISC  Self No No    Sig: by Does not apply route daily   Patient not taking: Reported on 11/25/2024   Melatonin 5 MG TABS  Self No No   Sig: Take 1 tablet (5 mg total) by mouth daily at bedtime as needed (sleep issue)   Oyster Shell 500 MG TABS  Self No No   Sig: Take 2 tablets (1,000 mg total) by mouth daily   albuterol (PROVENTIL HFA,VENTOLIN HFA) 90 mcg/act inhaler  Self No No   Sig: Inhale 1 puff every 6 (six) hours as needed for wheezing   aspirin (ECOTRIN LOW STRENGTH) 81 mg EC tablet  Self No No   Sig: Take 1 tablet (81 mg total) by mouth daily   budesonide-formoterol (Symbicort) 160-4.5 mcg/act inhaler  Self No No   Sig: Rinse mouth after use.   ciprofloxacin (CIPRO) 500 mg tablet   No No   Sig: Take 1 tablet (500 mg total) by mouth every 12 (twelve) hours for 7 days   hydroCHLOROthiazide 12.5 mg tablet  Self No No   Sig: Take 1 tablet (12.5 mg total) by mouth daily   loratadine (Allergy Relief) 10 mg tablet  Self No No   Sig: Take 1 tablet (10 mg total) by mouth daily   Patient not taking: Reported on 3/4/2025   meclizine (ANTIVERT) 12.5 MG tablet  Self No No   Sig: Take 1 tablet (12.5 mg total) by mouth every 12 (twelve) hours as needed for dizziness   simethicone (MYLICON,GAS-X) 180 MG capsule  Self No No   Sig: Take 1 capsule (180 mg total) by mouth every 6 (six) hours as needed for flatulence   simvastatin (ZOCOR) 10 mg tablet  Self No No   Sig: Take 1 tablet (10 mg total) by mouth daily at bedtime   trospium chloride (SANCTURA) 20 mg tablet   No No   Sig: Take 1 tablet (20 mg total) by mouth 2 (two) times a day      Facility-Administered Medications: None     Discharge Medication List as of 6/24/2025  4:06 PM        CONTINUE these medications which have NOT CHANGED    Details   albuterol (PROVENTIL HFA,VENTOLIN HFA) 90 mcg/act inhaler Inhale 1 puff every 6 (six) hours as needed for wheezing, Starting Mon 11/25/2024, Normal      aspirin (ECOTRIN LOW STRENGTH) 81 mg EC tablet Take 1 tablet (81 mg total) by mouth daily,  Starting Mon 11/25/2024, Normal      Blood Pressure Monitoring (BLOOD PRESSURE CUFF) MISC by Does not apply route daily, Starting Fri 3/13/2020, Print      budesonide-formoterol (Symbicort) 160-4.5 mcg/act inhaler Rinse mouth after use., Normal      hydroCHLOROthiazide 12.5 mg tablet Take 1 tablet (12.5 mg total) by mouth daily, Starting Mon 11/25/2024, Normal      loratadine (Allergy Relief) 10 mg tablet Take 1 tablet (10 mg total) by mouth daily, Starting Mon 11/25/2024, Normal      meclizine (ANTIVERT) 12.5 MG tablet Take 1 tablet (12.5 mg total) by mouth every 12 (twelve) hours as needed for dizziness, Starting Mon 11/25/2024, Normal      Melatonin 5 MG TABS Take 1 tablet (5 mg total) by mouth daily at bedtime as needed (sleep issue), Starting Mon 11/25/2024, Normal      Oyster Shell 500 MG TABS Take 2 tablets (1,000 mg total) by mouth daily, Starting Wed 7/8/2020, Until Tue 10/6/2020, Normal      simethicone (MYLICON,GAS-X) 180 MG capsule Take 1 capsule (180 mg total) by mouth every 6 (six) hours as needed for flatulence, Starting Fri 1/12/2024, Normal      simvastatin (ZOCOR) 10 mg tablet Take 1 tablet (10 mg total) by mouth daily at bedtime, Starting Mon 11/25/2024, Normal      trospium chloride (SANCTURA) 20 mg tablet Take 1 tablet (20 mg total) by mouth 2 (two) times a day, Starting Thu 5/22/2025, Normal           STOP taking these medications       ciprofloxacin (CIPRO) 500 mg tablet Comments:   Reason for Stopping:             No discharge procedures on file.  ED SEPSIS DOCUMENTATION   Time reflects when diagnosis was documented in both MDM as applicable and the Disposition within this note       Time User Action Codes Description Comment    6/24/2025  4:05 PM Genevieve Montejo Add [M54.50] Low back pain                    Genevieve Montejo, DO  06/24/25 1616       [1]   Past Medical History:  Diagnosis Date    COPD (chronic obstructive pulmonary disease) (HCC)     Fracture 2011    rt leg    H/O: hysterectomy  2011    Shingles     Stroke (HCC) 2011   [2]   Past Surgical History:  Procedure Laterality Date    HYSTERECTOMY  2011   [3]   Family History  Problem Relation Name Age of Onset    Hyperlipidemia Mother      Hypertension Father      Cancer Sister      Diabetes Family     [4]   Social History  Tobacco Use    Smoking status: Former     Current packs/day: 0.00     Average packs/day: 0.5 packs/day for 40.0 years (20.0 ttl pk-yrs)     Types: Cigarettes     Start date:      Quit date:      Years since quittin.4    Smokeless tobacco: Never   Vaping Use    Vaping status: Never Used   Substance Use Topics    Alcohol use: No    Drug use: No        Genevieve Montejo, DO  [phone]

## 2025-06-24 NOTE — ED ATTENDING ATTESTATION
6/24/2025  I, Kiran Cox DO, saw and evaluated the patient. I have discussed the patient with the resident/non-physician practitioner and agree with the resident's/non-physician practitioner's findings, Plan of Care, and MDM as documented in the resident's/non-physician practitioner's note, except where noted. All available labs and Radiology studies were reviewed.  I was present for key portions of any procedure(s) performed by the resident/non-physician practitioner and I was immediately available to provide assistance.       At this point I agree with the current assessment done in the Emergency Department.  I have conducted an independent evaluation of this patient a history and physical is as follows:    ED Course  ED Course as of 06/24/25 1635   Tue Jun 24, 2025   1130 79F with noted lower buttock, and posterior pain in the lumbar area and the hip.   Able to bear weight. Nv intact, no other c/o noted.    Ddx- occult fx, strain,sprain, contusion.     Plan ct scan for eval for bone fx.   Pain control.          Critical Care Time  Procedures

## 2025-06-25 ENCOUNTER — TELEPHONE (OUTPATIENT)
Age: 80
End: 2025-06-25

## 2025-06-25 NOTE — TELEPHONE ENCOUNTER
Patient was in the ER yesterday for lower back pain and was prescribed   lidocaine (LIDODERM) 5 %  patch. She states she insurance told her that her physician has to call pharmacy in order to get this medication.(Language barrier).    Please advise

## 2025-06-26 ENCOUNTER — TELEPHONE (OUTPATIENT)
Dept: FAMILY MEDICINE CLINIC | Facility: CLINIC | Age: 80
End: 2025-06-26

## 2025-06-26 RX ORDER — LIDOCAINE 40 MG/G
CREAM TOPICAL AS NEEDED
Qty: 28 G | Refills: 0 | Status: SHIPPED | OUTPATIENT
Start: 2025-06-26

## 2025-06-26 NOTE — TELEPHONE ENCOUNTER
Jigna called stating that the provider needs to contact the insurance company for the prescription.    Please advise.

## 2025-06-26 NOTE — TELEPHONE ENCOUNTER
PA for lidocaine (LIDODERM) 5 % SUBMITTED to [facility]    via    [x]CMM-KEY: [id number]  []Surescripts-Case ID #   []Availity-Auth ID # NDC #   []Faxed to plan   []Other website   []Phone call Case ID #     [x]PA sent as URGENT    All office notes, labs and other pertaining documents and studies sent. Clinical questions answered. Awaiting determination from insurance company.     Turnaround time for your insurance to make a decision on your Prior Authorization can take 7-21 business days.

## 2025-06-30 NOTE — TELEPHONE ENCOUNTER
PA for lidocaine (LIDODERM) 5 % APPROVED     Date(s) approved until 12/31/25    Case # [id number]    Patient advised by          [][employer] Message  [x]Phone call   [x]LMOM  []L/M to call office as no active Communication consent on file  []Unable to leave detailed message as VM not approved on Communication consent       Pharmacy advised by    [x]Fax  []Phone call  []Secure Chat    Approval letter scanned into Media Yes

## 2025-07-22 ENCOUNTER — RESULTS FOLLOW-UP (OUTPATIENT)
Dept: FAMILY MEDICINE CLINIC | Facility: CLINIC | Age: 80
End: 2025-07-22